# Patient Record
Sex: MALE | Race: BLACK OR AFRICAN AMERICAN | Employment: FULL TIME | URBAN - METROPOLITAN AREA
[De-identification: names, ages, dates, MRNs, and addresses within clinical notes are randomized per-mention and may not be internally consistent; named-entity substitution may affect disease eponyms.]

---

## 2021-06-02 DIAGNOSIS — Z11.3 ROUTINE SCREENING FOR STI (SEXUALLY TRANSMITTED INFECTION): ICD-10-CM

## 2021-06-02 DIAGNOSIS — Z21 HIV INFECTION, UNSPECIFIED SYMPTOM STATUS (H): Primary | ICD-10-CM

## 2021-06-02 NOTE — PROGRESS NOTES
Pt is newly diagnosed with HIV. Per [facility] Ambulatory Care Protocol, Pt is due for routine labs based on disease state or monitoring of medications. Lab orders entered per clinic protocol.  Amira Patricia RN

## 2021-06-10 DIAGNOSIS — Z11.3 ROUTINE SCREENING FOR STI (SEXUALLY TRANSMITTED INFECTION): ICD-10-CM

## 2021-06-10 DIAGNOSIS — Z21 HIV INFECTION, UNSPECIFIED SYMPTOM STATUS (H): ICD-10-CM

## 2021-06-10 LAB
ALBUMIN SERPL-MCNC: 3.8 G/DL (ref 3.4–5)
ALBUMIN UR-MCNC: NEGATIVE MG/DL
ALP SERPL-CCNC: 93 U/L (ref 40–150)
ALT SERPL W P-5'-P-CCNC: 38 U/L (ref 0–70)
AMYLASE SERPL-CCNC: 42 U/L (ref 30–110)
ANION GAP SERPL CALCULATED.3IONS-SCNC: 6 MMOL/L (ref 3–14)
APPEARANCE UR: CLEAR
AST SERPL W P-5'-P-CCNC: 27 U/L (ref 0–45)
BASOPHILS # BLD AUTO: 0 10E9/L (ref 0–0.2)
BASOPHILS NFR BLD AUTO: 1 %
BILIRUB SERPL-MCNC: 0.4 MG/DL (ref 0.2–1.3)
BILIRUB UR QL STRIP: NEGATIVE
BUN SERPL-MCNC: 7 MG/DL (ref 7–30)
CALCIUM SERPL-MCNC: 9.1 MG/DL (ref 8.5–10.1)
CD3 CELLS # BLD: 2497 CELLS/UL (ref 603–2990)
CD3 CELLS NFR BLD: 81 % (ref 49–84)
CD3+CD4+ CELLS # BLD: 606 CELLS/UL (ref 441–2156)
CD3+CD4+ CELLS NFR BLD: 20 % (ref 28–63)
CD3+CD4+ CELLS/CD3+CD8+ CLL BLD: 0.34 % (ref 1.4–2.6)
CD3+CD8+ CELLS # BLD: 1830 CELLS/UL (ref 125–1312)
CD3+CD8+ CELLS NFR BLD: 59 % (ref 10–40)
CHLORIDE SERPL-SCNC: 112 MMOL/L (ref 94–109)
CO2 SERPL-SCNC: 25 MMOL/L (ref 20–32)
COLOR UR AUTO: YELLOW
CREAT SERPL-MCNC: 0.9 MG/DL (ref 0.66–1.25)
DIFFERENTIAL METHOD BLD: ABNORMAL
EOSINOPHIL # BLD AUTO: 0 10E9/L (ref 0–0.7)
EOSINOPHIL NFR BLD AUTO: 1 %
ERYTHROCYTE [DISTWIDTH] IN BLOOD BY AUTOMATED COUNT: 11.5 % (ref 10–15)
GFR SERPL CREATININE-BSD FRML MDRD: >90 ML/MIN/{1.73_M2}
GLUCOSE SERPL-MCNC: 93 MG/DL (ref 70–99)
GLUCOSE UR STRIP-MCNC: NEGATIVE MG/DL
HCT VFR BLD AUTO: 43.3 % (ref 40–53)
HGB BLD-MCNC: 14.7 G/DL (ref 13.3–17.7)
HGB UR QL STRIP: NEGATIVE
IFC SPECIMEN: ABNORMAL
KETONES UR STRIP-MCNC: NEGATIVE MG/DL
LEUKOCYTE ESTERASE UR QL STRIP: NEGATIVE
LIPASE SERPL-CCNC: 42 U/L (ref 73–393)
LYMPHOCYTES # BLD AUTO: 3.4 10E9/L (ref 0.8–5.3)
LYMPHOCYTES NFR BLD AUTO: 76 %
MCH RBC QN AUTO: 30.1 PG (ref 26.5–33)
MCHC RBC AUTO-ENTMCNC: 33.9 G/DL (ref 31.5–36.5)
MCV RBC AUTO: 89 FL (ref 78–100)
MONOCYTES # BLD AUTO: 0.3 10E9/L (ref 0–1.3)
MONOCYTES NFR BLD AUTO: 6 %
NEUTROPHILS # BLD AUTO: 0.7 10E9/L (ref 1.6–8.3)
NEUTROPHILS NFR BLD AUTO: 16 %
NITRATE UR QL: NEGATIVE
PH UR STRIP: 5 PH (ref 5–7)
PLATELET # BLD AUTO: 304 10E9/L (ref 150–450)
PLATELET # BLD EST: ABNORMAL 10*3/UL
POTASSIUM SERPL-SCNC: 4 MMOL/L (ref 3.4–5.3)
PROT SERPL-MCNC: 8 G/DL (ref 6.8–8.8)
RBC # BLD AUTO: 4.88 10E12/L (ref 4.4–5.9)
RBC #/AREA URNS AUTO: 0 /HPF (ref 0–2)
RBC MORPH BLD: NORMAL
SODIUM SERPL-SCNC: 143 MMOL/L (ref 133–144)
SOURCE: NORMAL
SP GR UR STRIP: 1.01 (ref 1–1.03)
T PALLIDUM AB SER QL: REACTIVE
UROBILINOGEN UR STRIP-MCNC: 2 MG/DL (ref 0–2)
WBC # BLD AUTO: 4.5 10E9/L (ref 4–11)
WBC #/AREA URNS AUTO: 1 /HPF (ref 0–5)

## 2021-06-10 PROCEDURE — 36415 COLL VENOUS BLD VENIPUNCTURE: CPT | Performed by: PATHOLOGY

## 2021-06-10 PROCEDURE — 86708 HEPATITIS A ANTIBODY: CPT | Performed by: INTERNAL MEDICINE

## 2021-06-10 PROCEDURE — 86701 HIV-1ANTIBODY: CPT | Mod: XU | Performed by: INTERNAL MEDICINE

## 2021-06-10 PROCEDURE — 86359 T CELLS TOTAL COUNT: CPT | Performed by: INTERNAL MEDICINE

## 2021-06-10 PROCEDURE — 86706 HEP B SURFACE ANTIBODY: CPT | Performed by: INTERNAL MEDICINE

## 2021-06-10 PROCEDURE — 81381 HLA I TYPING 1 ALLELE HR: CPT | Performed by: INTERNAL MEDICINE

## 2021-06-10 PROCEDURE — 87389 HIV-1 AG W/HIV-1&-2 AB AG IA: CPT | Performed by: INTERNAL MEDICINE

## 2021-06-10 PROCEDURE — 86780 TREPONEMA PALLIDUM: CPT | Performed by: INTERNAL MEDICINE

## 2021-06-10 PROCEDURE — 86360 T CELL ABSOLUTE COUNT/RATIO: CPT | Performed by: INTERNAL MEDICINE

## 2021-06-10 PROCEDURE — 81001 URINALYSIS AUTO W/SCOPE: CPT | Performed by: PATHOLOGY

## 2021-06-10 PROCEDURE — 86777 TOXOPLASMA ANTIBODY: CPT | Performed by: INTERNAL MEDICINE

## 2021-06-10 PROCEDURE — 83690 ASSAY OF LIPASE: CPT | Performed by: PATHOLOGY

## 2021-06-10 PROCEDURE — 86644 CMV ANTIBODY: CPT | Performed by: INTERNAL MEDICINE

## 2021-06-10 PROCEDURE — 87536 HIV-1 QUANT&REVRSE TRNSCRPJ: CPT | Mod: XU | Performed by: INTERNAL MEDICINE

## 2021-06-10 PROCEDURE — 87340 HEPATITIS B SURFACE AG IA: CPT | Performed by: INTERNAL MEDICINE

## 2021-06-10 PROCEDURE — 86592 SYPHILIS TEST NON-TREP QUAL: CPT | Performed by: INTERNAL MEDICINE

## 2021-06-10 PROCEDURE — 80053 COMPREHEN METABOLIC PANEL: CPT | Performed by: PATHOLOGY

## 2021-06-10 PROCEDURE — 86702 HIV-2 ANTIBODY: CPT | Performed by: INTERNAL MEDICINE

## 2021-06-10 PROCEDURE — 87491 CHLMYD TRACH DNA AMP PROBE: CPT | Mod: XU | Performed by: INTERNAL MEDICINE

## 2021-06-10 PROCEDURE — 87591 N.GONORRHOEAE DNA AMP PROB: CPT | Mod: XU | Performed by: INTERNAL MEDICINE

## 2021-06-10 PROCEDURE — 82150 ASSAY OF AMYLASE: CPT | Performed by: PATHOLOGY

## 2021-06-10 PROCEDURE — 86704 HEP B CORE ANTIBODY TOTAL: CPT | Performed by: INTERNAL MEDICINE

## 2021-06-10 PROCEDURE — 86593 SYPHILIS TEST NON-TREP QUANT: CPT | Performed by: INTERNAL MEDICINE

## 2021-06-10 PROCEDURE — 85025 COMPLETE CBC W/AUTO DIFF WBC: CPT | Performed by: PATHOLOGY

## 2021-06-10 PROCEDURE — 86803 HEPATITIS C AB TEST: CPT | Performed by: INTERNAL MEDICINE

## 2021-06-10 NOTE — TELEPHONE ENCOUNTER
RECORDS RECEIVED FROM:    DATE RECEIVED: 06.17.2021   NOTES (Gather within 2 years) STATUS DETAILS   OFFICE NOTE from referring provider   N/A    OFFICE NOTE from other specialist N/a    DISCHARGE SUMMARY from hospital N/A    DISCHARGE REPORT from the ER N/A    LABS (any labs) Internal    MEDICATION LIST N/A    IMAGING  (NEED IMAGES AND REPORTS)     Osteomyelitis: Foot imaging  N/A    Liver Abscess: Abdominal imaging N/A    Other (anything related to diagnoses N/A

## 2021-06-11 LAB
C TRACH DNA SPEC QL NAA+PROBE: NEGATIVE
CMV IGG SERPL QL IA: >8 AI (ref 0–0.8)
HAV IGG SER QL IA: NONREACTIVE
HBV CORE AB SERPL QL IA: NONREACTIVE
HBV SURFACE AB SERPL IA-ACNC: 3.2 M[IU]/ML
HBV SURFACE AG SERPL QL IA: NONREACTIVE
HCV AB SERPL QL IA: NONREACTIVE
HIV 1 & 2 AB SERPL IA.RAPID: NONREACTIVE
HIV 1 & 2 AB SERPL IA.RAPID: REACTIVE
HIV 1+2 AB+HIV1 P24 AG SERPL QL IA: REACTIVE
HIV 1+2 AB+HIV1P24 AG SERPLBLD IA.RAPID: ABNORMAL
HIV1 RNA # PLAS NAA DL=20: 6123 {COPIES}/ML
HIV1 RNA SERPL NAA+PROBE-LOG#: 3.8 {LOG_COPIES}/ML
MISCELLANEOUS TEST: NORMAL
N GONORRHOEA DNA SPEC QL NAA+PROBE: NEGATIVE
RPR SER QL: REACTIVE
RPR SER-TITR: 32 {TITER}
SPECIMEN SOURCE: NORMAL
SPECIMEN SOURCE: NORMAL
T GONDII IGG SER QL: <3 IU/ML (ref 0–7.1)

## 2021-06-15 LAB
HLA TYPE SA METHOD: NORMAL
HLA TYPE SA RESULT: NORMAL

## 2021-06-17 ENCOUNTER — PRE VISIT (OUTPATIENT)
Dept: INFECTIOUS DISEASES | Facility: CLINIC | Age: 21
End: 2021-06-17

## 2021-06-17 ENCOUNTER — ALLIED HEALTH/NURSE VISIT (OUTPATIENT)
Dept: INFECTIOUS DISEASES | Facility: CLINIC | Age: 21
End: 2021-06-17
Attending: INTERNAL MEDICINE
Payer: MEDICAID

## 2021-06-17 DIAGNOSIS — A53.9 SYPHILIS: Primary | ICD-10-CM

## 2021-06-17 PROCEDURE — 250N000011 HC RX IP 250 OP 636: Performed by: INTERNAL MEDICINE

## 2021-06-17 PROCEDURE — 96372 THER/PROPH/DIAG INJ SC/IM: CPT | Performed by: INTERNAL MEDICINE

## 2021-06-17 RX ADMIN — PENICILLIN G BENZATHINE 2.4 MILLION UNITS: 1200000 INJECTION, SUSPENSION INTRAMUSCULAR at 13:45

## 2021-07-20 LAB — LAB SCANNED RESULT: NORMAL

## 2021-08-05 ENCOUNTER — OFFICE VISIT (OUTPATIENT)
Dept: INFECTIOUS DISEASES | Facility: CLINIC | Age: 21
End: 2021-08-05
Attending: INTERNAL MEDICINE
Payer: COMMERCIAL

## 2021-08-05 ENCOUNTER — OFFICE VISIT (OUTPATIENT)
Dept: PHARMACY | Facility: CLINIC | Age: 21
End: 2021-08-05
Payer: COMMERCIAL

## 2021-08-05 VITALS
DIASTOLIC BLOOD PRESSURE: 75 MMHG | BODY MASS INDEX: 40.05 KG/M2 | TEMPERATURE: 98.7 F | OXYGEN SATURATION: 97 % | HEART RATE: 109 BPM | SYSTOLIC BLOOD PRESSURE: 144 MMHG | WEIGHT: 233.3 LBS

## 2021-08-05 DIAGNOSIS — Z21 ASYMPTOMATIC HUMAN IMMUNODEFICIENCY VIRUS (HIV) INFECTION STATUS (H): Primary | ICD-10-CM

## 2021-08-05 DIAGNOSIS — A53.9 SYPHILIS: ICD-10-CM

## 2021-08-05 DIAGNOSIS — E66.01 MORBID OBESITY (H): ICD-10-CM

## 2021-08-05 PROCEDURE — 99207 PR NO CHARGE LOS: CPT | Performed by: PHARMACIST

## 2021-08-05 PROCEDURE — 96372 THER/PROPH/DIAG INJ SC/IM: CPT | Performed by: INTERNAL MEDICINE

## 2021-08-05 PROCEDURE — 250N000011 HC RX IP 250 OP 636: Performed by: INTERNAL MEDICINE

## 2021-08-05 PROCEDURE — G0463 HOSPITAL OUTPT CLINIC VISIT: HCPCS | Mod: 25

## 2021-08-05 PROCEDURE — 99214 OFFICE O/P EST MOD 30 MIN: CPT | Performed by: INTERNAL MEDICINE

## 2021-08-05 RX ADMIN — PENICILLIN G BENZATHINE 2.4 MILLION UNITS: 1200000 INJECTION, SUSPENSION INTRAMUSCULAR at 13:13

## 2021-08-05 NOTE — LETTER
8/5/2021       RE: Aidan Henning  [address]  [place] [address]     Dear Colleague,    Thank you for referring your patient, Aidan Henning, to the [facility] INFECTIOUS DISEASE CLINIC [place] at [facility]. Please see a copy of my visit note below.    [facility]  Infectious Disease Clinic Note     Date of Visit:  August 5, 2021  Patient:  Aidan Henning  Medical record number [id number]    History of Present Illness  Aidan Henning is a 21 year old male who is here to establish HIV caare. He states he gets tested every 3 months. His last negative was in Jan 2021 and his first positive was in March 2021. He also has a h/o recurrent syphilis. He reports he has been treated many times in the past. Last treated sometime in the early winter. He has no pas relevant medical history. No known allergies. No relevant travel history  He works for [employer] and lives on his own.    Problem List  1. Asymptomatic human immunodeficiency virus (HIV) infection status (H)    2. Morbid obesity (H)    3. Syphilis         Review of Systems  Twelve point review of systems is otherwise normal.    Current Medications  Current Outpatient Medications   Medication     bictegravir-emtricitabine-tenofovir (BIKTARVY) -25 MG per tablet     Current Facility-Administered Medications   Medication     penicillin G benzathine (BICILLIN L-A) injection 2.4 Million Units       Family/Social History  No family history on file.    Physical Exam  HEENT: Normal  Neck: Supple  Lungs: CTA  CV: RRR, no gallops, murmurs  Abdomen: Soft and nontender.  No masses noted.  :NL  Extremities: Normal  Skin: Normal  Neuro: Grossly normal  Lymphadenopathy:  Cervical 0     Axillary:1+     Inguinal:2+    Recent Laboratory Values    Routine Labs  Hemoglobin   Date Value Ref Range Status   06/10/2021 14.7 13.3 - 17.7 g/dL Final     MCV   Date Value Ref Range  Status   06/10/2021 89 78 - 100 fl Final     Platelet Count   Date Value Ref Range Status   06/10/2021 304 150 - 450 10e9/L Final     Creatinine   Date Value Ref Range Status   06/10/2021 0.90 0.66 - 1.25 mg/dL Final     AST   Date Value Ref Range Status   06/10/2021 27 0 - 45 U/L Final     ALT   Date Value Ref Range Status   06/10/2021 38 0 - 70 U/L Final     Bilirubin Total   Date Value Ref Range Status   06/10/2021 0.4 0.2 - 1.3 mg/dL Final       T Cell Subset:  CD3 Mature T   Date Value Ref Range Status   06/10/2021 81 49 - 84 % Final     CD4 [place] T   Date Value Ref Range Status   06/10/2021 20 (L) 28 - 63 % Final     CD8 Suppressor T   Date Value Ref Range Status   06/10/2021 59 (H) 10 - 40 % Final     CD4:CD8 Ratio   Date Value Ref Range Status   06/10/2021 0.34 (L) 1.40 - 2.60 Final     WBC   Date Value Ref Range Status   06/10/2021 4.5 4.0 - 11.0 10e9/L Final     % Lymphocytes   Date Value Ref Range Status   06/10/2021 76.0 % Final     Absolute CD3   Date Value Ref Range Status   06/10/2021 2,497 603 - 2,990 cells/uL Final     Absolute CD4   Date Value Ref Range Status   06/10/2021 606 441 - 2,156 cells/uL Final     Absolute CD8   Date Value Ref Range Status   06/10/2021 1,830 (H) 125 - 1,312 cells/uL Final       HIV-1 RNA Quantitative:  HIV-1 RNA Quant Result   Date Value Ref Range Status   06/10/2021 6,123 (A) HIVND^HIV-1 RNA Not Detected [Copies]/mL Final     Comment:     The RYAN AmpliPrep/RYAN TaqMan HIV-1 test is an FDA-approved in vitro   nucleic acid amplification test for the quantitation of HIV-1 RNA in human   plasma (EDTA plasma) using the RYAN AmpliPrep instrument for automated viral   nucleic acid extraction and the RYAN TaqMan Analyzer or RYAN TaqMan for   automated Real Time PCR amplification and detection of the viral nucleic acid   target.  Titer results are reported in copies/ml. This assay is intended for use in   conjunction with clinical presentation and other laboratory markers  of disease   prognosis and for use as an aid in assessing viral response to antiretroviral   treatment as measured by changes in plasma HIV-1 RNA levels. This test should   not be used as a donor screening test to confirm the presence of HIV-1   infection.          Assessment and Plan  (Z21) Asymptomatic human immunodeficiency virus (HIV) infection status (H)  (primary encounter diagnosis)  Comment: New diagnosis of HIV. He has been infefected for 3-6 months. He has a CD4 of >600 and pVL~6000 copies/ml. This is evidence for reasonably good control on his own without ART. He should respond well to therapy.  Plan: bictegravir-emtricitabine-tenofovir (BIKTARVY)         -25 MG per tablet, Comprehensive         metabolic panel, CBC with platelets         differential, HIV-1 RNA quantitative, T cell         subset profile      (E66.01) Morbid obesity (H)      (A53.9) Syphilis  Comment: His h/o treatment is not clear. Regardless he has a pretty high titer now and will treat and monitor RPR  Plan: penicillin G benzathine (BICILLIN L-A)         injection 2.4 Million Units x 3        Again, thank you for allowing me to participate in the care of your patient.      Sincerely,    Andrew Flores MD

## 2021-08-05 NOTE — LETTER
Date:September 18, 2021      Patient was self referred, no letter generated. Do not send.        [facility] Health Information

## 2021-08-05 NOTE — PROGRESS NOTES
[facility]  Infectious Disease Clinic Note     Date of Visit:  August 5, 2021  Patient:  Aidan Henning  Medical record number [id number]    History of Present Illness  Aidan Henning is a 21 year old male who is here to establish HIV caare. He states he gets tested every 3 months. His last negative was in Jan 2021 and his first positive was in March 2021. He also has a h/o recurrent syphilis. He reports he has been treated many times in the past. Last treated sometime in the early winter. He has no pas relevant medical history. No known allergies. No relevant travel history  He works for [employer] and lives on his own.    Problem List  1. Asymptomatic human immunodeficiency virus (HIV) infection status (H)    2. Morbid obesity (H)    3. Syphilis         Review of Systems  Twelve point review of systems is otherwise normal.    Current Medications  Current Outpatient Medications   Medication     bictegravir-emtricitabine-tenofovir (BIKTARVY) -25 MG per tablet     Current Facility-Administered Medications   Medication     penicillin G benzathine (BICILLIN L-A) injection 2.4 Million Units       Family/Social History  No family history on file.    Physical Exam  HEENT: Normal  Neck: Supple  Lungs: CTA  CV: RRR, no gallops, murmurs  Abdomen: Soft and nontender.  No masses noted.  :NL  Extremities: Normal  Skin: Normal  Neuro: Grossly normal  Lymphadenopathy:  Cervical 0     Axillary:1+     Inguinal:2+    Recent Laboratory Values    Routine Labs  Hemoglobin   Date Value Ref Range Status   06/10/2021 14.7 13.3 - 17.7 g/dL Final     MCV   Date Value Ref Range Status   06/10/2021 89 78 - 100 fl Final     Platelet Count   Date Value Ref Range Status   06/10/2021 304 150 - 450 10e9/L Final     Creatinine   Date Value Ref Range Status   06/10/2021 0.90 0.66 - 1.25 mg/dL Final     AST   Date Value Ref Range Status   06/10/2021 27 0 - 45 U/L Final     ALT   Date Value Ref Range Status    06/10/2021 38 0 - 70 U/L Final     Bilirubin Total   Date Value Ref Range Status   06/10/2021 0.4 0.2 - 1.3 mg/dL Final       T Cell Subset:  CD3 Mature T   Date Value Ref Range Status   06/10/2021 81 49 - 84 % Final     CD4 [place] T   Date Value Ref Range Status   06/10/2021 20 (L) 28 - 63 % Final     CD8 Suppressor T   Date Value Ref Range Status   06/10/2021 59 (H) 10 - 40 % Final     CD4:CD8 Ratio   Date Value Ref Range Status   06/10/2021 0.34 (L) 1.40 - 2.60 Final     WBC   Date Value Ref Range Status   06/10/2021 4.5 4.0 - 11.0 10e9/L Final     % Lymphocytes   Date Value Ref Range Status   06/10/2021 76.0 % Final     Absolute CD3   Date Value Ref Range Status   06/10/2021 2,497 603 - 2,990 cells/uL Final     Absolute CD4   Date Value Ref Range Status   06/10/2021 606 441 - 2,156 cells/uL Final     Absolute CD8   Date Value Ref Range Status   06/10/2021 1,830 (H) 125 - 1,312 cells/uL Final       HIV-1 RNA Quantitative:  HIV-1 RNA Quant Result   Date Value Ref Range Status   06/10/2021 6,123 (A) HIVND^HIV-1 RNA Not Detected [Copies]/mL Final     Comment:     The RYAN AmpliPrep/RYAN TaqMan HIV-1 test is an FDA-approved in vitro   nucleic acid amplification test for the quantitation of HIV-1 RNA in human   plasma (EDTA plasma) using the RYAN AmpliPrep instrument for automated viral   nucleic acid extraction and the RYAN TaqMan Analyzer or RYAN TaqMan for   automated Real Time PCR amplification and detection of the viral nucleic acid   target.  Titer results are reported in copies/ml. This assay is intended for use in   conjunction with clinical presentation and other laboratory markers of disease   prognosis and for use as an aid in assessing viral response to antiretroviral   treatment as measured by changes in plasma HIV-1 RNA levels. This test should   not be used as a donor screening test to confirm the presence of HIV-1   infection.          Assessment and Plan  (Z21) Asymptomatic human  immunodeficiency virus (HIV) infection status (H)  (primary encounter diagnosis)  Comment: New diagnosis of HIV. He has been infefected for 3-6 months. He has a CD4 of >600 and pVL~6000 copies/ml. This is evidence for reasonably good control on his own without ART. He should respond well to therapy.  Plan: bictegravir-emtricitabine-tenofovir (BIKTARVY)         -25 MG per tablet, Comprehensive         metabolic panel, CBC with platelets         differential, HIV-1 RNA quantitative, T cell         subset profile      (E66.01) Morbid obesity (H)      (A53.9) Syphilis  Comment: His h/o treatment is not clear. Regardless he has a pretty high titer now and will treat and monitor RPR  Plan: penicillin G benzathine (BICILLIN L-A)         injection 2.4 Million Units x 3

## 2021-08-05 NOTE — NURSING NOTE
Chief Complaint   Patient presents with     New Patient     per Yang         BP (!) 144/75 (BP Location: Right arm, Patient Position: Sitting, Cuff Size: Adult Regular)   Pulse 109   Temp 98.7  F (37.1  C)   Wt 105.8 kg (233 lb 4.8 oz)   SpO2 97%   BMI 40.05 kg/m        Kemar Paul, EMT

## 2021-08-05 NOTE — NURSING NOTE
Pt already received 1 Bicillin dose on 6/17/21, however more than 7 days have passed so now pt needs 3 more doses of Bicillin for titer of 32 dated 6/10/21 with no prior syphilis testing done that we know of.  Ceci Hinton RN

## 2021-08-05 NOTE — LETTER
Date:September 18, 2021      Patient was self referred, no letter generated. Do not send.        [facility] Health Information

## 2021-08-05 NOTE — LETTER
8/5/2021      RE: Aidan Henning  [address]  [place] [address]       [facility]  Infectious Disease Clinic Note     Date of Visit:  August 5, 2021  Patient:  Aidan Henning  Medical record number [id number]    History of Present Illness  Aidan Henning is a 21 year old male who is here to establish HIV caare. He states he gets tested every 3 months. His last negative was in Jan 2021 and his first positive was in March 2021. He also has a h/o recurrent syphilis. He reports he has been treated many times in the past. Last treated sometime in the early winter. He has no pas relevant medical history. No known allergies. No relevant travel history  He works for [employer] and lives on his own.    Problem List  1. Asymptomatic human immunodeficiency virus (HIV) infection status (H)    2. Morbid obesity (H)    3. Syphilis         Review of Systems  Twelve point review of systems is otherwise normal.    Current Medications  Current Outpatient Medications   Medication     bictegravir-emtricitabine-tenofovir (BIKTARVY) -25 MG per tablet     Current Facility-Administered Medications   Medication     penicillin G benzathine (BICILLIN L-A) injection 2.4 Million Units       Family/Social History  No family history on file.    Physical Exam  HEENT: Normal  Neck: Supple  Lungs: CTA  CV: RRR, no gallops, murmurs  Abdomen: Soft and nontender.  No masses noted.  :NL  Extremities: Normal  Skin: Normal  Neuro: Grossly normal  Lymphadenopathy:  Cervical 0     Axillary:1+     Inguinal:2+    Recent Laboratory Values    Routine Labs  Hemoglobin   Date Value Ref Range Status   06/10/2021 14.7 13.3 - 17.7 g/dL Final     MCV   Date Value Ref Range Status   06/10/2021 89 78 - 100 fl Final     Platelet Count   Date Value Ref Range Status   06/10/2021 304 150 - 450 10e9/L Final     Creatinine   Date Value Ref Range Status   06/10/2021 0.90 0.66 - 1.25 mg/dL Final     AST   Date Value Ref  Range Status   06/10/2021 27 0 - 45 U/L Final     ALT   Date Value Ref Range Status   06/10/2021 38 0 - 70 U/L Final     Bilirubin Total   Date Value Ref Range Status   06/10/2021 0.4 0.2 - 1.3 mg/dL Final       T Cell Subset:  CD3 Mature T   Date Value Ref Range Status   06/10/2021 81 49 - 84 % Final     CD4 [place] T   Date Value Ref Range Status   06/10/2021 20 (L) 28 - 63 % Final     CD8 Suppressor T   Date Value Ref Range Status   06/10/2021 59 (H) 10 - 40 % Final     CD4:CD8 Ratio   Date Value Ref Range Status   06/10/2021 0.34 (L) 1.40 - 2.60 Final     WBC   Date Value Ref Range Status   06/10/2021 4.5 4.0 - 11.0 10e9/L Final     % Lymphocytes   Date Value Ref Range Status   06/10/2021 76.0 % Final     Absolute CD3   Date Value Ref Range Status   06/10/2021 2,497 603 - 2,990 cells/uL Final     Absolute CD4   Date Value Ref Range Status   06/10/2021 606 441 - 2,156 cells/uL Final     Absolute CD8   Date Value Ref Range Status   06/10/2021 1,830 (H) 125 - 1,312 cells/uL Final       HIV-1 RNA Quantitative:  HIV-1 RNA Quant Result   Date Value Ref Range Status   06/10/2021 6,123 (A) HIVND^HIV-1 RNA Not Detected [Copies]/mL Final     Comment:     The RYAN AmpliPrep/RYAN TaqMan HIV-1 test is an FDA-approved in vitro   nucleic acid amplification test for the quantitation of HIV-1 RNA in human   plasma (EDTA plasma) using the RYAN AmpliPrep instrument for automated viral   nucleic acid extraction and the RYAN TaqMan Analyzer or RYAN TaqMan for   automated Real Time PCR amplification and detection of the viral nucleic acid   target.  Titer results are reported in copies/ml. This assay is intended for use in   conjunction with clinical presentation and other laboratory markers of disease   prognosis and for use as an aid in assessing viral response to antiretroviral   treatment as measured by changes in plasma HIV-1 RNA levels. This test should   not be used as a donor screening test to confirm the presence of  HIV-1   infection.          Assessment and Plan  (Z21) Asymptomatic human immunodeficiency virus (HIV) infection status (H)  (primary encounter diagnosis)  Comment: New diagnosis of HIV. He has been infefected for 3-6 months. He has a CD4 of >600 and pVL~6000 copies/ml. This is evidence for reasonably good control on his own without ART. He should respond well to therapy.  Plan: bictegravir-emtricitabine-tenofovir (BIKTARVY)         -25 MG per tablet, Comprehensive         metabolic panel, CBC with platelets         differential, HIV-1 RNA quantitative, T cell         subset profile      (E66.01) Morbid obesity (H)      (A53.9) Syphilis  Comment: His h/o treatment is not clear. Regardless he has a pretty high titer now and will treat and monitor RPR  Plan: penicillin G benzathine (BICILLIN L-A)         injection 2.4 Million Units x 3        Andrew Flores MD

## 2021-08-06 NOTE — PROGRESS NOTES
Clinical Pharmacy Consult:                                                    Aidan Henning is a 21 year old male coming in for a clinical pharmacist consult.  He was referred to me from [facility] Clinic/Dr. lFores and was seen after initial visit with Dr. Flores.       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      HIV - dx 6/2021  Current medications: none    Past medications: none  Mutations (reviewed 08/06/21):   NNRTI: I178M  PI: E35D, L63T, A71V  HLA B 5701: negative    Plan:  1. We discussed possible side effects to his medication, the importance of medication adherence, what to do in the event of a missed dose, drug-drug interactions, and common lab values we will be monitoring and their meaning.     2. Yuki Bright CPhT will plan to mail out medications to pt monthly  3. Will plan on coming in next week when he has more time for covid-19 vaccination    Follow-up: 1-2 weeks with MTM phamacist by phone to assess medication tolerability and adherence.      Chapis Lara, PharmD, BCACP

## 2021-08-11 ENCOUNTER — VIRTUAL VISIT (OUTPATIENT)
Dept: PHARMACY | Facility: CLINIC | Age: 21
End: 2021-08-11
Payer: COMMERCIAL

## 2021-08-11 ENCOUNTER — ALLIED HEALTH/NURSE VISIT (OUTPATIENT)
Dept: INFECTIOUS DISEASES | Facility: CLINIC | Age: 21
End: 2021-08-11
Payer: COMMERCIAL

## 2021-08-11 DIAGNOSIS — Z21 ASYMPTOMATIC HUMAN IMMUNODEFICIENCY VIRUS (HIV) INFECTION STATUS (H): Primary | ICD-10-CM

## 2021-08-11 DIAGNOSIS — A53.9 SYPHILIS: Primary | ICD-10-CM

## 2021-08-11 DIAGNOSIS — Z72.0 TOBACCO ABUSE: ICD-10-CM

## 2021-08-11 PROCEDURE — 99605 MTMS BY PHARM NP 15 MIN: CPT | Performed by: PHARMACIST

## 2021-08-11 PROCEDURE — 96372 THER/PROPH/DIAG INJ SC/IM: CPT | Performed by: INTERNAL MEDICINE

## 2021-08-11 PROCEDURE — 250N000011 HC RX IP 250 OP 636: Performed by: INTERNAL MEDICINE

## 2021-08-11 RX ADMIN — PENICILLIN G BENZATHINE 2.4 MILLION UNITS: 1200000 INJECTION, SUSPENSION INTRAMUSCULAR at 15:08

## 2021-08-11 NOTE — PROGRESS NOTES
Medication Therapy Management (MTM) Encounter    ASSESSMENT:                            Medication Adherence/Access: No issues identified    HIV: Newly diagnosed and recently started medications, on 6/10/2021 CD4 was 606 and viral load was 6,123    PLAN:                            - Encouraged ongoing adherence  - Labs in 2-3 months  - Continue to monitor blood pressure, most recent value elevated  - Continue to assess readiness to quit tobacco     Follow-up: 1 month - medication adherence    Chapis Lara, PharmD, BCACP    SUBJECTIVE/OBJECTIVE:                          Aidan Henning is a 21 year old male called for an initial visit. He was referred to me from [facility] Clinic/Dr. Flores.      Allergies/ADRs: None  Past Medical History: Reviewed in chart  Tobacco: He reports that he has been smoking. He has never used smokeless tobacco.Tobacco Cessation Action Plan:   Information offered: Patient not interested at this time  Alcohol: 4-6 beverages / week    Reason for visit: New Med Start check-in. He started taking Biktarvy about 1 week ago and is tolerating well with no missed doses.  He denies stomach upset, nausea, vomiting, constipation, diarrhea.  He plans to come in today for second bicillin injection and covid-19 shot.      Medication Adherence/Access:   Patient takes medications directly from bottles.  Patient takes medications 1 time(s) per day.   Per patient, misses medication 1 times per week.   Medication barriers: none.   The patient fills medications at [facility]: YES.    HIV  Current medications:   Biktarvy - 1 tablet once daily - AM    Past medications: None    Mutations (reviewed 8/11/2021):   NNRTI: I178M  PI: E35D, L63T, A71V    HLA B 5701: negative    Today's Vitals: There were no vitals taken for this visit.     BP Readings from Last 3 Encounters:   08/05/21 (!) 144/75      No results for input(s): CHOL, HDL, LDL, TRIG, CHOLHDLRATIO in the last 71730 hours.  No results found for: A1C   Sodium   Date  Value Ref Range Status   06/10/2021 143 133 - 144 mmol/L Final     Potassium   Date Value Ref Range Status   06/10/2021 4.0 3.4 - 5.3 mmol/L Final     Chloride   Date Value Ref Range Status   06/10/2021 112 (H) 94 - 109 mmol/L Final     Carbon Dioxide   Date Value Ref Range Status   06/10/2021 25 20 - 32 mmol/L Final     Anion Gap   Date Value Ref Range Status   06/10/2021 6 3 - 14 mmol/L Final     Glucose   Date Value Ref Range Status   06/10/2021 93 70 - 99 mg/dL Final     Urea Nitrogen   Date Value Ref Range Status   06/10/2021 7 7 - 30 mg/dL Final     Creatinine   Date Value Ref Range Status   06/10/2021 0.90 0.66 - 1.25 mg/dL Final     GFR Estimate   Date Value Ref Range Status   06/10/2021 >90 >60 mL/min/[1.73_m2] Final     Comment:     Non  GFR Calc  Starting 12/18/2018, serum creatinine based estimated GFR (eGFR) will be   calculated using the Chronic Kidney Disease Epidemiology Collaboration   (CKD-EPI) equation.       Calcium   Date Value Ref Range Status   06/10/2021 9.1 8.5 - 10.1 mg/dL Final       Component      Latest Ref Rng & Units 6/10/2021   HIV RNA QT Log      <1.3 Log:copies/mL 3.8 (H)     Lab Results   Component Value Date    CR 0.90 06/10/2021      Recent Labs   Lab Test 06/10/21  1255   WBC 4.5   RBC 4.88   HGB 14.7   HCT 43.3   MCV 89   MCH 30.1   MCHC 33.9   RDW 11.5         Recent Labs   Lab Test 06/10/21  1255   PROTTOTAL 8.0   ALBUMIN 3.8   BILITOTAL 0.4   ALKPHOS 93   AST 27   ALT 38      ----------------    I spent 5 minutes with this patient today. All changes were made via collaborative practice agreement with Dr. Flores. A copy of the visit note was provided to the patient's referring provider.  The patient was sent via [employer] a summary of these recommendations.       Telemedicine Visit Details  Type of service:  Telephone visit  Start Time: 11 AM  End Time: 11:05 AM  Originating Location (patient location): [place]  Distant Location (provider location):    [facility] AND INFECTIOUS DISEASES MTM     Medication Therapy Recommendations  Tobacco abuse    Rationale: Untreated condition - Needs additional medication therapy - Indication   Recommendation: Continue to Monitor - Consider NRT or buproprion for tobacco cessation   Status: Accepted per CPA

## 2021-08-11 NOTE — Clinical Note
FYI only - will continue to follow for adherence and assess readiness to quit and blood pressure.    Chapis

## 2021-08-16 NOTE — PATIENT INSTRUCTIONS
Recommendations from today's MTM visit:                                                      1) Keep up the excellent job taking your medication every day    Next MTM visit: I will call you again on 8/14/2021 at 9 AM to check-in and see how you are doing    To schedule another MTM appointment, please call the clinic directly or you may call the MTM scheduling line at [phone] or toll-free at [phone].     I value your experience and would be very thankful for your time with providing feedback on our clinic survey. You may receive a survey via email or text message in the next few days.     Please feel free to contact me with any questions or concerns you have.      Take care!  Chapis Lara, PharmD, BCACP

## 2021-09-14 ENCOUNTER — VIRTUAL VISIT (OUTPATIENT)
Dept: PHARMACY | Facility: CLINIC | Age: 21
End: 2021-09-14
Payer: COMMERCIAL

## 2021-09-14 DIAGNOSIS — Z72.0 TOBACCO ABUSE: ICD-10-CM

## 2021-09-14 DIAGNOSIS — Z21 ASYMPTOMATIC HUMAN IMMUNODEFICIENCY VIRUS (HIV) INFECTION STATUS (H): Primary | ICD-10-CM

## 2021-09-14 PROCEDURE — 99607 MTMS BY PHARM ADDL 15 MIN: CPT | Performed by: PHARMACIST

## 2021-09-14 PROCEDURE — 99606 MTMS BY PHARM EST 15 MIN: CPT | Performed by: PHARMACIST

## 2021-09-14 NOTE — PATIENT INSTRUCTIONS
Recommendations from today's MTM visit:                                                      1) Keep up the EXCELLENT work taking your medication consistently every day.    2) Come in for your covid-19 shot when you can    3) Work towards quitting cigarettes completely!  I've included some information below about options to help you quit, I'll also mail out some information to you.    4) I will ask our pharmacy to mail out your medications to you on a monthly basis     Next MTM visit: I will call you again on 10/12/2021 at 9 AM    To schedule another MTM appointment, please call the clinic directly or you may call the MTM scheduling line at [phone] or toll-free at [phone].     I value your experience and would be very thankful for your time with providing feedback on our clinic survey. You may receive a survey via email or text message in the next few days.     Please feel free to contact me with any questions or concerns you have.      Take care!  Chapis Lara, PharmD, BCACP     Treatments for nicotine withdrawal  Medicines and nicotine replacement aids can help reduce the cravings and effects of withdrawal. They give you more time to break your habit.     Medicines   bupropion (Zyban, Wellbutrin; prescribed)About this medicine: It helps with physical and mental addiction. Most people say it makes cigarettes taste bad. Treatment lasts from 7 to 12 weeks but can last up to 6 months.     When to start: Start at least 1 week before quit date.    How to take it: Days 1 to 3: Take one pill (150 mg) in the morning.    Day 4: Start taking one pill (150 mg) two times a day (total of 300 mg).       Possible side effects: Dry mouth, trouble sleeping and headaches. If you have trouble sleeping, take pills 8  hours apart.   When to get medical help: Call your doctor if you have any changes in mood or behavior.       varenicline (Chantix, prescribed)About this medicine: It can be used for 12 weeks. It can be  continued for another 12 weeks, if needed to help maintain success.       Precautions: Tell your doctor if you have any history of mental illness. Don t use this medicine if you  are a ,  or truck or .    When to start: Start at least 1 week before quit date.    How to take it: Take with food and full glass of water.Days 1 to 3: Take one pill (0.5 mg) once a day.   Days 4 to 7: Take one pill (0.5 mg) two times a day.   Continuing weeks: Take 1 mg two times a day for a total of 12 weeks.      Possible side effects: Nausea (feeling sick to your stomach), headache, strange dreams and constipation  (trouble having bowel movements). If you have trouble sleeping, take the pills 8 to 10 hours apart.   When to get medical help: Call 911 if you have signs of a heart attack (chest pain, abnormal sweating, feel  sick to your stomach or vomiting) or stroke (sudden muscle weakness, very bad headache, problems with  eyesight, speech, balance or thinking).  Call the doctor if you have changes in your mood, thoughts of violence  or suicide, or see things that aren t there.     Nicotine replacement therapy  nicotine nasal spray (Nicotrol NS, prescribed)   About this medicine: This medicine is used for 3 to 6 months. Taper off (use less and less) over the last 4 to  6 weeks of treatment.   How to take it: Blow nose gently. Then take 1 spray in each side of nose every hour. Two sprays contain 1  mg of nicotine. Do not take more than 10 sprays per hour or 40 sprays per 24 hours.       Possible side effects: Sore nose and throat, runny nose and sneezing, dizziness, headache, insomnia.     nicotine inhaler (Nicotrol Inhaler, prescribed)   About this medicine: The inhaler looks like a cigarette. Each inhaler has a cartridge that makes nicotine vapor  when you puff on it. One cartridge lasts for four 5-minute puffing sessions. Do not smoke cigarettes while  using this inhaler.    Precautions: Clean the  mouthpiece often with soap and water.    How to take it: Take 3 to 4 puffs a minute for 20 minutes. Or see what works for you. Try puffing as much as  you want for 5 or 20 minutes at a time.You will use 6 to 16 cartridges per day. (Be sure to use at least four  cartridges in a day.). Reduce your use after 3 months, and do not use longer than 6 months.       Possible side effects: Cough, sore mouth or throat     nicotine patch (Nicoderm CQ, Habitrol; over the counter)   Precautions: Do not smoke cigarettes while using a patch. Skin must be clean, dry and hairless.   How to take it: Every 24 hours, remove the old patch. Then, apply a new one to a new place, such as your  upper, outer arm or between neck and waist. Wash your hands after you apply a patch.     nicotine lozenge (Commit Lozenge, Nicorette; over the counter)    About this medicine: This lozenge comes in two strengths: 2 mg (for those who usually wait longer than 30  minutes to smoke a cigarette after first waking up) and 4 mg (for those who smoke their first cigarette within 30  minutes of waking.) Do not change strengths:   Precautions: Do not use more than 1 lozenge at a time.    Do not eat or drink while the lozenge is in your mouth.       How to take it: When the urge to smoke occurs, suck on 1 lozenge to release nicotine. Move the lozenge  from cheek to cheek. Try to swallow as little as you can. It may help to hold the lozenge in the side of your  mouth. It will melt in 20 to 30 minutes.    Follow this schedule:   Weeks 1 to 6: One lozenge every 1 to 2 hours. Use at least 9 lozenges a day, but no more than 20.    Weeks 7 to 9: One lozenge every 2 to 4 hours.   Weeks 10 to 12: One lozenge every 4 to 8 hours.      Possible side effects: Headache, mild nausea (feeling sick to your stomach), heartburn, hiccups, trouble  sleeping and sore mouth.

## 2021-09-14 NOTE — PROGRESS NOTES
"Medication Therapy Management (MTM) Encounter    ASSESSMENT:                            Medication Adherence/Access: See below for considerations    HIV: Newly diagnosed - has been taking Biktarvy consistently for the past 1+ months with no adverse effects.  Will update labs before next ID visit.    Tobacco Use: Pt continues to smoke and is interested in learning about options for quitting    Immunizations: Missed his second dose of covid-19 vaccine     PLAN:                            HIV  - Follow-up with Dr. Flores 11/4/21 with labs on 10/28/2021  - Will ask Yuki Bright CPhT to mail out medication to patient    Tobacco Use  - Provided pt with information regarding options for quitting    Addendum: Mailed out \"Quitting for Good with Treatment and Support\" booklet    Other  - Encouraged pt to come in for second covid-19 vaccine    Follow-up: 1 month - smoking cessation    Chapis Lara, PharmD, BCACP    SUBJECTIVE/OBJECTIVE:                          Aidan Henning is a 21 year old male called for a follow-up visit. He was referred to me from [facility] Clinic/Dr. Flores.  Today's visit is a follow-up MTM visit from 8/11/2021.       Allergies/ADRs: None  Past Medical History: Reviewed in chart  Tobacco: He reports that he has been smoking - smoked a pack in the past week   Tobacco Cessation Action Plan: has not purchased a pack in the past 3 days  Pharmacotherapies : unsure  Alcohol: 4-6 beverages / week  Occupation: currently unemployed, plans to start in sales at [employer]     Reason for visit: Medication check-in.    He is doing well - he has been taking his Biktarvy with no missed doses and is tolerating well.  He reports that his dog broke her leg last week so he was not able to come in for second dose of covid-19 vaccination (first dose of Moderna vaccine given on 8/11/2021).       Pt denies fever, sore throat, cough, stomach upset, nausea, vomiting, constipation, change in diarrhea (has been on " ongoing problem), trouble sleeping, unusual weight loss/gain, unusual muscle aches/cramping.      Medication Adherence/Access:   Patient takes medications directly from bottles.  Patient takes medications 1 time(s) per day.   Per patient, misses medication 0 times per week.   Medication barriers: none.   The patient fills medications at [place]: YES, is interested in having this mailed out going forward     HIV  Current medications:   Biktarvy - 1 tablet once daily - AM     Past medications: None     Mutations (reviewed 8/11/2021):   NNRTI: I178M  PI: E35D, L63T, A71V     HLA B 5701: negative    Tobacco Use  Smoking slightly less than 0.5 ppd  Is interested in quitting, currently trying not to buy cigarettes, although neighbors/family share with him    Past medications:  Nicotine gum - did not like this     Today's Vitals: There were no vitals taken for this visit.     BP Readings from Last 3 Encounters:   08/05/21 (!) 144/75      No results for input(s): CHOL, HDL, LDL, TRIG, CHOLHDLRATIO in the last 67565 hours.  No results found for: A1C   Sodium   Date Value Ref Range Status   06/10/2021 143 133 - 144 mmol/L Final     Potassium   Date Value Ref Range Status   06/10/2021 4.0 3.4 - 5.3 mmol/L Final     Chloride   Date Value Ref Range Status   06/10/2021 112 (H) 94 - 109 mmol/L Final     Carbon Dioxide   Date Value Ref Range Status   06/10/2021 25 20 - 32 mmol/L Final     Anion Gap   Date Value Ref Range Status   06/10/2021 6 3 - 14 mmol/L Final     Glucose   Date Value Ref Range Status   06/10/2021 93 70 - 99 mg/dL Final     Urea Nitrogen   Date Value Ref Range Status   06/10/2021 7 7 - 30 mg/dL Final     Creatinine   Date Value Ref Range Status   06/10/2021 0.90 0.66 - 1.25 mg/dL Final     GFR Estimate   Date Value Ref Range Status   06/10/2021 >90 >60 mL/min/[1.73_m2] Final     Comment:     Non  GFR Calc  Starting 12/18/2018, serum creatinine based estimated GFR (eGFR) will be   calculated using  the Chronic Kidney Disease Epidemiology Collaboration   (CKD-EPI) equation.       Calcium   Date Value Ref Range Status   06/10/2021 9.1 8.5 - 10.1 mg/dL Final       Lab Results   Component Value Date    HIV-1 RNA Quant Result 6,123 06/10/2021    HIV RNA QT Log 3.8 06/10/2021    HIV 2 Result Nonreactive 06/10/2021    HIV Interpretation See Comment Below 06/10/2021     Component      Latest Ref Rng & Units 6/10/2021   CD3 Mature T      49 - 84 % 81   CD4 [place] T      28 - 63 % 20 (L)   CD8 Suppressor T      10 - 40 % 59 (H)   CD4:CD8 Ratio      1.40 - 2.60 0.34 (L)   Absolute CD3      603 - 2,990 cells/uL 2,497   Absolute CD4      441 - 2,156 cells/uL 606   Absolute CD8      125 - 1,312 cells/uL 1,830 (H)     Lab Results   Component Value Date    CR 0.90 06/10/2021      Recent Labs   Lab Test 06/10/21  1255   WBC 4.5   RBC 4.88   HGB 14.7   HCT 43.3   MCV 89   MCH 30.1   MCHC 33.9   RDW 11.5         Recent Labs   Lab Test 06/10/21  1255   PROTTOTAL 8.0   ALBUMIN 3.8   BILITOTAL 0.4   ALKPHOS 93   AST 27   ALT 38        ----------------    I spent 17 minutes with this patient today. All changes were made via collaborative practice agreement with Dr. Flores. A copy of the visit note was provided to the patient's referring provider.  The patient was sent via [employer] a summary of these recommendations.       Telemedicine Visit Details  Type of service:  Telephone visit  Start Time: 9:05 AM  End Time: 9:22 AM  Originating Location (patient location): Home  Distant Location (provider location):  [facility] AND INFECTIOUS DISEASES MTM     Medication Therapy Recommendations  Tobacco abuse    Rationale: Untreated condition - Needs additional medication therapy - Indication   Recommendation: Continue to Monitor - Consider NRT or buproprion for tobacco cessation   Status: Accepted per CPA

## 2021-09-19 ENCOUNTER — HEALTH MAINTENANCE LETTER (OUTPATIENT)
Age: 21
End: 2021-09-19

## 2021-09-22 ENCOUNTER — TELEPHONE (OUTPATIENT)
Dept: PHARMACY | Facility: CLINIC | Age: 21
End: 2021-09-22

## 2021-09-22 NOTE — TELEPHONE ENCOUNTER
Called patient for refill reminder.    Will mail Biktarvy  prescriptions address has been verified.   30 day supply       1 month of on time refill.    Last Filled on:  09/01/21  Follow-up Date: 10/21/21    Yuki Bright CPhT  [facility]  [phone]

## 2021-10-12 ENCOUNTER — VIRTUAL VISIT (OUTPATIENT)
Dept: PHARMACY | Facility: CLINIC | Age: 21
End: 2021-10-12
Payer: COMMERCIAL

## 2021-10-12 DIAGNOSIS — Z21 ASYMPTOMATIC HUMAN IMMUNODEFICIENCY VIRUS (HIV) INFECTION STATUS (H): Primary | ICD-10-CM

## 2021-10-12 DIAGNOSIS — Z72.0 TOBACCO ABUSE: ICD-10-CM

## 2021-10-12 PROCEDURE — 99607 MTMS BY PHARM ADDL 15 MIN: CPT | Performed by: PHARMACIST

## 2021-10-12 PROCEDURE — 99606 MTMS BY PHARM EST 15 MIN: CPT | Performed by: PHARMACIST

## 2021-10-12 RX ORDER — POLYETHYLENE GLYCOL 3350 17 G
POWDER IN PACKET (EA) ORAL
Qty: 27 LOZENGE | Refills: 0 | Status: SHIPPED | OUTPATIENT
Start: 2021-10-12 | End: 2022-04-20

## 2021-10-12 NOTE — PROGRESS NOTES
Medication Therapy Management (MTM) Encounter    ASSESSMENT:                            Medication Adherence/Access: No issues identified    HIV: Newly diagnosed HIV, recently started Biktarvy and tolerating well.  Due for updating lab work.      Tobacco Use: Pt continues to smoke and is ready to quit.  Patient would benefit from nicotine replacement therapy.      PLAN:                            - Update HIV labs on the Oct 28th  - Get second covid-19 shot on Oct 28th (has not yet come in for this, first shot was on 8/11/21)  - Start nicotine 2 mg lozenge - 1 lozenge as needed, use when taking your dog for a walk instead of smoking     Follow-up:   Dr. Flores - 11/4  MTM - 11/16, smoking cessation    Chapis Lara, PharmD, BCACP    SUBJECTIVE/OBJECTIVE:                          Aidan Henning is a 21 year old male called for a follow-up visit. He was referred to me from ID Clinic/Dr. Flores.  Today's visit is a follow-up MTM visit from 9/14/2021     Allergies/ADRs: None  Past Medical History: Reviewed in chart  Tobacco: Cut down to 2-3 cigarettes per day  Tobacco Cessation Action Plan:   Pharmacotherapies : other Nicotine replacement  Alcohol: 1-3 beverages / week  Occupation: between jobs, hoping to work at [employer]     Reason for visit: medication check-in, smoking cessation.    Never got his second dose of Moderna.      Pt denies fever, sore throat, cough, stomach upset, nausea, vomiting, constipation, diarrhea, headaches, trouble sleeping, unusual weight loss/gain, unusual muscle aches/cramping.      Medication Adherence/Access:   Patient takes medications directly from bottles, uses phone alarm at 10:10 and 10:30 AM  Patient takes medications 1 time(s) per day.   Per patient, missed a few times; woke up too late  Medication barriers: none.   The patient fills medications at [place]: YES    HIV dx 6/2021  Current medications:   Biktarvy - 1 tablet once daily - AM     Past  medications: None     Mutations (reviewed 8/11/2021):   NNRTI: I178M  PI: E35D, L63T, A71V     HLA B 5701: negative    Not currently sexually active, has not been in several months    Tobacco Use  Tobacco: Cut down to 2-3 cigarettes per day, when he walks his dog     Past medications:  Nicotine gum - did not like this     Today's Vitals: There were no vitals taken for this visit.    BP Readings from Last 3 Encounters:   08/05/21 (!) 144/75      No results for input(s): CHOL, HDL, LDL, TRIG, CHOLHDLRATIO in the last 22772 hours.  No results found for: A1C   Sodium   Date Value Ref Range Status   06/10/2021 143 133 - 144 mmol/L Final     Potassium   Date Value Ref Range Status   06/10/2021 4.0 3.4 - 5.3 mmol/L Final     Chloride   Date Value Ref Range Status   06/10/2021 112 (H) 94 - 109 mmol/L Final     Carbon Dioxide   Date Value Ref Range Status   06/10/2021 25 20 - 32 mmol/L Final     Anion Gap   Date Value Ref Range Status   06/10/2021 6 3 - 14 mmol/L Final     Glucose   Date Value Ref Range Status   06/10/2021 93 70 - 99 mg/dL Final     Urea Nitrogen   Date Value Ref Range Status   06/10/2021 7 7 - 30 mg/dL Final     Creatinine   Date Value Ref Range Status   06/10/2021 0.90 0.66 - 1.25 mg/dL Final     GFR Estimate   Date Value Ref Range Status   06/10/2021 >90 >60 mL/min/[1.73_m2] Final     Comment:     Non  GFR Calc  Starting 12/18/2018, serum creatinine based estimated GFR (eGFR) will be   calculated using the Chronic Kidney Disease Epidemiology Collaboration   (CKD-EPI) equation.       Calcium   Date Value Ref Range Status   06/10/2021 9.1 8.5 - 10.1 mg/dL Final       Lab Results   Component Value Date    HIV-1 RNA Quant Result 6,123 06/10/2021    HIV RNA QT Log 3.8 06/10/2021    HIV 2 Result Nonreactive 06/10/2021    HIV Interpretation See Comment Below 06/10/2021     Component      Latest Ref Rng & Units 6/10/2021   IFC Specimen       Blood   CD3 Mature T      49 - 84 % 81   CD4 [place] T       28 - 63 % 20 (L)   CD8 Suppressor T      10 - 40 % 59 (H)   CD4:CD8 Ratio      1.40 - 2.60 0.34 (L)   Absolute CD3      603 - 2,990 cells/uL 2,497   Absolute CD4      441 - 2,156 cells/uL 606   Absolute CD8      125 - 1,312 cells/uL 1,830 (H)     Lab Results   Component Value Date    CR 0.90 06/10/2021      Recent Labs   Lab Test 06/10/21  1255   WBC 4.5   RBC 4.88   HGB 14.7   HCT 43.3   MCV 89   MCH 30.1   MCHC 33.9   RDW 11.5         Recent Labs   Lab Test 06/10/21  1255   PROTTOTAL 8.0   ALBUMIN 3.8   BILITOTAL 0.4   ALKPHOS 93   AST 27   ALT 38      ----------------    I spent 19 minutes with this patient today. All changes were made via collaborative practice agreement with Dr. Flores. A copy of the visit note was provided to the patient's referring provider.  The patient was sent via [employer] a summary of these recommendations.       Telemedicine Visit Details  Type of service:  Telephone visit  Start Time: 9:46 AM  End Time: 10:05 AM  Originating Location (patient location): Home  Distant Location (provider location):  [facility] AND INFECTIOUS DISEASES MTM     Medication Therapy Recommendations  No medication therapy recommendations to display

## 2021-10-12 NOTE — PATIENT INSTRUCTIONS
"Recommendations from today's MTM visit:                                                      1) Start using the Nicotine 2 mg Lozenge -- suck on this instead of reaching for a cigarette when you are walking your dog.  You will suck on this until it gets peppery/tingly and then place it in your cheek.  Do not chew or swallow the lozenge.  One lozenge should last for 15-30 minutes.      2) Also try to have something like a pen or straw to keep your fingers \"busy\" or occupied instead of reaching for the cigarettes.      3) Update your labs on 10/28/2021 at noon.    4) Also get your second dose of your covid-19 vaccine when you come in for your lab work.      5) GOOD LUCK with starting a new job, hopefully [employer] will work out!    Next MTM visit: I will call you again on 11/16/2021 to see how you are doing with quitting your cigarettes.    To schedule another MTM appointment, please call the clinic directly or you may call the MTM scheduling line at [phone] or toll-free at [phone].     I value your experience and would be very thankful for your time with providing feedback on our clinic survey. You may receive a survey via email or text message in the next few days.     Please feel free to contact me with any questions or concerns you have.      Take care!  Chapis Lara, PharmD, BCACP          "

## 2021-10-28 ENCOUNTER — LAB (OUTPATIENT)
Dept: LAB | Facility: CLINIC | Age: 21
End: 2021-10-28
Payer: COMMERCIAL

## 2021-10-28 DIAGNOSIS — Z21 ASYMPTOMATIC HUMAN IMMUNODEFICIENCY VIRUS (HIV) INFECTION STATUS (H): ICD-10-CM

## 2021-10-28 LAB
ALBUMIN SERPL-MCNC: 3.4 G/DL (ref 3.4–5)
ALP SERPL-CCNC: 87 U/L (ref 40–150)
ALT SERPL W P-5'-P-CCNC: 41 U/L (ref 0–70)
ANION GAP SERPL CALCULATED.3IONS-SCNC: 2 MMOL/L (ref 3–14)
AST SERPL W P-5'-P-CCNC: 26 U/L (ref 0–45)
BASOPHILS # BLD AUTO: 0 10E3/UL (ref 0–0.2)
BASOPHILS NFR BLD AUTO: 1 %
BILIRUB SERPL-MCNC: 0.4 MG/DL (ref 0.2–1.3)
BUN SERPL-MCNC: 10 MG/DL (ref 7–30)
CALCIUM SERPL-MCNC: 8.8 MG/DL (ref 8.5–10.1)
CHLORIDE BLD-SCNC: 112 MMOL/L (ref 94–109)
CO2 SERPL-SCNC: 27 MMOL/L (ref 20–32)
CREAT SERPL-MCNC: 1 MG/DL (ref 0.66–1.25)
EOSINOPHIL # BLD AUTO: 0.3 10E3/UL (ref 0–0.7)
EOSINOPHIL NFR BLD AUTO: 5 %
ERYTHROCYTE [DISTWIDTH] IN BLOOD BY AUTOMATED COUNT: 11.9 % (ref 10–15)
GFR SERPL CREATININE-BSD FRML MDRD: >90 ML/MIN/1.73M2
GLUCOSE BLD-MCNC: 119 MG/DL (ref 70–99)
HCT VFR BLD AUTO: 41.5 % (ref 40–53)
HGB BLD-MCNC: 14.1 G/DL (ref 13.3–17.7)
IMM GRANULOCYTES # BLD: 0 10E3/UL
IMM GRANULOCYTES NFR BLD: 1 %
LYMPHOCYTES # BLD AUTO: 2.6 10E3/UL (ref 0.8–5.3)
LYMPHOCYTES NFR BLD AUTO: 46 %
MCH RBC QN AUTO: 32 PG (ref 26.5–33)
MCHC RBC AUTO-ENTMCNC: 34 G/DL (ref 31.5–36.5)
MCV RBC AUTO: 94 FL (ref 78–100)
MONOCYTES # BLD AUTO: 0.6 10E3/UL (ref 0–1.3)
MONOCYTES NFR BLD AUTO: 10 %
NEUTROPHILS # BLD AUTO: 2.1 10E3/UL (ref 1.6–8.3)
NEUTROPHILS NFR BLD AUTO: 37 %
NRBC # BLD AUTO: 0 10E3/UL
NRBC BLD AUTO-RTO: 0 /100
PLATELET # BLD AUTO: 293 10E3/UL (ref 150–450)
POTASSIUM BLD-SCNC: 3.8 MMOL/L (ref 3.4–5.3)
PROT SERPL-MCNC: 7 G/DL (ref 6.8–8.8)
RBC # BLD AUTO: 4.41 10E6/UL (ref 4.4–5.9)
SODIUM SERPL-SCNC: 141 MMOL/L (ref 133–144)
WBC # BLD AUTO: 5.5 10E3/UL (ref 4–11)

## 2021-10-28 PROCEDURE — 80053 COMPREHEN METABOLIC PANEL: CPT | Performed by: PATHOLOGY

## 2021-10-28 PROCEDURE — 99000 SPECIMEN HANDLING OFFICE-LAB: CPT | Performed by: PATHOLOGY

## 2021-10-28 PROCEDURE — 86360 T CELL ABSOLUTE COUNT/RATIO: CPT | Mod: 90 | Performed by: PATHOLOGY

## 2021-10-28 PROCEDURE — 85025 COMPLETE CBC W/AUTO DIFF WBC: CPT | Performed by: PATHOLOGY

## 2021-10-28 PROCEDURE — 87536 HIV-1 QUANT&REVRSE TRNSCRPJ: CPT | Mod: 90 | Performed by: PATHOLOGY

## 2021-10-28 PROCEDURE — 86359 T CELLS TOTAL COUNT: CPT | Mod: 90 | Performed by: PATHOLOGY

## 2021-10-28 PROCEDURE — 36415 COLL VENOUS BLD VENIPUNCTURE: CPT | Performed by: PATHOLOGY

## 2021-10-29 LAB
CD3 CELLS # BLD: 1945 CELLS/UL (ref 603–2990)
CD3 CELLS NFR BLD: 72 % (ref 49–84)
CD3+CD4+ CELLS # BLD: 808 CELLS/UL (ref 441–2156)
CD3+CD4+ CELLS NFR BLD: 30 % (ref 28–63)
CD3+CD4+ CELLS/CD3+CD8+ CLL BLD: 0.75 % (ref 1.4–2.6)
CD3+CD8+ CELLS # BLD: 1082 CELLS/UL (ref 125–1312)
CD3+CD8+ CELLS NFR BLD: 40 % (ref 10–40)
HIV1 RNA # PLAS NAA DL=20: NOT DETECTED COPIES/ML
T CELL COMMENT: ABNORMAL

## 2021-11-16 ENCOUNTER — VIRTUAL VISIT (OUTPATIENT)
Dept: PHARMACY | Facility: CLINIC | Age: 21
End: 2021-11-16
Payer: COMMERCIAL

## 2021-11-16 DIAGNOSIS — Z21 ASYMPTOMATIC HUMAN IMMUNODEFICIENCY VIRUS (HIV) INFECTION STATUS (H): Primary | ICD-10-CM

## 2021-11-16 DIAGNOSIS — Z72.0 TOBACCO ABUSE: ICD-10-CM

## 2021-11-16 PROCEDURE — 99606 MTMS BY PHARM EST 15 MIN: CPT | Performed by: PHARMACIST

## 2021-11-16 PROCEDURE — 99607 MTMS BY PHARM ADDL 15 MIN: CPT | Performed by: PHARMACIST

## 2021-11-16 NOTE — PATIENT INSTRUCTIONS
Recommendations from today's MTM visit:                                                      1) Switch the time you are taking you Biktarvy - take it in the morning before you go to work.     2) Unfortunately you are not able to donate blood or plasma.      3) You don't have to wait until your January appt to get your second covid shot or your flu shot!  You can get these at any local pharmacy and I would recommend updating these whenever you can :)    4) Stop the cigarettes!  Be careful with the vape too -- don't over do this and try to only use this as a transition off cigarettes until you stop this completely.      Next MTM visit: I will call you again on 12/22/2022.    I value your experience and would be very thankful for your time with providing feedback on our clinic survey. You may receive a survey via email or text message in the next few days.     Please feel free to contact me with any questions or concerns you have.  I can be reached at [phone].    Take care!  Chapis Lara, PharmD, BCACP

## 2021-11-16 NOTE — PROGRESS NOTES
Medication Therapy Management (MTM) Encounter    ASSESSMENT:                            Medication Adherence/Access: No issues identified    HIV: Diagnosed 6/2021, recently updated labs on 10/28/2021 show an undetectable viral load with a CD4 of 808.    Tobacco Use: Not using nicotine replacement therapy, is planning on switching to a vape device.      PLAN:                            - Ok to switching timing of Biktarvy, take upon waking in the morning  - Reviewed that given diagnosis of HIV he should not donate blood/plasma  - Encouraged him to get second covid shot and flu shot as soon as able, does not need to be at [facility] pharmacy  - Encouraged smoking cessation    Follow-up: 1 month    Chapis Lara, PharmD, BCACP    SUBJECTIVE/OBJECTIVE:                          Aidan Henning is a 21 year old male called for a follow-up visit. He was referred to me from Dr. Flores/ID Clinic.  Today's visit is a follow-up MTM visit from 10/12/2021.     Allergies/ADRs: None  Past Medical History: Reviewed in chart  Tobacco: He reports that he has been smoking. He has never used smokeless tobacco.  Alcohol: 1-3 beverages / week  Occupation: Recently started at [employer], 9-5 PM off on Wednesday and Thursday and every other Tuesday    Reason for visit: Medication check-in, wondering if he can donate plasma.      Overall he is doing well.  He has had 2 episodes abdominal/flank cramping that woke him up from his sleep; most recently happened 3 nights ago; no other pain or concerns and has now resolved.  He also was having some trouble sleeping - tossing and turning at night and couldn't sleep, however this is better now.      While he did come in to update labs, he did not have time for his second covid shot or flu shot - states he will complete these when he comes in for ID visit in January.      Medication Adherence/Access:   Patient takes medications directly from bottles.  Patient takes medications 1 time(s) per day.   Per  patient, misses medication 0 times per week.   Medication barriers: none.   The patient fills medications at [facility]: YES.    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 He is otherwise tolerating Biktarvy well - previously he was tired all the time and he now feels like he wants to go outside and go to work, etc;.  He does have some diarrhea which is related to his eating habits and worse if he doesn't eat all day long, he otherwise denies stomach upset, nausea, vomiting.      Tobacco Use  Tobacco: Continues to smoke 3-4 cigarettes per day    Still interested in quitting cigarettes, plans on starting to vape when he gets his first paycheck     Today's Vitals: There were no vitals taken for this visit.     BP Readings from Last 3 Encounters:   08/05/21 (!) 144/75      No results for input(s): CHOL, HDL, LDL, TRIG, CHOLHDLRATIO in the last 59551 hours.  No results found for: A1C   Sodium   Date Value Ref Range Status   10/28/2021 141 133 - 144 mmol/L Final   06/10/2021 143 133 - 144 mmol/L Final     Potassium   Date Value Ref Range Status   10/28/2021 3.8 3.4 - 5.3 mmol/L Final   06/10/2021 4.0 3.4 - 5.3 mmol/L Final     Chloride   Date Value Ref Range Status   10/28/2021 112 (H) 94 - 109 mmol/L Final   06/10/2021 112 (H) 94 - 109 mmol/L Final     Carbon Dioxide   Date Value Ref Range Status   06/10/2021 25 20 - 32 mmol/L Final     Carbon Dioxide (CO2)   Date Value Ref Range Status   10/28/2021 27 20 - 32 mmol/L Final     Anion Gap   Date Value Ref Range Status   10/28/2021 2 (L) 3 - 14 mmol/L Final   06/10/2021 6 3 - 14 mmol/L Final     Glucose    Date Value Ref Range Status   10/28/2021 119 (H) 70 - 99 mg/dL Final   06/10/2021 93 70 - 99 mg/dL Final     Urea Nitrogen   Date Value Ref Range Status   10/28/2021 10 7 - 30 mg/dL Final   06/10/2021 7 7 - 30 mg/dL Final     Creatinine   Date Value Ref Range Status   10/28/2021 1.00 0.66 - 1.25 mg/dL Final   06/10/2021 0.90 0.66 - 1.25 mg/dL Final     GFR Estimate   Date Value Ref Range Status   10/28/2021 >90 >60 mL/min/1.73m2 Final     Comment:     As of July 11, 2021, eGFR is calculated by the CKD-EPI creatinine equation, without race adjustment. eGFR can be influenced by muscle mass, exercise, and diet. The reported eGFR is an estimation only and is only applicable if the renal function is stable.   06/10/2021 >90 >60 mL/min/[1.73_m2] Final     Comment:     Non  GFR Calc  Starting 12/18/2018, serum creatinine based estimated GFR (eGFR) will be   calculated using the Chronic Kidney Disease Epidemiology Collaboration   (CKD-EPI) equation.       Calcium   Date Value Ref Range Status   10/28/2021 8.8 8.5 - 10.1 mg/dL Final   06/10/2021 9.1 8.5 - 10.1 mg/dL Final        Component      Latest Ref Rng & Units 10/28/2021   CD3 Mature T      49 - 84 % 72   CD4 [place] T      28 - 63 % 30   CD8 Suppressor T      10 - 40 % 40   CD4:CD8 Ratio      1.40 - 2.60 0.75 (L)   Absolute CD3      603-2,990 cells/uL 1,945   Absolute CD4      441-2,156 cells/uL 808   Absolute CD8      125-1,312 cells/uL 1,082   HIV-1 RNA Quant Result      Not Detected Copies/mL Not Detected       Lab Results   Component Value Date    CR 1.00 10/28/2021    CR 0.90 06/10/2021      Recent Labs   Lab Test 10/28/21  1614   WBC 5.5   RBC 4.41   HGB 14.1   HCT 41.5   MCV 94   MCH 32.0   MCHC 34.0   RDW 11.9         Recent Labs   Lab Test 10/28/21  1614   PROTTOTAL 7.0   ALBUMIN 3.4   BILITOTAL 0.4   ALKPHOS 87   AST 26   ALT 41      ----------------    I spent 17 minutes with this patient today. All changes were made via  collaborative practice agreement with Dr. Flores. A copy of the visit note was provided to the patient's referring provider.The patient was sent via [employer] a summary of these recommendations.     Telemedicine Visit Details  Type of service:  Telephone visit  Start Time: 12:06 PM  End Time: 12:33 PM  Originating Location (patient location): [place]  Distant Location (provider location):  [facility] AND INFECTIOUS DISEASES [facility]     Medication Therapy Recommendations  Asymptomatic human immunodeficiency virus (HIV) infection status (H)    Current Medication: bictegravir-emtricitabine-tenofovir (BIKTARVY) -25 MG per tablet   Rationale: Patient forgets to take - Adherence - Adherence   Recommendation: Provide Adherence Intervention - Biktarvy -25 MG Tabs   Status: Patient Agreed - Adherence/Education

## 2021-12-14 ENCOUNTER — ALLIED HEALTH/NURSE VISIT (OUTPATIENT)
Dept: INFECTIOUS DISEASES | Facility: CLINIC | Age: 21
End: 2021-12-14
Payer: COMMERCIAL

## 2021-12-14 DIAGNOSIS — Z11.3 ROUTINE SCREENING FOR STI (SEXUALLY TRANSMITTED INFECTION): ICD-10-CM

## 2021-12-14 DIAGNOSIS — Z21 ASYMPTOMATIC HUMAN IMMUNODEFICIENCY VIRUS (HIV) INFECTION STATUS (H): ICD-10-CM

## 2021-12-14 PROCEDURE — 87491 CHLMYD TRACH DNA AMP PROBE: CPT

## 2021-12-14 PROCEDURE — 87591 N.GONORRHOEAE DNA AMP PROB: CPT

## 2021-12-15 LAB
C TRACH DNA SPEC QL NAA+PROBE: NEGATIVE
C TRACH DNA SPEC QL NAA+PROBE: POSITIVE
N GONORRHOEA DNA SPEC QL NAA+PROBE: NEGATIVE
N GONORRHOEA DNA SPEC QL NAA+PROBE: NEGATIVE

## 2021-12-16 NOTE — PROGRESS NOTES
12/16/2021- [facility] case report submitted for positive Chlamydia result on 12/14/21.    Lala Sandra, Infection Prevention  [phone]

## 2021-12-22 ENCOUNTER — LAB (OUTPATIENT)
Dept: LAB | Facility: CLINIC | Age: 21
End: 2021-12-22

## 2021-12-22 ENCOUNTER — ALLIED HEALTH/NURSE VISIT (OUTPATIENT)
Dept: INFECTIOUS DISEASES | Facility: CLINIC | Age: 21
End: 2021-12-22
Attending: INTERNAL MEDICINE
Payer: COMMERCIAL

## 2021-12-22 ENCOUNTER — VIRTUAL VISIT (OUTPATIENT)
Dept: PHARMACY | Facility: CLINIC | Age: 21
End: 2021-12-22
Payer: COMMERCIAL

## 2021-12-22 DIAGNOSIS — Z23 COVID-19 VACCINE ADMINISTERED: ICD-10-CM

## 2021-12-22 DIAGNOSIS — A74.9 CHLAMYDIA: Primary | ICD-10-CM

## 2021-12-22 DIAGNOSIS — Z23 COVID-19 VACCINE ADMINISTERED: Primary | ICD-10-CM

## 2021-12-22 DIAGNOSIS — A74.9 CHLAMYDIA: ICD-10-CM

## 2021-12-22 DIAGNOSIS — Z21 ASYMPTOMATIC HUMAN IMMUNODEFICIENCY VIRUS (HIV) INFECTION STATUS (H): Primary | ICD-10-CM

## 2021-12-22 DIAGNOSIS — Z11.3 ROUTINE SCREENING FOR STI (SEXUALLY TRANSMITTED INFECTION): ICD-10-CM

## 2021-12-22 PROCEDURE — 91301 HC RX IP 250 OP 636: CPT | Performed by: INTERNAL MEDICINE

## 2021-12-22 PROCEDURE — 99606 MTMS BY PHARM EST 15 MIN: CPT | Performed by: PHARMACIST

## 2021-12-22 PROCEDURE — 99607 MTMS BY PHARM ADDL 15 MIN: CPT | Performed by: PHARMACIST

## 2021-12-22 PROCEDURE — 86780 TREPONEMA PALLIDUM: CPT | Mod: 90 | Performed by: PATHOLOGY

## 2021-12-22 PROCEDURE — 36415 COLL VENOUS BLD VENIPUNCTURE: CPT | Performed by: PATHOLOGY

## 2021-12-22 PROCEDURE — 86592 SYPHILIS TEST NON-TREP QUAL: CPT | Mod: 90 | Performed by: PATHOLOGY

## 2021-12-22 PROCEDURE — 99000 SPECIMEN HANDLING OFFICE-LAB: CPT | Performed by: PATHOLOGY

## 2021-12-22 PROCEDURE — 87591 N.GONORRHOEAE DNA AMP PROB: CPT | Mod: 90 | Performed by: PATHOLOGY

## 2021-12-22 PROCEDURE — 87491 CHLMYD TRACH DNA AMP PROBE: CPT | Mod: 90 | Performed by: PATHOLOGY

## 2021-12-22 PROCEDURE — U0005 INFEC AGEN DETEC AMPLI PROBE: HCPCS | Mod: 90 | Performed by: PATHOLOGY

## 2021-12-22 PROCEDURE — 86593 SYPHILIS TEST NON-TREP QUANT: CPT | Mod: 90 | Performed by: PATHOLOGY

## 2021-12-22 PROCEDURE — 250N000011 HC RX IP 250 OP 636: Performed by: INTERNAL MEDICINE

## 2021-12-22 PROCEDURE — 0012A HC ADMIN COVID VAC MODERNA, 2ND DOSE: CPT | Performed by: INTERNAL MEDICINE

## 2021-12-22 PROCEDURE — U0003 INFECTIOUS AGENT DETECTION BY NUCLEIC ACID (DNA OR RNA); SEVERE ACUTE RESPIRATORY SYNDROME CORONAVIRUS 2 (SARS-COV-2) (CORONAVIRUS DISEASE [COVID-19]), AMPLIFIED PROBE TECHNIQUE, MAKING USE OF HIGH THROUGHPUT TECHNOLOGIES AS DESCRIBED BY CMS-2020-01-R: HCPCS | Mod: 90 | Performed by: PATHOLOGY

## 2021-12-22 RX ORDER — AZITHROMYCIN 500 MG/1
1000 TABLET, FILM COATED ORAL ONCE
Status: CANCELLED
Start: 2021-12-22 | End: 2021-12-22

## 2021-12-22 RX ORDER — DOXYCYCLINE HYCLATE 100 MG
100 TABLET ORAL 2 TIMES DAILY
Qty: 14 TABLET | Refills: 0 | Status: SHIPPED | OUTPATIENT
Start: 2021-12-22 | End: 2021-12-29

## 2021-12-22 RX ADMIN — CX-024414 0.5 ML: 0.2 INJECTION, SUSPENSION INTRAMUSCULAR at 14:32

## 2021-12-22 NOTE — PROGRESS NOTES
Patient presented for 2nd dose of Moderna. No reaction following first dose. Patient advised to be monitored in lobby for 15min afterwards. Consent signed, all questions answered.

## 2021-12-22 NOTE — PROGRESS NOTES
Medication Therapy Management (MTM) Encounter    ASSESSMENT:                            Medication Adherence/Access: No issues identified    HIV: Well controlled on Biktarvy with excellent medication adherence.  On 10/28/2021 he had an undetectable viral load with an absolute CD4 of 808.    Need for covid-9 vaccination: due for his second covid shot, his first was provided on 8/11/2021.    Need for chlamydia treatment: Chlamydia newly returned positive on 12/14/2021.    PLAN:                            - Nurse visit for second covid shot today  - Rx'ed doxycycline 100 mg twice daily, counseled to abstain from sex for at least 7 days and to make sure partners are treated; reviewed U=U for HIV but that consistent condom use will prevent other STIs    Follow-up:   Dr. Flores - 1/20/2022  MTM - 3 tere Lara, PharmD, BCACP    SUBJECTIVE/OBJECTIVE:                          Aidan Henning is a 21 year old male called for a follow-up visit. He was referred to me from Dr. Flores/ID Clinic.  Today's visit is a follow-up MTM visit from 11/16/2021.     Allergies/ADRs: None  Past Medical History: Reviewed in chart  Tobacco: He reports that he has been smoking. He has never used smokeless tobacco.  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      Reason for visit: Medication check-in.    He has been off from work for a week -- they use an sandi to check-in daily and due to reported symptoms of sore throat, cough, and stomach upset from coughing so hard he was asked not to come in to work.  Symptoms have been going on for the past week and are gradually improving.  He denies fever, chills, night sweats.      His chlamydia test from las week also came back positive - pt states he is no longer with this partner and though  something may have been going on as he has had some rectal itching.      He is hoping to spend the holidays at his sisters house in [facility].    Medication Adherence/Access:   Patient takes medications directly from bottles.  Patient takes medications 1 time(s) per day.   Per patient, misses medication 0 times per week.   Medication barriers: had a small mix up with getting his refill on time    The patient fills medications at [facility]: YES - mailed monthly from [facility]     HIV dx 6/2021  Current medications:   Biktarvy - 1 tablet once daily - AM     Missed 2-3 doses when refills lapsed and he was waiting on a refill request    Past medications: None     Mutations (reviewed 8/11/2021):   NNRTI: I178M  PI: E35D, L63T, A71V     HLA B 5701: negative    Today's Vitals: There were no vitals taken for this visit.     BP Readings from Last 3 Encounters:   08/05/21 (!) 144/75      No results for input(s): CHOL, HDL, LDL, TRIG, CHOLHDLRATIO in the last 39649 hours.  No results found for: A1C   Sodium   Date Value Ref Range Status   10/28/2021 141 133 - 144 mmol/L Final   06/10/2021 143 133 - 144 mmol/L Final     Potassium   Date Value Ref Range Status   10/28/2021 3.8 3.4 - 5.3 mmol/L Final   06/10/2021 4.0 3.4 - 5.3 mmol/L Final     Chloride   Date Value Ref Range Status   10/28/2021 112 (H) 94 - 109 mmol/L Final   06/10/2021 112 (H) 94 - 109 mmol/L Final     Carbon Dioxide   Date Value Ref Range Status   06/10/2021 25 20 - 32 mmol/L Final     Carbon Dioxide (CO2)   Date Value Ref Range Status   10/28/2021 27 20 - 32 mmol/L Final     Anion Gap   Date Value Ref Range Status   10/28/2021 2 (L) 3 - 14 mmol/L Final   06/10/2021 6 3 - 14 mmol/L Final     Glucose   Date Value Ref Range Status   10/28/2021 119 (H) 70 - 99 mg/dL Final   06/10/2021 93 70 - 99 mg/dL Final     Urea Nitrogen   Date Value Ref Range Status   10/28/2021 10 7 - 30 mg/dL Final   06/10/2021 7 7 - 30 mg/dL Final     Creatinine   Date Value Ref Range Status    10/28/2021 1.00 0.66 - 1.25 mg/dL Final   06/10/2021 0.90 0.66 - 1.25 mg/dL Final     GFR Estimate   Date Value Ref Range Status   10/28/2021 >90 >60 mL/min/1.73m2 Final     Comment:     As of July 11, 2021, eGFR is calculated by the CKD-EPI creatinine equation, without race adjustment. eGFR can be influenced by muscle mass, exercise, and diet. The reported eGFR is an estimation only and is only applicable if the renal function is stable.   06/10/2021 >90 >60 mL/min/[1.73_m2] Final     Comment:     Non  GFR Calc  Starting 12/18/2018, serum creatinine based estimated GFR (eGFR) will be   calculated using the Chronic Kidney Disease Epidemiology Collaboration   (CKD-EPI) equation.       Calcium   Date Value Ref Range Status   10/28/2021 8.8 8.5 - 10.1 mg/dL Final   06/10/2021 9.1 8.5 - 10.1 mg/dL Final      Component      Latest Ref Rng & Units 10/28/2021   CD3 Mature T      49 - 84 % 72   CD4 [place] T      28 - 63 % 30   CD8 Suppressor T      10 - 40 % 40   CD4:CD8 Ratio      1.40 - 2.60 0.75 (L)   Absolute CD3      603-2,990 cells/uL 1,945   Absolute CD4      441-2,156 cells/uL 808   Absolute CD8      125-1,312 cells/uL 1,082   HIV-1 RNA Quant Result      Not Detected Copies/mL Not Detected     Lab Results   Component Value Date    CR 1.00 10/28/2021    CR 0.90 06/10/2021      Recent Labs   Lab Test 10/28/21  1614   WBC 5.5   RBC 4.41   HGB 14.1   HCT 41.5   MCV 94   MCH 32.0   MCHC 34.0   RDW 11.9         Recent Labs   Lab Test 10/28/21  1614   PROTTOTAL 7.0   ALBUMIN 3.4   BILITOTAL 0.4   ALKPHOS 87   AST 26   ALT 41      ----------------    I spent 20 minutes with this patient today. All changes were made via collaborative practice agreement with Dr. Flores. A copy of the visit note was provided to the patient's referring provider.  The patient was sent via [employer] a summary of these recommendations.      Medication Therapy Recommendations  Chlamydia    Rationale: Untreated condition -  Needs additional medication therapy - Indication   Recommendation: Start Medication - Ordered per RN protocol   Status: Accepted - no CPA Needed

## 2021-12-22 NOTE — PATIENT INSTRUCTIONS
Recommendations from today's MTM visit:                                                      1) For the chlamydia: doxycycline 100 mg - 1 tablet twice daily for 7 days. Pick this up in the pharmacy today.    Abstain from sex for 7 days.    Make sure your partners are also tested and treated for sexually transmitted infections.    Remember that  An undetectable viral load means you cannot transmit HIV, however without regular condom use you can still get other sexually transmitted infections.      2) Our nurse will give you your second covid-19 shot today.    3) Stop downstairs at the lab for your covid test.    Next MTM visit: 3 months -- we will check in on how you are doing quitting the cigarettes since we didn't get a chance to talk about that today.    I value your experience and would be very thankful for your time with providing feedback on our clinic survey. You may receive a survey via email or text message in the next few days.     Please feel free to contact me with any questions or concerns you have.  I can be reached at [phone].    Take care!  Chapis Lara, PharmD, BCACP

## 2021-12-23 LAB
C TRACH DNA SPEC QL NAA+PROBE: NEGATIVE
N GONORRHOEA DNA SPEC QL NAA+PROBE: NEGATIVE
SARS-COV-2 RNA RESP QL NAA+PROBE: NEGATIVE
T PALLIDUM AB SER QL: REACTIVE

## 2021-12-24 LAB
RPR SER QL: REACTIVE
RPR SER-TITR: ABNORMAL {TITER}

## 2022-02-17 DIAGNOSIS — Z21 ASYMPTOMATIC HUMAN IMMUNODEFICIENCY VIRUS (HIV) INFECTION STATUS (H): ICD-10-CM

## 2022-02-17 NOTE — TELEPHONE ENCOUNTER
Attempted to contact the patient to for refill reminder call,  no ability to leave a message, will send email.      Last filled on: 01/19/22    Follow-up Date: 02/23/22 2nd attempt    Yuki Bright CPhT  [facility]  [phone]

## 2022-03-16 ENCOUNTER — TELEPHONE (OUTPATIENT)
Dept: PHARMACY | Facility: CLINIC | Age: 22
End: 2022-03-16
Payer: COMMERCIAL

## 2022-03-16 NOTE — TELEPHONE ENCOUNTER
Attempted to contact the patient to for refill reminder call,  left message on voicemail    Last filled on: 02/23/22    Follow-up Date: 03/22/22 2nd attempt    Yuki Bright CPhT  [facility]  [phone]

## 2022-03-23 ENCOUNTER — VIRTUAL VISIT (OUTPATIENT)
Dept: PHARMACY | Facility: CLINIC | Age: 22
End: 2022-03-23
Payer: COMMERCIAL

## 2022-03-23 DIAGNOSIS — Z21 ASYMPTOMATIC HUMAN IMMUNODEFICIENCY VIRUS (HIV) INFECTION STATUS (H): Primary | ICD-10-CM

## 2022-03-23 DIAGNOSIS — Z72.0 TOBACCO ABUSE: ICD-10-CM

## 2022-03-23 PROCEDURE — 99605 MTMS BY PHARM NP 15 MIN: CPT | Performed by: PHARMACIST

## 2022-03-23 NOTE — PROGRESS NOTES
Medication Therapy Management (MTM) Encounter    ASSESSMENT:                            Medication Adherence/Access: No issues identified    HIV: Diagnosed 6/2021 and soon after started on therapy with Biktarvy - excellent adherence and when labs last checked 10/2021 he had an undetectable viral load and CD4 of 808.    Tobacco Use: Patient continues to smoke about 2 cigarettes per day, he is not interested in additional support.    PLAN:                            - Update lab work before upcoming visit with Dr. Flores    Follow-up: as needed    Chapis Lara, PharmD, BCACP    SUBJECTIVE/OBJECTIVE:                          Aidan Henning is a 21 year old male called for a follow-up visit.  Today's visit is a follow-up MTM visit from 12/22/2021.     Allergies/ADRs: None  Past Medical History: Reviewed in chart  Tobacco: Smoking 2 cigarettes per day  Information offered: Patient not interested at this time  Alcohol: 1-3 beverages / week    Reason for visit: Medication adherence check-in.    He is doing ok - he had an injury to his arm 2 days ago and went to the ED at [facility] where a cast was placed on his arm and he was started on antibiotics (sounds like there may have been 2 separate injuries -- a previous injury a few weeks ago when changing his car tire and then the day he went to the ED had a dog bite).  He states he needs to follow-up with a hand surgeon and they are reaching out to help him schedule this visit.  Because of these injuries he has not been back to work for 2 weeks.      Medication Adherence/Access:   Patient takes medications directly from bottles.  Patient takes medications 1 time(s) per day.   Per patient, misses medication 0 times per week.   Medication barriers: none.   The patient fills medications at [facility]: YES.    HIV dx 6/2021  Current medications:   Biktarvy - 1 tablet once daily - AM     Missed 2-3 doses when refills lapsed and he was waiting on a refill request     Past  medications: None     Mutations (reviewed 8/11/2021):   NNRTI: I178M  PI: E35D, L63T, A71V     HLA B 5701: negative    Tobacco Use  Continues to smoke 2 cigarettes per day which is acceptable him  He is not interested in further discussing or pursuing medication therapy or nicotine replacement therapy today    Today's Vitals: There were no vitals taken for this visit.     BP Readings from Last 3 Encounters:   08/05/21 (!) 144/75      No results for input(s): CHOL, HDL, LDL, TRIG, CHOLHDLRATIO in the last 46730 hours.  No results found for: A1C   Sodium   Date Value Ref Range Status   10/28/2021 141 133 - 144 mmol/L Final   06/10/2021 143 133 - 144 mmol/L Final     Potassium   Date Value Ref Range Status   10/28/2021 3.8 3.4 - 5.3 mmol/L Final   06/10/2021 4.0 3.4 - 5.3 mmol/L Final     Chloride   Date Value Ref Range Status   10/28/2021 112 (H) 94 - 109 mmol/L Final   06/10/2021 112 (H) 94 - 109 mmol/L Final     Carbon Dioxide   Date Value Ref Range Status   06/10/2021 25 20 - 32 mmol/L Final     Carbon Dioxide (CO2)   Date Value Ref Range Status   10/28/2021 27 20 - 32 mmol/L Final     Anion Gap   Date Value Ref Range Status   10/28/2021 2 (L) 3 - 14 mmol/L Final   06/10/2021 6 3 - 14 mmol/L Final     Glucose   Date Value Ref Range Status   10/28/2021 119 (H) 70 - 99 mg/dL Final   06/10/2021 93 70 - 99 mg/dL Final     Urea Nitrogen   Date Value Ref Range Status   10/28/2021 10 7 - 30 mg/dL Final   06/10/2021 7 7 - 30 mg/dL Final     Creatinine   Date Value Ref Range Status   10/28/2021 1.00 0.66 - 1.25 mg/dL Final   06/10/2021 0.90 0.66 - 1.25 mg/dL Final     GFR Estimate   Date Value Ref Range Status   10/28/2021 >90 >60 mL/min/1.73m2 Final     Comment:     As of July 11, 2021, eGFR is calculated by the CKD-EPI creatinine equation, without race adjustment. eGFR can be influenced by muscle mass, exercise, and diet. The reported eGFR is an estimation only and is only applicable if the renal function is stable.    06/10/2021 >90 >60 mL/min/[1.73_m2] Final     Comment:     Non  GFR Calc  Starting 12/18/2018, serum creatinine based estimated GFR (eGFR) will be   calculated using the Chronic Kidney Disease Epidemiology Collaboration   (CKD-EPI) equation.       Calcium   Date Value Ref Range Status   10/28/2021 8.8 8.5 - 10.1 mg/dL Final   06/10/2021 9.1 8.5 - 10.1 mg/dL Final       Lab Results   Component Value Date    HIV-1 RNA Quant Result 6,123 06/10/2021    HIV-1 RNA copies/mL Not Detected 10/28/2021    HIV RNA QT Log 3.8 06/10/2021    HIV 2 Result Nonreactive 06/10/2021    HIV Interpretation See Comment Below 06/10/2021            Component      Latest Ref Rng & Units 10/28/2021   IFC Specimen          CD3 Mature T      49 - 84 % 72   CD4 [place] T      28 - 63 % 30   CD8 Suppressor T      10 - 40 % 40   CD4:CD8 Ratio      1.40 - 2.60 0.75 (L)   Absolute CD3      603-2,990 cells/uL 1,945   Absolute CD4      441-2,156 cells/uL 808   Absolute CD8      125-1,312 cells/uL 1,082   HIV-1 RNA Quant Result      Not Detected Copies/mL Not Detected     Lab Results   Component Value Date    CR 1.00 10/28/2021    CR 0.90 06/10/2021      Recent Labs   Lab Test 10/28/21  1614   WBC 5.5   RBC 4.41   HGB 14.1   HCT 41.5   MCV 94   MCH 32.0   MCHC 34.0   RDW 11.9         Recent Labs   Lab Test 10/28/21  1614   PROTTOTAL 7.0   ALBUMIN 3.4   BILITOTAL 0.4   ALKPHOS 87   AST 26   ALT 41      ----------------    I spent 7 minutes with this patient today. All changes were made via collaborative practice agreement with Dr. Flores. A copy of the visit note was provided to the patient's provider(s).  The patient was sent via [employer] a summary of these recommendations.       Telemedicine Visit Details  Type of service:  Telephone visit  Start Time: 11:42 AM  End Time: 11:49 AM  Originating Location (patient location): Home  Distant Location (provider location):  [facility] INFECTIOUS DISEASES [facility]      Medication Therapy Recommendations  No medication therapy recommendations to display

## 2022-03-23 NOTE — PATIENT INSTRUCTIONS
Recommendations from today's MTM visit:                                                      1) I'm sorry to hear about your arm -- let us know if you need any assistance getting a visit with the hand surgeon.     2) Keep up the EXCELLENT work taking your Biktarvy every day!    3) Please update your lab work before your upcoming appointment with Dr. Flores.  These can be completed at any [facility] location with a lab.    Next MTM visit: as needed    I value your experience and would be very thankful for your time with providing feedback on our clinic survey. You may receive a survey via email or text message in the next few days.     Please feel free to contact me with any questions or concerns you have.  I can be reached at [phone].    Take care!  Chapis Lara, PharmD, BCACP

## 2022-04-05 NOTE — Clinical Note
[facility] Jones Dr. Rose's Discharge Instructions:  The first week after your joint replacement is a time to recover from the surgery. We expect light exercise to keep you active and mobile. Dr. Rose requires a walker or cane for most patients in the first few days following surgery to try to prevent falls or complications.     Most patients are prescribed two medications for pain control: a narcotic such as Oxycodone or Hydrocodone and a milder medication called Tramadol. These can be alternated for pain control, and the priority is to decrease use of the stronger narcotic as soon as tolerated.   Take your pain medication as appropriate to ensure that your pain is not limiting your recovery. You'll be seen in clinic in 7-10 days (this appointment has already been made, call our office if you're unsure of the time). At that time, we'll prescribe your physical therapy to help with the recovery phase.     -Keep dressing clean and dry. Leave dressing in place until follow up. If you have an incisional vac dressing, the battery may die before your first post operative appointment, but you can leave the surgical dressing in place and it will be removed at your clinic visit. The battery of the dressing may die, and the sponge will inflate because it is no longer holding suction. You can still leave the dressing in place and it will be removed at the office. Call the office if you notice drainage from the surgical dressing.    -OK to shower, keep dressing in place. Pat dressing dry, do not rub incision    -Do not soak incision in bath, hot tub or pool    -Follow up with Dr. Rose at regularly scheduled time    -Call [facility] office at [phone] for questions    -Weight bearing status: touchdown weight bearing with walker and posterior hip precautions    -Take medication as prescribed for DVT prophylaxis    Discharge Instructions    Discharged to home by car with relative. Discharged via wheelchair, hospital escort: Yes.  Special  equipment needed: Not Applicable    Be sure to schedule a follow-up appointment with your primary care doctor or any specialists as instructed.     Discharge Plan:   Diet Plan: Discussed  Activity Level: Discussed  Confirmed Follow up Appointment: Appointment Scheduled  Confirmed Symptoms Management: Discussed  Medication Reconciliation Updated: Yes    I understand that a diet low in cholesterol, fat, and sodium is recommended for good health. Unless I have been given specific instructions below for another diet, I accept this instruction as my diet prescription.   Other diet: regular    Special Instructions: Discharge instructions for the Orthopedic Patient    Follow up with Primary Care Physician within 2 weeks of discharge to home, regarding:  Review of medications and diagnostic testing.  Surveillance for medical complications.  Workup and treatment of osteoporosis, if appropriate.     -Is this a Hip/Knee/Shoulder Joint Replacement patient? Yes   TOTAL HIP REPLACEMENT, AFTER-CARE GUIDELINES     These instructions provide you with information on caring for yourself and your hip after surgery. Your health care provider may also give you instructions that are more specific. Your treatment was planned and performed according to current medical practices but problems sometimes occur. Call your health care provider if you have any problems or questions.     WHAT TO EXPECT AFTER THE PROCEDURE   After your procedure, your hip will typically be stiff, sore, and bruised. This will improve over time.     Pain   · Follow your home pain management plan as discussed with your nurse and as directed by your provider.   · It is important to follow any scheduled pain medications for maximal pain relief.   · If prescribed opioid medication, the goal is to use opioids only as needed and to wean off prescription pain medicine as soon as possible.   · Ice use for pain control.  · Put ice in a plastic bag.   · Place a towel between  your skin and the bag.   · Leave the ice on for 20 minutes, 2-3 times a day at a minimum.   · Most patients are off the pain pills by 3 weeks. If your pain continues to be severe, follow up with your provider.     Infection   Deep hip joint infections that require removal of the prostheses occur in less than 1.0% of patients. Lesser infections in the skin (cellulites) are more common and much more easily treated.   · Keep the incision as clean and dry as possible.   · Always wash your hands before touching your incision.   · Avoid dental care for 3 months after surgery. Your provider may recommend taking a dose of antibiotics an hour prior to any dental procedure. After 2 years, most providers recommend antibiotics only before an extensive procedure. Ask your provider what they recommend.   · Signs and symptoms of infection include low-grade fever, redness, pain, swelling and drainage from your incision. Notify your provider IMMEDIATELY if you develop ANY of these symptoms.     Post op Disturbances   · Bowel Habits - constipation is extremely common and caused by a combination of anesthesia, lack of mobility, dehydration and pain medicine. Use stool softeners or laxatives if necessary. It is important not to ignore this problem as bowel obstructions can be a serious complication after joint replacement surgery.   · Mood/Energy Level - Many patients experience a lack of energy and endurance for up to 2-3 months after surgery. Some people feel down and can even become depressed. This is likely due to postoperative anemia, change in activity level, lack of sleep, pain medicine and just the emotional reaction to the surgery itself that is a big disruption in a person’s life. This usually passes. If symptoms persist, follow up with your primary care provider.   · Returning to Work - Your provider will give you specific instructions based on your profession. Generally, if you work a sedentary job requiring little standing  or walking, most patients may return within 2-6 weeks. Manual labor jobs involving walking, lifting and standing may take 3-4 months. Your provider’s office can provide a release to part-time or light duty work early on in your recovery and progress you to full duty as able.   · Driving - You can begin driving once cleared by your provider, provided you are no longer taking narcotic pain medication or any other medications that impair driving. Discuss the length of time with your doctor as returning to driving will depend on things such as your vehicle, which hip was replaced (right or left) and if you do or do not have post-operative movement precautions.   · Avoiding falls - A fall during the first few weeks after surgery can damage your new hip and may result in a need for further surgery.  throw rugs and tack down loose carpeting. Be aware of floor hazards such as pets, small objects or uneven surfaces. Notify your provider of any falls.   · Airport Metal Detectors - The sensitivity of metal detectors varies and it is likely that your prosthesis will cause an alarm. Inform the  of your artificial joint.     Diet   · Resume your normal diet as tolerated.   · It is important to achieve a healthy nutritional status by eating a well-balanced diet on a regular basis.   · Your provider may recommend that you take iron and vitamin supplements.   · Continue to drink plenty of fluids.     Shower/Bathing   · You may shower as soon as you get home from the hospital unless otherwise instructed.   · Keep your incision out of water to prevent infection. To keep the incision dry when showering, cover it with a plastic bag or plastic wrap. If your bandage is waterproof, this may not be necessary.   · Pat incision dry if it gets wet. Do not rub. Notify your provider.   · Do not submerge in a bath until cleared by your provider. Your staples must be out and the incision completely healed.     Dressing  Changes: Only change your dressing if directed by your provider.   · Wash hands.   · Open all dressing change materials.   · Remove old dressing and discard.   · Inspect incision for signs of irritation or infection including redness, increase in clear drainage, yellow/green drainage, odor and surrounding skin hot to touch. Notify your provider if present.   ·  the new dressing by one corner and lay over the incision. Be careful not to touch the inside of the dressing that will lay over the incision.   · Secure in place as instructed.     Swelling/Bruising   · Swelling is normal after hip replacement and can involve the thigh, knee, calf and foot.   · Swelling can last from 3-6 months.   · To reduce swelling, elevate your leg higher than your heart while reclining. The first week you are home you should elevate your leg an equal amount of time as you are active.   · The swelling is usually worse after you go home since you are upright for longer periods of time.   · Bruising often does not appear until after you arrive home and can be quite dramatic- appearing purple, black, or green. Bruising is typically not concerning and will subside without any treatment.     Blood Clot Prevention   Your treatment plan includes multiple preventative measures to decrease the risk of blood clots in the legs (DVTs) and the less common, but serious, clots that travel to the lungs (pulmonary emboli). Most patients are at standard risk for them, but people who are at higher risk include those who have had previous clots, a family history of clotting, smoking, diabetes, obesity, advanced age, use estrogen and/or live a sedentary lifestyle.     · Signs of blood clots in legs include - Swelling in thigh, calf or ankle that does not go down with elevation. Pain, heat and tenderness in calf, back of calf or groin area. NOTE: blood clots can occur in either leg.   · Signs of blood clots in lungs include - Sudden increased shortness  of breath, sudden onset of chest pain, and localized chest pain with coughing.   · If you experience any of the above symptoms, notify your provider and seek medical attention immediately.   · You received anticoagulant therapy (blood thinners) in the hospital. Continue the prescribed blood-thinning medication at home, as directed by your provider.   · Your risk for developing a clot continues for up to 2-3 months after surgery. Avoid prolonged sitting and dehydration (long air trips and car trips). If you do take a trip during this time, please get up, move around every 1-1.5 hours, and discuss all travel plans with your provider.     Activity   Once you get home, stay active. The key is not to overdo it. While you can expect some good days and some bad days, you should notice a gradual improvement over time and a gradual increase in endurance over the next 6 to 12 months.     · Weight Bearing - You can begin to bear weight as tolerated unless otherwise directed by your provider. Use a walker, crutches or cane to assist with walking until you can walk smoothly (minimal or no limp) without assistance.   · Physical Precautions - To assure proper recovery and tissue healing, you may be asked to take special precautions when moving (sitting, bending or sleeping) - usually for the first 6 weeks after surgery. Precautions vary from patient to patient depending on surgical approach used by your provider. Follow any specific precautions provided by your provider and physical therapist until cleared by your provider.   · Sitting - Early on it is easier to get up from a tall chair or a chair with arms. The physical therapist will show you how to sit and stand from a chair keeping your affected leg out in front of you. Get up and move around on a regular basis--at least once every hour.   · Walking - Walk as much as you like once your doctor gives permission to proceed, but remember that walking is no substitute for your  prescribed exercises.  · Follow the home exercise program prescribed during your hospital stay as directed by your physical therapist or provider.   · Continued physical therapy may be prescribed after hospital discharge to strengthen your muscles and improve your gait/walking pattern. The decision to have therapy or not after the hospital stay is made by you and your provider prior to surgery or at your follow up appointment.   · Swimming is an excellent low impact activity; you can begin as soon as the wound is healed and your provider clears you. Using a pair of training fins may make swimming a more enjoyable and effective exercise.   · Sexual activity - Your provider can tell you when it is safe to resume sexual activity.   · Other activities - Low impact activities are preferred. If you have specific questions, consult your provider.     When to Call the Doctor   Call the provider if you experience:   · Fever over 100.5° F   · Increased pain, drainage, redness, odor or heat around the incision area   · Shaking chills   · Increased knee pain with activity and rest   · Increased pain in calf, tenderness or redness above or below the knee   · Increased swelling of calf, ankle, foot   · Sudden increased shortness of breath, sudden onset of chest pain, localized chest pain with coughing   · Incision opening   Or, if there are any questions or concerns about medications or care.     Infection statistic resource:   https://www.[employer]/contents/prosthetic-joint-infection-epidemiology-microbiology-clinical-manifestations-and-diagnosis     -Is this patient being discharged with medication to prevent blood clots?  Yes, Xarelto Rivaroxaban oral tablets  What is this medicine?  RIVAROXABAN (ri va DANIEL a ban) is an anticoagulant (blood thinner). It is used to treat blood clots in the lungs or in the veins. It is also used to prevent blood clots in the lungs or in the veins. It is also used to lower the chance of stroke  in people with a medical condition called atrial fibrillation.  This medicine may be used for other purposes; ask your health care provider or pharmacist if you have questions.  COMMON BRAND NAME(S): Xarelto, Xarelto Starter Pack  What should I tell my health care provider before I take this medicine?  They need to know if you have any of these conditions:  · antiphospholipid antibody syndrome  · artificial heart valve  · bleeding disorders  · bleeding in the brain  · blood in your stools (black or tarry stools) or if you have blood in your vomit  · history of blood clots  · history of stomach bleeding  · kidney disease  · liver disease  · low blood counts, like low white cell, platelet, or red cell counts  · recent or planned spinal or epidural procedure  · take medicines that treat or prevent blood clots  · an unusual or allergic reaction to rivaroxaban, other medicines, foods, dyes, or preservatives  · pregnant or trying to get pregnant  · breast-feeding  How should I use this medicine?  Take this medicine by mouth with a glass of water. Follow the directions on the prescription label. Take your medicine at regular intervals. Do not take it more often than directed. Do not stop taking except on your doctor's advice. Stopping this medicine may increase your risk of a blood clot. Be sure to refill your prescription before you run out of medicine.  If you are taking this medicine after hip or knee replacement surgery, take it with or without food. If you are taking this medicine for atrial fibrillation, take it with your evening meal. If you are taking this medicine to treat blood clots, take it with food at the same time each day. If you are unable to swallow your tablet, you may crush the tablet and mix it in applesauce. Then, immediately eat the applesauce. You should eat more food right after you eat the applesauce containing the crushed tablet.  Talk to your pediatrician regarding the use of this medicine in  children. Special care may be needed.  Overdosage: If you think you have taken too much of this medicine contact a poison control center or emergency room at once.  NOTE: This medicine is only for you. Do not share this medicine with others.  What if I miss a dose?  If you take your medicine once a day and miss a dose, take the missed dose as soon as you remember. If it is almost time for your next dose, take only that dose. Do not take double or extra doses.  If you take your medicine twice a day and miss a dose, take the missed dose immediately. In this instance, 2 tablets may be taken at the same time. The next day you should take 1 tablet twice a day as directed.  What may interact with this medicine?  Do not take this medicine with any of the following medications:  · defibrotide  This medicine may also interact with the following medications:  · aspirin and aspirin-like medicines  · certain antibiotics like erythromycin, azithromycin, and clarithromycin  · certain medicines for fungal infections like ketoconazole and itraconazole  · certain medicines for irregular heart beat like amiodarone, quinidine, dronedarone  · certain medicines for seizures like carbamazepine, phenytoin  · certain medicines that treat or prevent blood clots like warfarin, enoxaparin, and dalteparin  · conivaptan  · felodipine  · indinavir  · lopinavir; ritonavir  · NSAIDS, medicines for pain and inflammation, like ibuprofen or naproxen  · ranolazine  · rifampin  · ritonavir  · SNRIs, medicines for depression, like desvenlafaxine, duloxetine, levomilnacipran, venlafaxine  · SSRIs, medicines for depression, like citalopram, escitalopram, fluoxetine, fluvoxamine, paroxetine, sertraline  · Crosswicks's wort  · verapamil  This list may not describe all possible interactions. Give your health care provider a list of all the medicines, herbs, non-prescription drugs, or dietary supplements you use. Also tell them if you smoke, drink alcohol, or  use illegal drugs. Some items may interact with your medicine.  What should I watch for while using this medicine?  Visit your healthcare professional for regular checks on your progress. You may need blood work done while you are taking this medicine. Your condition will be monitored carefully while you are receiving this medicine. It is important not to miss any appointments.  Avoid sports and activities that might cause injury while you are using this medicine. Severe falls or injuries can cause unseen bleeding. Be careful when using sharp tools or knives. Consider using an electric razor. Take special care brushing or flossing your teeth. Report any injuries, bruising, or red spots on the skin to your healthcare professional.  If you are going to need surgery or other procedure, tell your healthcare professional that you are taking this medicine.  Wear a medical ID bracelet or chain. Carry a card that describes your disease and details of your medicine and dosage times.  What side effects may I notice from receiving this medicine?  Side effects that you should report to your doctor or health care professional as soon as possible:  · allergic reactions like skin rash, itching or hives, swelling of the face, lips, or tongue  · back pain  · redness, blistering, peeling or loosening of the skin, including inside the mouth  · signs and symptoms of bleeding such as bloody or black, tarry stools; red or dark-brown urine; spitting up blood or brown material that looks like coffee grounds; red spots on the skin; unusual bruising or bleeding from the eye, gums, or nose  · signs and symptoms of a blood clot such as chest pain; shortness of breath; pain, swelling, or warmth in the leg  · signs and symptoms of a stroke such as changes in vision; confusion; trouble speaking or understanding; severe headaches; sudden numbness or weakness of the face, arm or leg; trouble walking; dizziness; loss of coordination  Side effects  that usually do not require medical attention (report to your doctor or health care professional if they continue or are bothersome):  · dizziness  · muscle pain  This list may not describe all possible side effects. Call your doctor for medical advice about side effects. You may report side effects to FDA at 6-848-NSZ-2370.  Where should I keep my medicine?  Keep out of the reach of children.  Store at room temperature between 15 and 30 degrees C (59 and 86 degrees F). Throw away any unused medicine after the expiration date.  NOTE: This sheet is a summary. It may not cover all possible information. If you have questions about this medicine, talk to your doctor, pharmacist, or health care provider.  © 2020 [employer] (2020-03-16 09:45:59)      · Is patient discharged on Warfarin / Coumadin?   No     Depression / Suicide Risk    As you are discharged from this [facility] facility, it is important to learn how to keep safe from harming yourself.    Recognize the warning signs:  · Abrupt changes in personality, positive or negative- including increase in energy   · Giving away possessions  · Change in eating patterns- significant weight changes-  positive or negative  · Change in sleeping patterns- unable to sleep or sleeping all the time   · Unwillingness or inability to communicate  · Depression  · Unusual sadness, discouragement and loneliness  · Talk of wanting to die  · Neglect of personal appearance   · Rebelliousness- reckless behavior  · Withdrawal from people/activities they love  · Confusion- inability to concentrate     If you or a loved one observes any of these behaviors or has concerns about self-harm, here's what you can do:  · Talk about it- your feelings and reasons for harming yourself  · Remove any means that you might use to hurt yourself (examples: pills, rope, extension cords, firearm)  · Get professional help from the community (Mental Health, Substance Abuse, psychological  counseling)  · Do not be alone:Call your Safe Contact- someone whom you trust who will be there for you.  · Call your local CRISIS HOTLINE 434-3113 or 377-975-5582  · Call your local Children's Mobile Crisis Response Team [place] (249) 840-5294 or www.Bueroservice24  · Call the toll free National Suicide Prevention Hotlines   · National Suicide Prevention Lifeline 143-830-NVJH (7777)  · National Hope Line Network 800-SUICIDE (624-1102)

## 2022-04-14 ENCOUNTER — VIRTUAL VISIT (OUTPATIENT)
Dept: INFECTIOUS DISEASES | Facility: CLINIC | Age: 22
End: 2022-04-14
Attending: INTERNAL MEDICINE
Payer: COMMERCIAL

## 2022-04-14 DIAGNOSIS — Z21 ASYMPTOMATIC HUMAN IMMUNODEFICIENCY VIRUS (HIV) INFECTION STATUS (H): Primary | ICD-10-CM

## 2022-04-14 PROCEDURE — 99214 OFFICE O/P EST MOD 30 MIN: CPT | Mod: 95 | Performed by: INTERNAL MEDICINE

## 2022-04-14 NOTE — LETTER
4/14/2022       RE: Aidan Henning  [address]  [place] [address]     Dear Colleague,    Thank you for referring your patient, Aidan Henning, to the [facility] INFECTIOUS DISEASE CLINIC [place] at [facility]. Please see a copy of my visit note below.    Aidan is a 21 year old who is being evaluated via a billable video visit.      How would you like to obtain your AVS? MyChart  If the video visit is dropped, the invitation should be resent by: Text to cell phone: [phone]  Will anyone else be joining your video visit? No    Video Start Time: 3PM  Video-Visit Details    Type of service:  Video Visit    Video End Time:3:15P    Originating Location (pt. Location): Home    Distant Location (provider location):  [facility] INFECTIOUS DISEASE [facility]     Platform used for Video Visit: [facility]  Infectious Disease Clinic Note     Date of Visit:  April 14, 2022  Patient:  Aidan Henning  Medical record number [id number]    History of Present Illness  Aidan Henning is a 21 year old male who returns for routine follow-up.  He has been doing well and has no complaints. He does want information about the injectables because he said he occasionally forgets to take his meds (he is only on Biktarvy).    Problem List  1. Asymptomatic human immunodeficiency virus (HIV) infection status (H)         Review of Systems  Twelve point review of systems is otherwise normal.    Current Medications  Current Outpatient Medications   Medication     bictegravir-emtricitabine-tenofovir (BIKTARVY) -25 MG per tablet     nicotine (COMMIT) 2 MG lozenge     No current facility-administered medications for this visit.       Family/Social History  No family history on file.      Recent Laboratory Values    Routine Labs  Hemoglobin   Date Value Ref Range Status   10/28/2021 14.1 13.3 - 17.7  g/dL Final   06/10/2021 14.7 13.3 - 17.7 g/dL Final     MCV   Date Value Ref Range Status   10/28/2021 94 78 - 100 fL Final   06/10/2021 89 78 - 100 fl Final     Platelet Count   Date Value Ref Range Status   10/28/2021 293 150 - 450 10e3/uL Final   06/10/2021 304 150 - 450 10e9/L Final     Creatinine   Date Value Ref Range Status   10/28/2021 1.00 0.66 - 1.25 mg/dL Final   06/10/2021 0.90 0.66 - 1.25 mg/dL Final     AST   Date Value Ref Range Status   10/28/2021 26 0 - 45 U/L Final   06/10/2021 27 0 - 45 U/L Final     ALT   Date Value Ref Range Status   10/28/2021 41 0 - 70 U/L Final   06/10/2021 38 0 - 70 U/L Final     Bilirubin Total   Date Value Ref Range Status   10/28/2021 0.4 0.2 - 1.3 mg/dL Final   06/10/2021 0.4 0.2 - 1.3 mg/dL Final       T Cell Subset:  CD3 Mature T   Date Value Ref Range Status   06/10/2021 81 49 - 84 % Final     CD3% Total T Cells   Date Value Ref Range Status   10/28/2021 72 49 - 84 % Final     CD4 [place] T   Date Value Ref Range Status   06/10/2021 20 (L) 28 - 63 % Final     CD4% [place] T Cells   Date Value Ref Range Status   10/28/2021 30 28 - 63 % Final     CD8 Suppressor T   Date Value Ref Range Status   06/10/2021 59 (H) 10 - 40 % Final     CD8% Suppressor T Cells   Date Value Ref Range Status   10/28/2021 40 10 - 40 % Final     CD4:CD8 Ratio   Date Value Ref Range Status   10/28/2021 0.75 (L) 1.40 - 2.60 Final   06/10/2021 0.34 (L) 1.40 - 2.60 Final     WBC   Date Value Ref Range Status   06/10/2021 4.5 4.0 - 11.0 10e9/L Final     WBC Count   Date Value Ref Range Status   10/28/2021 5.5 4.0 - 11.0 10e3/uL Final     % Lymphocytes   Date Value Ref Range Status   10/28/2021 46 % Final   06/10/2021 76.0 % Final     Absolute CD3   Date Value Ref Range Status   06/10/2021 2,497 603 - 2,990 cells/uL Final     Absolute CD3, Total T Cells   Date Value Ref Range Status   10/28/2021 1,945 603-2,990 cells/uL Final     Absolute CD4   Date Value Ref Range Status   06/10/2021 606 441 - 2,156  cells/uL Final     Absolute CD4, [place] T Cells   Date Value Ref Range Status   10/28/2021 808 441-2,156 cells/uL Final     Absolute CD8   Date Value Ref Range Status   06/10/2021 1,830 (H) 125 - 1,312 cells/uL Final     Absolute CD8, Suppressor T Cells   Date Value Ref Range Status   10/28/2021 1,082 125-1,312 cells/uL Final       HIV-1 RNA Quantitative:  HIV-1 RNA Quant Result   Date Value Ref Range Status   06/10/2021 6,123 (A) HIVND^HIV-1 RNA Not Detected [Copies]/mL Final     Comment:     The RYAN AmpliPrep/RYAN TaqMan HIV-1 test is an FDA-approved in vitro   nucleic acid amplification test for the quantitation of HIV-1 RNA in human   plasma (EDTA plasma) using the RYAN AmpliPrep instrument for automated viral   nucleic acid extraction and the RYAN TaqMan Analyzer or RYAN TaqMan for   automated Real Time PCR amplification and detection of the viral nucleic acid   target.  Titer results are reported in copies/ml. This assay is intended for use in   conjunction with clinical presentation and other laboratory markers of disease   prognosis and for use as an aid in assessing viral response to antiretroviral   treatment as measured by changes in plasma HIV-1 RNA levels. This test should   not be used as a donor screening test to confirm the presence of HIV-1   infection.       HIV-1 RNA copies/mL   Date Value Ref Range Status   10/28/2021 Not Detected Not Detected Copies/mL Final        Assessment and Plan  (Z21) Asymptomatic human immunodeficiency virus (HIV) infection status (H)  (primary encounter diagnosis)  Comment: Stable HIV. We reviewed his medication list and his most recent labs. We discussed the risks and benefits of cabotegravir. I encouraged him to contact our clinic pharmacist to discuss the logistics.  Plan: Comprehensive metabolic panel, CBC with         platelets differential, HIV-1 RNA quantitative,        T cell subset profile        Again, thank you for allowing me to participate in the care  of your patient.      Sincerely,    Andrew Flores MD

## 2022-04-14 NOTE — LETTER
Date:April 15, 2022      Patient was self referred, no letter generated. Do not send.        [facility] Health Information

## 2022-04-14 NOTE — PROGRESS NOTES
Aidan is a 21 year old who is being evaluated via a billable video visit.      How would you like to obtain your AVS? MyChart  If the video visit is dropped, the invitation should be resent by: Text to cell phone: [phone]  Will anyone else be joining your video visit? No    Video Start Time: 3PM  Video-Visit Details    Type of service:  Video Visit    Video End Time:3:15P    Originating Location (pt. Location): Home    Distant Location (provider location):  [facility] INFECTIOUS DISEASE CLINIC [place]     Platform used for Video Visit: [place]     [facility]  Infectious Disease Clinic Note     Date of Visit:  April 14, 2022  Patient:  Aidan Henning  Medical record number [id number]    History of Present Illness  Aidan Henning is a 21 year old male who returns for routine follow-up.  He has been doing well and has no complaints. He does want information about the injectables because he said he occasionally forgets to take his meds (he is only on Biktarvy).    Problem List  1. Asymptomatic human immunodeficiency virus (HIV) infection status (H)         Review of Systems  Twelve point review of systems is otherwise normal.    Current Medications  Current Outpatient Medications   Medication     bictegravir-emtricitabine-tenofovir (BIKTARVY) -25 MG per tablet     nicotine (COMMIT) 2 MG lozenge     No current facility-administered medications for this visit.       Family/Social History  No family history on file.      Recent Laboratory Values    Routine Labs  Hemoglobin   Date Value Ref Range Status   10/28/2021 14.1 13.3 - 17.7 g/dL Final   06/10/2021 14.7 13.3 - 17.7 g/dL Final     MCV   Date Value Ref Range Status   10/28/2021 94 78 - 100 fL Final   06/10/2021 89 78 - 100 fl Final     Platelet Count   Date Value Ref Range Status   10/28/2021 293 150 - 450 10e3/uL Final   06/10/2021 304 150 - 450 10e9/L Final     Creatinine   Date Value Ref Range Status    10/28/2021 1.00 0.66 - 1.25 mg/dL Final   06/10/2021 0.90 0.66 - 1.25 mg/dL Final     AST   Date Value Ref Range Status   10/28/2021 26 0 - 45 U/L Final   06/10/2021 27 0 - 45 U/L Final     ALT   Date Value Ref Range Status   10/28/2021 41 0 - 70 U/L Final   06/10/2021 38 0 - 70 U/L Final     Bilirubin Total   Date Value Ref Range Status   10/28/2021 0.4 0.2 - 1.3 mg/dL Final   06/10/2021 0.4 0.2 - 1.3 mg/dL Final       T Cell Subset:  CD3 Mature T   Date Value Ref Range Status   06/10/2021 81 49 - 84 % Final     CD3% Total T Cells   Date Value Ref Range Status   10/28/2021 72 49 - 84 % Final     CD4 [place] T   Date Value Ref Range Status   06/10/2021 20 (L) 28 - 63 % Final     CD4% [place] T Cells   Date Value Ref Range Status   10/28/2021 30 28 - 63 % Final     CD8 Suppressor T   Date Value Ref Range Status   06/10/2021 59 (H) 10 - 40 % Final     CD8% Suppressor T Cells   Date Value Ref Range Status   10/28/2021 40 10 - 40 % Final     CD4:CD8 Ratio   Date Value Ref Range Status   10/28/2021 0.75 (L) 1.40 - 2.60 Final   06/10/2021 0.34 (L) 1.40 - 2.60 Final     WBC   Date Value Ref Range Status   06/10/2021 4.5 4.0 - 11.0 10e9/L Final     WBC Count   Date Value Ref Range Status   10/28/2021 5.5 4.0 - 11.0 10e3/uL Final     % Lymphocytes   Date Value Ref Range Status   10/28/2021 46 % Final   06/10/2021 76.0 % Final     Absolute CD3   Date Value Ref Range Status   06/10/2021 2,497 603 - 2,990 cells/uL Final     Absolute CD3, Total T Cells   Date Value Ref Range Status   10/28/2021 1,945 603-2,990 cells/uL Final     Absolute CD4   Date Value Ref Range Status   06/10/2021 606 441 - 2,156 cells/uL Final     Absolute CD4, [place] T Cells   Date Value Ref Range Status   10/28/2021 808 441-2,156 cells/uL Final     Absolute CD8   Date Value Ref Range Status   06/10/2021 1,830 (H) 125 - 1,312 cells/uL Final     Absolute CD8, Suppressor T Cells   Date Value Ref Range Status   10/28/2021 1,082 125-1,312 cells/uL Final        HIV-1 RNA Quantitative:  HIV-1 RNA Quant Result   Date Value Ref Range Status   06/10/2021 6,123 (A) HIVND^HIV-1 RNA Not Detected [Copies]/mL Final     Comment:     The RYAN AmpliPrep/RYAN TaqMan HIV-1 test is an FDA-approved in vitro   nucleic acid amplification test for the quantitation of HIV-1 RNA in human   plasma (EDTA plasma) using the RYAN AmpliPrep instrument for automated viral   nucleic acid extraction and the RYAN TaqMan Analyzer or [employer] TaqMan for   automated Real Time PCR amplification and detection of the viral nucleic acid   target.  Titer results are reported in copies/ml. This assay is intended for use in   conjunction with clinical presentation and other laboratory markers of disease   prognosis and for use as an aid in assessing viral response to antiretroviral   treatment as measured by changes in plasma HIV-1 RNA levels. This test should   not be used as a donor screening test to confirm the presence of HIV-1   infection.       HIV-1 RNA copies/mL   Date Value Ref Range Status   10/28/2021 Not Detected Not Detected Copies/mL Final        Assessment and Plan  (Z21) Asymptomatic human immunodeficiency virus (HIV) infection status (H)  (primary encounter diagnosis)  Comment: Stable HIV. We reviewed his medication list and his most recent labs. We discussed the risks and benefits of cabotegravir. I encouraged him to contact our clinic pharmacist to discuss the logistics.  Plan: Comprehensive metabolic panel, CBC with         platelets differential, HIV-1 RNA quantitative,        T cell subset profile

## 2022-04-14 NOTE — PATIENT INSTRUCTIONS
Continue current medications  Encourage you to contact Caroline Lara to discuss switch to Cabotegravir.

## 2022-04-20 ENCOUNTER — VIRTUAL VISIT (OUTPATIENT)
Dept: PHARMACY | Facility: CLINIC | Age: 22
End: 2022-04-20
Payer: COMMERCIAL

## 2022-04-20 DIAGNOSIS — Z21 ASYMPTOMATIC HUMAN IMMUNODEFICIENCY VIRUS (HIV) INFECTION STATUS (H): Primary | ICD-10-CM

## 2022-04-20 DIAGNOSIS — Z72.0 TOBACCO ABUSE: ICD-10-CM

## 2022-04-20 PROCEDURE — 99606 MTMS BY PHARM EST 15 MIN: CPT | Performed by: PHARMACIST

## 2022-04-20 NOTE — PATIENT INSTRUCTIONS
Recommendations from today's MTM visit:                                                      1) Please update your lab work when you can.    2) Here is some basic information about the monthly/bi-monthly injection/      Cabenuva is a once-monthly injectable medication that replaces the pills that you take for HIV.    Before starting Cabenuva injections:  We must check with your insurance company to make sure they pay for the injections and enroll with the drug company.      Once you have been approved for the Cabenuva injections, you will take the pill version of the medication for 28-30 days to make sure that you do not have side effects to the medications.  The pill version will be mailed either to your house or the clinic, per your preference on the enrollment form.  Before you start the pills please contact me so we can set a date for you to start.  Note that you will be taking 2 pills once daily for 28-30 days.      Cabenuva Injections  You come in to clinic once per month for 2 injections - 1 in each gluteal muscle.    At this time injections are only being offered at the [facility] in [place].      If you are unable to come in for any reason, we will re-start oral medications when the next injection would be due.      Side Effects  - Most common: Injection site reaction, musculoskeletal pain, rash, nausea, fatigue, fever, headache  - Rare but serious: Allergic reaction, liver toxicity (we regularly monitor liver function)    Please feel free to contact me with any questions or concerns you have.  I can be reached at [phone].    Next MTM visit: as needed    I value your experience and would be very thankful for your time with providing feedback on our clinic survey. You may receive a survey via email or text message in the next few days.     Please feel free to contact me with any questions or concerns you have.  I can be reached at [phone].    Take care!  Chapis Lara, PharmD, BCACP

## 2022-04-20 NOTE — PROGRESS NOTES
Medication Therapy Management (MTM) Encounter    ASSESSMENT:                            Medication Adherence/Access: No issues identified    HIV: Diagnosed 6/2021 and started on therapy with Biktarvy at that time, labs on 10/28/2021 showed an absolute CD4 of 808 and an undetectable viral load; he would benefit from updating lab work.  He is an excellent candidate for Cabenuva once we are able to confirm he has had an undetectable viral load for at least 3 months, we discussed the protocol and process should he like to pursue this option.      Tobacco Use: Continues to vape and smoke 1-2 cigarettes per day, he is not     PLAN:                            - Update lab work  - We discussed Cabenuva including: the need for monthly injections following a 1 month oral lead-in period (pending insurance coverage) and possible side effects while acknowledging that the medication is generally very well tolerated. At this time he will think about if he wants to pursue Cabenuva.  - He will continue to work towards complete smoking cessation.    Follow-up: as needed    Chapis Lara, PharmD, BCACP    SUBJECTIVE/OBJECTIVE:                          Aidan Henning is a 21 year old male called for a follow-up visit.  Today's visit is a follow-up MTM visit from 3/23/2022.   Reason for visit: Discuss Cabenuva - he is interested in learning more about this as he thinks it sounds more convenient that daily oral pill.  Overall he has been doing well, his hand is healing well.  He has been looking in to new apartments.      Allergies/ADRs: None  Past Medical History: Reviewed in chart  Tobacco: He reports that he has been smoking.   Tobacco Cessation Action Plan:   Information offered: Patient not interested at this time  Alcohol: 1-3 beverages / week  Occupation: Working at [employer] 40 hrs/week and 1 day/week at [employer].      Medication Adherence/Access:   Patient takes medications directly from bottles.  Patient takes  medications 1 time(s) per day.   Per patient, misses medication 0 times per week, missed 1 or 2 times ever  Medication barriers: none.   The patient fills medications at [facility]: YES.    HIV dx 6/2021  Current medications:   Biktarvy - 1 tablet once daily - AM     Past medications: None     Mutations (reviewed 8/11/2021):   NNRTI: I178M  PI: E35D, L63T, A71V     HLA B 5701: negative     Tobacco Use  He continues to vape and smoke 1-2 cigarettes per day.  He did not like the taste of the nicotine lozenge and felt that nicotine gum was harsh on his throat.  He is not overwhelmingly interested in trying the nicotine patch or other therapies for smoking cessation but will let us know if this is something he would like to pursue.     Today's Vitals: There were no vitals taken for this visit.     BP Readings from Last 3 Encounters:   08/05/21 (!) 144/75      No results for input(s): CHOL, HDL, LDL, TRIG, CHOLHDLRATIO in the last 83112 hours.  No results found for: A1C   Sodium   Date Value Ref Range Status   10/28/2021 141 133 - 144 mmol/L Final   06/10/2021 143 133 - 144 mmol/L Final     Potassium   Date Value Ref Range Status   10/28/2021 3.8 3.4 - 5.3 mmol/L Final   06/10/2021 4.0 3.4 - 5.3 mmol/L Final     Chloride   Date Value Ref Range Status   10/28/2021 112 (H) 94 - 109 mmol/L Final   06/10/2021 112 (H) 94 - 109 mmol/L Final     Carbon Dioxide   Date Value Ref Range Status   06/10/2021 25 20 - 32 mmol/L Final     Carbon Dioxide (CO2)   Date Value Ref Range Status   10/28/2021 27 20 - 32 mmol/L Final     Anion Gap   Date Value Ref Range Status   10/28/2021 2 (L) 3 - 14 mmol/L Final   06/10/2021 6 3 - 14 mmol/L Final     Glucose   Date Value Ref Range Status   10/28/2021 119 (H) 70 - 99 mg/dL Final   06/10/2021 93 70 - 99 mg/dL Final     Urea Nitrogen   Date Value Ref Range Status   10/28/2021 10 7 - 30 mg/dL Final   06/10/2021 7 7 - 30 mg/dL Final     Creatinine   Date Value Ref Range Status   10/28/2021 1.00 0.66  - 1.25 mg/dL Final   06/10/2021 0.90 0.66 - 1.25 mg/dL Final     GFR Estimate   Date Value Ref Range Status   10/28/2021 >90 >60 mL/min/1.73m2 Final     Comment:     As of July 11, 2021, eGFR is calculated by the CKD-EPI creatinine equation, without race adjustment. eGFR can be influenced by muscle mass, exercise, and diet. The reported eGFR is an estimation only and is only applicable if the renal function is stable.   06/10/2021 >90 >60 mL/min/[1.73_m2] Final     Comment:     Non  GFR Calc  Starting 12/18/2018, serum creatinine based estimated GFR (eGFR) will be   calculated using the Chronic Kidney Disease Epidemiology Collaboration   (CKD-EPI) equation.       Calcium   Date Value Ref Range Status   10/28/2021 8.8 8.5 - 10.1 mg/dL Final   06/10/2021 9.1 8.5 - 10.1 mg/dL Final     Component      Latest Ref Rng & Units 10/28/2021   CD3 Mature T      49 - 84 % 72   CD4 [place] T      28 - 63 % 30   CD8 Suppressor T      10 - 40 % 40   CD4:CD8 Ratio      1.40 - 2.60 0.75 (L)   Absolute CD3      603-2,990 cells/uL 1,945   Absolute CD4      441-2,156 cells/uL 808   Absolute CD8      125-1,312 cells/uL 1,082   HIV-1 RNA Quant Result      Not Detected Copies/mL Not Detected   HIV RNA QT Log      <1.3 Log:copies/mL      Lab Results   Component Value Date    CR 1.00 10/28/2021    CR 0.90 06/10/2021      Recent Labs   Lab Test 10/28/21  1614   WBC 5.5   RBC 4.41   HGB 14.1   HCT 41.5   MCV 94   MCH 32.0   MCHC 34.0   RDW 11.9         Recent Labs   Lab Test 10/28/21  1614   PROTTOTAL 7.0   ALBUMIN 3.4   BILITOTAL 0.4   ALKPHOS 87   AST 26   ALT 41      ----------------    I spent 11 minutes with this patient today. All changes were made via collaborative practice agreement with Dr. Flores. A copy of the visit note was provided to the patient's provider(s).  The patient was sent via [employer] a summary of these recommendations.       Telemedicine Visit Details  Type of service:  Telephone visit  Start  Time: 10:24 AM  End Time: 10:35 AM  Originating Location (patient location): Home  Distant Location (provider location):  [facility] AND INFECTIOUS DISEASES MTM     Medication Therapy Recommendations  No medication therapy recommendations to display

## 2022-04-22 ENCOUNTER — TELEPHONE (OUTPATIENT)
Dept: PHARMACY | Facility: CLINIC | Age: 22
End: 2022-04-22
Payer: COMMERCIAL

## 2022-04-22 NOTE — TELEPHONE ENCOUNTER
Attempted to contact the patient to for refill reminder call,  left message on voicemail    Last filled on: 03/21/22    Follow-up Date: 04/28/22 2nd attempt    Yuki Bright CPhT  [facility]  [phone]

## 2022-05-08 ENCOUNTER — NURSE TRIAGE (OUTPATIENT)
Dept: NURSING | Facility: CLINIC | Age: 22
End: 2022-05-08
Payer: COMMERCIAL

## 2022-05-08 NOTE — TELEPHONE ENCOUNTER
"Pt calling that he was exposed to influenza A, pt has had congestion and sore throat for the past two days.  Pt intends to go to Urgent Care today.  Vanessa Fermin RN  [facility]NA Nurse Advisor    Reason for Disposition    [1] Sore throat is the only symptom AND [2] present > 48 hours    Additional Information    Negative: Severe difficulty breathing (e.g., struggling for each breath, speaks in single words, stridor)    Negative: Sounds like a life-threatening emergency to the triager    Negative: [1] Diagnosed strep throat AND [2] taking antibiotic AND [3] symptoms continue    Negative: Throat culture results, call about    Negative: Productive cough is main symptom    Negative: Non-productive cough is main symptom    Negative: Hoarseness is main symptom    Negative: Runny nose is main symptom    Negative: [1] Drooling or spitting out saliva (because can't swallow) AND [2] normal breathing    Negative: Unable to open mouth completely    Negative: [1] Difficulty breathing AND [2] not severe    Negative: Fever > 104 F (40 C)    Negative: [1] Refuses to drink anything AND [2] for > 12 hours    Negative: [1] Drinking very little AND [2] dehydration suspected (e.g., no urine > 12 hours, very dry mouth, very lightheaded)    Negative: Patient sounds very sick or weak to the triager    Negative: SEVERE (e.g., excruciating) throat pain    Negative: [1] Pus on tonsils (back of throat) AND [2]  fever AND [3] swollen neck lymph nodes (\"glands\")    Negative: [1] Rash AND [2] widespread (especially chest and abdomen)    Negative: Earache also present    Negative: Fever present > 3 days (72 hours)    Negative: Diabetes mellitus or weak immune system (e.g., HIV positive, cancer chemo, splenectomy, organ transplant, chronic steroids)    Negative: History of rheumatic fever    Negative: [1] Adult is leaving on a trip AND [2] requests an antibiotic NOW    Negative: [1] Positive throat culture or rapid strep test (according to lab, PCP, " caller, etc.) AND [2] NO  standing order to call in prescription for antibiotic    Negative: [1] Exposure to family member (or spouse or boyfriend/girlfriend) with test-proven strep AND [2] within last 10 days    Protocols used: SORE THROAT-A-AH

## 2022-05-26 ENCOUNTER — TELEPHONE (OUTPATIENT)
Dept: PHARMACY | Facility: CLINIC | Age: 22
End: 2022-05-26
Payer: COMMERCIAL

## 2022-05-26 NOTE — TELEPHONE ENCOUNTER
Attempted to contact the patient to for refill reminder call,  no ability to leave a message, will mail letter need updated contact information    Last filled on: 04/26/22    Follow-up Date: 06/01/22 2nd attempt    Yuki Bright [facility]  [phone]

## 2022-06-03 NOTE — TELEPHONE ENCOUNTER
Attempted to contact the patient to for refill reminder call,  no ability to leave a message, will mail letter need updated contact information    Last filled on: 04/26/22    Follow-up Date: 06/08/22 3rd attempt    [facility]  [phone]

## 2022-06-09 NOTE — TELEPHONE ENCOUNTER
Attempted to contact the patient to for refill reminder call,  no ability to leave a message,  Will send relay text.     Last filled on: 04/26/22    Follow-up Date: 06/16/22 4th attempt    Yuki Bright CPhT  [facility]  [phone]

## 2022-06-16 NOTE — TELEPHONE ENCOUNTER
Attempted to contact the patient to for refill reminder call,  no ability to leave a message, will mail letter need updated contact information    Last filled on: 04/26/22    Follow-up Date: 06/23/22 5th attempt    Yuki Bright [facility]  [phone]

## 2022-06-23 NOTE — TELEPHONE ENCOUNTER
Attempted to contact the patient to for refill reminder call,  no ability to leave a message, will mail letter need updated contact information    Last filled on: 04/26/22    Follow-up Date: 07/05/22 6th attempt    Yuki Bright [facility]  [phone]

## 2022-07-06 NOTE — TELEPHONE ENCOUNTER
Attempted to contact the patient to for refill reminder call,  left message on voicemail    Last filled on: 04/26/22    Follow-up Date: 07/15/22 7th attempt    Yuki Bright CPhT  [facility]  [phone]

## 2022-07-15 NOTE — TELEPHONE ENCOUNTER
Attempted to contact the patient to for refill reminder call,  no ability to leave a message, will mail letter need updated contact information. I will be putting this pt on hold until the clinic can re establish contact with patient.     Last filled on: 04/26/22    Follow-up Date: SELWYN Bright CPhT  [facility]  [phone]

## 2022-08-01 DIAGNOSIS — Z21 ASYMPTOMATIC HUMAN IMMUNODEFICIENCY VIRUS (HIV) INFECTION STATUS (H): ICD-10-CM

## 2022-08-25 ENCOUNTER — TELEPHONE (OUTPATIENT)
Dept: INFECTIOUS DISEASES | Facility: CLINIC | Age: 22
End: 2022-08-25

## 2022-10-19 ENCOUNTER — TELEPHONE (OUTPATIENT)
Dept: INFECTIOUS DISEASES | Facility: CLINIC | Age: 22
End: 2022-10-19

## 2022-10-20 DIAGNOSIS — Z21 ASYMPTOMATIC HUMAN IMMUNODEFICIENCY VIRUS (HIV) INFECTION STATUS (H): ICD-10-CM

## 2022-10-20 RX ORDER — BICTEGRAVIR SODIUM, EMTRICITABINE, AND TENOFOVIR ALAFENAMIDE FUMARATE 50; 200; 25 MG/1; MG/1; MG/1
1 TABLET ORAL DAILY
Qty: 30 TABLET | Refills: 0 | Status: SHIPPED | OUTPATIENT
Start: 2022-10-20 | End: 2022-11-02

## 2022-10-27 ENCOUNTER — TELEPHONE (OUTPATIENT)
Dept: PHARMACY | Facility: CLINIC | Age: 22
End: 2022-10-27

## 2022-10-27 ENCOUNTER — MYC MEDICAL ADVICE (OUTPATIENT)
Dept: PHARMACY | Facility: CLINIC | Age: 22
End: 2022-10-27

## 2022-10-27 NOTE — TELEPHONE ENCOUNTER
Biktarvy was filled for this patient on 10/20/22. We will be returning it to stock.     Last Refill 8/15/22.      Yuki Bright CPhT  [facility]  [phone]

## 2022-10-31 ENCOUNTER — LAB (OUTPATIENT)
Dept: LAB | Facility: CLINIC | Age: 22
End: 2022-10-31
Payer: COMMERCIAL

## 2022-10-31 ENCOUNTER — ALLIED HEALTH/NURSE VISIT (OUTPATIENT)
Dept: INFECTIOUS DISEASES | Facility: CLINIC | Age: 22
End: 2022-10-31
Attending: INTERNAL MEDICINE
Payer: COMMERCIAL

## 2022-10-31 DIAGNOSIS — Z23 NEED FOR VACCINATION: Primary | ICD-10-CM

## 2022-10-31 DIAGNOSIS — Z21 ASYMPTOMATIC HUMAN IMMUNODEFICIENCY VIRUS (HIV) INFECTION STATUS (H): ICD-10-CM

## 2022-10-31 DIAGNOSIS — Z11.3 ROUTINE SCREENING FOR STI (SEXUALLY TRANSMITTED INFECTION): Primary | ICD-10-CM

## 2022-10-31 DIAGNOSIS — Z11.3 ROUTINE SCREENING FOR STI (SEXUALLY TRANSMITTED INFECTION): ICD-10-CM

## 2022-10-31 LAB
ALBUMIN SERPL BCG-MCNC: 4.2 G/DL (ref 3.5–5.2)
ALP SERPL-CCNC: 103 U/L (ref 40–129)
ALT SERPL W P-5'-P-CCNC: 29 U/L (ref 10–50)
ANION GAP SERPL CALCULATED.3IONS-SCNC: 11 MMOL/L (ref 7–15)
AST SERPL W P-5'-P-CCNC: 28 U/L (ref 10–50)
BASOPHILS # BLD AUTO: 0 10E3/UL (ref 0–0.2)
BASOPHILS NFR BLD AUTO: 1 %
BILIRUB SERPL-MCNC: 0.3 MG/DL
BUN SERPL-MCNC: 7.3 MG/DL (ref 6–20)
CALCIUM SERPL-MCNC: 9.8 MG/DL (ref 8.6–10)
CHLORIDE SERPL-SCNC: 105 MMOL/L (ref 98–107)
CREAT SERPL-MCNC: 0.98 MG/DL (ref 0.67–1.17)
DEPRECATED HCO3 PLAS-SCNC: 25 MMOL/L (ref 22–29)
EOSINOPHIL # BLD AUTO: 0 10E3/UL (ref 0–0.7)
EOSINOPHIL NFR BLD AUTO: 1 %
ERYTHROCYTE [DISTWIDTH] IN BLOOD BY AUTOMATED COUNT: 11.4 % (ref 10–15)
GFR SERPL CREATININE-BSD FRML MDRD: >90 ML/MIN/1.73M2
GLUCOSE SERPL-MCNC: 102 MG/DL (ref 70–99)
HCT VFR BLD AUTO: 43.3 % (ref 40–53)
HGB BLD-MCNC: 14.7 G/DL (ref 13.3–17.7)
IMM GRANULOCYTES # BLD: 0 10E3/UL
IMM GRANULOCYTES NFR BLD: 0 %
LYMPHOCYTES # BLD AUTO: 2.8 10E3/UL (ref 0.8–5.3)
LYMPHOCYTES NFR BLD AUTO: 46 %
MCH RBC QN AUTO: 31.1 PG (ref 26.5–33)
MCHC RBC AUTO-ENTMCNC: 33.9 G/DL (ref 31.5–36.5)
MCV RBC AUTO: 92 FL (ref 78–100)
MONOCYTES # BLD AUTO: 0.5 10E3/UL (ref 0–1.3)
MONOCYTES NFR BLD AUTO: 9 %
NEUTROPHILS # BLD AUTO: 2.5 10E3/UL (ref 1.6–8.3)
NEUTROPHILS NFR BLD AUTO: 43 %
NRBC # BLD AUTO: 0 10E3/UL
NRBC BLD AUTO-RTO: 0 /100
PLATELET # BLD AUTO: 341 10E3/UL (ref 150–450)
POTASSIUM SERPL-SCNC: 4.1 MMOL/L (ref 3.4–5.3)
PROT SERPL-MCNC: 7.4 G/DL (ref 6.4–8.3)
RBC # BLD AUTO: 4.72 10E6/UL (ref 4.4–5.9)
SODIUM SERPL-SCNC: 141 MMOL/L (ref 136–145)
WBC # BLD AUTO: 5.8 10E3/UL (ref 4–11)

## 2022-10-31 PROCEDURE — 99000 SPECIMEN HANDLING OFFICE-LAB: CPT | Performed by: PATHOLOGY

## 2022-10-31 PROCEDURE — 87591 N.GONORRHOEAE DNA AMP PROB: CPT

## 2022-10-31 PROCEDURE — 90471 IMMUNIZATION ADMIN: CPT | Performed by: INTERNAL MEDICINE

## 2022-10-31 PROCEDURE — 91312 HC RX IP 250 OP 636: CPT | Performed by: INTERNAL MEDICINE

## 2022-10-31 PROCEDURE — 86592 SYPHILIS TEST NON-TREP QUAL: CPT | Mod: 90 | Performed by: PATHOLOGY

## 2022-10-31 PROCEDURE — 86359 T CELLS TOTAL COUNT: CPT | Mod: 90 | Performed by: PATHOLOGY

## 2022-10-31 PROCEDURE — 87900 PHENOTYPE INFECT AGENT DRUG: CPT | Mod: 90 | Performed by: PATHOLOGY

## 2022-10-31 PROCEDURE — 80053 COMPREHEN METABOLIC PANEL: CPT | Performed by: PATHOLOGY

## 2022-10-31 PROCEDURE — 36415 COLL VENOUS BLD VENIPUNCTURE: CPT | Performed by: PATHOLOGY

## 2022-10-31 PROCEDURE — 86360 T CELL ABSOLUTE COUNT/RATIO: CPT | Mod: 90 | Performed by: PATHOLOGY

## 2022-10-31 PROCEDURE — 86593 SYPHILIS TEST NON-TREP QUANT: CPT | Mod: 90 | Performed by: PATHOLOGY

## 2022-10-31 PROCEDURE — 87491 CHLMYD TRACH DNA AMP PROBE: CPT

## 2022-10-31 PROCEDURE — 0124A HC ADMIN COVID VAC PFIZER 12+ BIVAL ADDITIONAL: CPT | Performed by: INTERNAL MEDICINE

## 2022-10-31 PROCEDURE — 86780 TREPONEMA PALLIDUM: CPT | Mod: 90 | Performed by: PATHOLOGY

## 2022-10-31 PROCEDURE — 87903 PHENOTYPE DNA HIV W/CULTURE: CPT | Mod: 90 | Performed by: PATHOLOGY

## 2022-10-31 PROCEDURE — 85025 COMPLETE CBC W/AUTO DIFF WBC: CPT | Performed by: PATHOLOGY

## 2022-10-31 PROCEDURE — 250N000011 HC RX IP 250 OP 636: Performed by: INTERNAL MEDICINE

## 2022-10-31 PROCEDURE — 87536 HIV-1 QUANT&REVRSE TRNSCRPJ: CPT | Mod: 90 | Performed by: PATHOLOGY

## 2022-10-31 PROCEDURE — 87904 PHENOTYPE DNA HIV W/CLT ADD: CPT | Mod: 90 | Performed by: PATHOLOGY

## 2022-10-31 PROCEDURE — 90611 SMALLPOX&MONKEYPOX VAC 0.5ML: CPT | Performed by: INTERNAL MEDICINE

## 2022-10-31 PROCEDURE — 87901 NFCT AGT GNTYP ALYS HIV1 REV: CPT | Mod: 90 | Performed by: PATHOLOGY

## 2022-10-31 RX ADMIN — BNT162B2 ORIGINAL AND OMICRON BA.4/BA.5 30 MCG: .1125; .1125 INJECTION, SUSPENSION INTRAMUSCULAR at 14:32

## 2022-10-31 RX ADMIN — RABIES VACCINE 0.1 ML: KIT at 14:31

## 2022-10-31 NOTE — PROGRESS NOTES
"Patient presented to clinic today for STI testing and Jynneos vaccine. Patient requesting 60 or 90 day refill of medication as he will be travelling for the next \"couple\" months. Patient states he has no medication left and ran out over a month ago. Prior to that, patient was taking meds inconsistently and skipping doses to \"stretch them out\". Prior to that period, patient took Biktarvy intermittently. Advised patient that prior to any further refills, he needs labs completed and he needs to see an in person provider for education on resistance.   "

## 2022-10-31 NOTE — PROGRESS NOTES
Per [facility] Protocol, routine B20 lab orders entered as pt is due for them in order to monitor pt's disease state.      Pt is coming into clinic today needs STI testing. Per [facility] Protocol, STI orders entered for pt.      Tarun Hurd RN  Infectious Disease 9:47 AM 10/31/22

## 2022-11-01 LAB
C TRACH DNA SPEC QL NAA+PROBE: NEGATIVE
C TRACH DNA SPEC QL NAA+PROBE: NEGATIVE
C TRACH DNA SPEC QL NAA+PROBE: POSITIVE
CD3 CELLS # BLD: 1861 CELLS/UL (ref 603–2990)
CD3 CELLS NFR BLD: 66 % (ref 49–84)
CD3+CD4+ CELLS # BLD: 699 CELLS/UL (ref 441–2156)
CD3+CD4+ CELLS NFR BLD: 25 % (ref 28–63)
CD3+CD4+ CELLS/CD3+CD8+ CLL BLD: 0.63 % (ref 1.4–2.6)
CD3+CD8+ CELLS # BLD: 1106 CELLS/UL (ref 125–1312)
CD3+CD8+ CELLS NFR BLD: 39 % (ref 10–40)
HIV1 RNA # PLAS NAA DL=20: 741 COPIES/ML
HIV1 RNA SERPL NAA+PROBE-LOG#: 2.9 {LOG_COPIES}/ML
N GONORRHOEA DNA SPEC QL NAA+PROBE: NEGATIVE
N GONORRHOEA DNA SPEC QL NAA+PROBE: POSITIVE
N GONORRHOEA DNA SPEC QL NAA+PROBE: POSITIVE
T CELL COMMENT: ABNORMAL
T PALLIDUM AB SER QL: REACTIVE

## 2022-11-02 DIAGNOSIS — Z21 ASYMPTOMATIC HUMAN IMMUNODEFICIENCY VIRUS (HIV) INFECTION STATUS (H): ICD-10-CM

## 2022-11-02 LAB
RPR SER QL: REACTIVE
RPR SER-TITR: ABNORMAL {TITER}

## 2022-11-02 RX ORDER — BICTEGRAVIR SODIUM, EMTRICITABINE, AND TENOFOVIR ALAFENAMIDE FUMARATE 50; 200; 25 MG/1; MG/1; MG/1
1 TABLET ORAL DAILY
Qty: 90 TABLET | Refills: 1 | Status: SHIPPED | OUTPATIENT
Start: 2022-11-02 | End: 2022-12-07

## 2022-11-02 NOTE — PROGRESS NOTES
Case report sent to MD for lab positive Chlamydia and Gonorrhea collected on 10/31/22.    Benita Caldwell  Infection Prevention

## 2022-11-21 ENCOUNTER — HEALTH MAINTENANCE LETTER (OUTPATIENT)
Age: 22
End: 2022-11-21

## 2022-12-02 ENCOUNTER — TELEPHONE (OUTPATIENT)
Dept: INFECTIOUS DISEASES | Facility: CLINIC | Age: 22
End: 2022-12-02

## 2022-12-02 NOTE — TELEPHONE ENCOUNTER
Called [facility] to discuss Syphilis Titer and if patient needs treatment.     Per [facility] patient has had sufficient treatment and titer is stabilized enough to not need further treatment.     Called patient to cancel nurse only appt and make sure he is scheduled with a provider next week to review labs and genotype to discuss medications. Patient is scheduled with Dr. Hoover.     Patient needs tx for gonnerhea at appt on 12/7.    Tarun Hurd RN  Infectious Disease 10:35 AM 12/02/22

## 2022-12-06 ENCOUNTER — TELEPHONE (OUTPATIENT)
Dept: INFECTIOUS DISEASES | Facility: CLINIC | Age: 22
End: 2022-12-06

## 2022-12-06 DIAGNOSIS — A54.9 GONORRHEA: ICD-10-CM

## 2022-12-06 RX ORDER — AZITHROMYCIN 500 MG/1
1000 TABLET, FILM COATED ORAL ONCE
Status: CANCELLED
Start: 2022-12-06 | End: 2022-12-06

## 2022-12-06 RX ORDER — LIDOCAINE HYDROCHLORIDE 10 MG/ML
1 INJECTION, SOLUTION EPIDURAL; INFILTRATION; INTRACAUDAL; PERINEURAL ONCE
Status: CANCELLED
Start: 2022-12-06 | End: 2022-12-06

## 2022-12-06 NOTE — PROGRESS NOTES
[facility]    Division of Infectious Diseases and International Medicine    HIV OUTPATIENT VISIT NOTE   Patient:  Aidan Henning, Date of birth 2000, Medical record number [id number]       Staff Attestation    I saw Aidan Henning in the clinic on Dec 7, 2022, with the fellow Dr. Colin. I have examined the patient and reviewed the history, vital signs, medications, labs and imaging. Assessment and plan were jointly made. I agree with and have edited the note and plan of care.     Queenie Keane   , [facility]  Pager - [phone]           Assessment and Plan     # HIV  - ART Continue Same Biktarvy  - Labs with Viral load in 1 month- needs quantiferon  - RTC in 4-6 weeks  - Medication Adherence discussed  - Opportunistic Infection prophylaxis Not due today based on CD-4 levels    #STI  - Gonococcal Treatment Ceftriaxone 500mg IM x 1  - Chlamydia Doxycycline 100mg BID x 7 days with test of cure re treat x 21 days if still positive  - RPR Stable  - Safe sex discussion, need for notification of partners, discussed [facility].  - RTC for test of cure in 4-6 weeks    # Preventative Medicine  Immunizations:- Flu, HepA/B,Pneumococal, , HPV, Meningococcus all due- to discuss in more depth at upcoming visit    COVID-19: up to date    Influenza: Recommended Seasonal Vaccine    HPV: due- will adress at later visit    Hepatitis A: due    Hepatitis B:  due    Tdap: up to date    PCV13->23 due    Meningococcus: due    Cardiovascular Hayden:                        Lipids: discuss at next visit    Obesity: to discuss next visit               Renal Health: Monitor   Creatinine   Date Value Ref Range Status   10/31/2022 0.98 0.67 - 1.17 mg/dL Final   06/10/2021 0.90 0.66 - 1.25 mg/dL Final        Other       HPI/Subjective     Aidan Henning is a wonderful 22 year old year old patient who follows with us for HIV. Last seen 4/14/22 by  Dr. Flores     Started on therapy 08/2021 after testing positive in 03/2021. He has h/o syphilis multiple times in the past. Was treated for late latent syphilis with three weekly shots of benzathine PCN around the time of starting ART. Achieved undetectable HIV viral load several months after starting ART . Lost to follow up and was somewhat inconsistent taking medications for about a month in August/September 2022 - Took every other day. Last HIV  10/31/22 and CD4 ~ 700.    He Had concerns that he may have been exposed to STD and came in for STD check 10/31/22. Was found to be positive for gonorrhea and chlamydia  In throat and rectum and RPR of titre of 2 indicating response to prior treatment.  He spent some time out of state and was unable to return for treatment until today.  He was asymptomatic at that time and continues to be asymptomatic.    While out of state he did have 1 ER visit 11/19/22 for left sided abdominal pain without diarrhea or rectal pain or tenesmus.  He attributed this to his medication [Biktarvy] but was not taking it consistently at that time nor had he had issues with the symptoms before.  The symptoms resolved spontaneously he did not receive any treatment in the emergency room.  He has been completely asymptomatic for over a month now.  He has returned back to [place] and is looking for treatment.    He is currently monogamous with a sexual partner and is exposed rectally and orally during sexual activities.          HIV Labs and History:     HIV    Diagnosed 6/10/2021- Viral load 6k CD4 600s   Started on Biktarvy  08/2021    After 4 months of treatment- Viral load 0 CD4 808 (12/22/21)    Suboptimal adherence summer 2022- Viral load 741, CD4 699 10/31/22  ----------  Labs  Toxo IgG-ve, [id number] -ve  Quantiferon not done    Prior OI  None    Co-morbid Infections  Hep B: low titre as as of 6/10/21 - HbsAb  Hep C: No  TB Screening:not due today    Other STD  Syphilis: Previously  treated   Gonococcal and Chlamydia in 10/2022    LABS     Latest Reference Range & Units 06/10/21 12:55 12/22/21 15:00 10/31/22 15:14 12/28/22 15:45   Rapid Plasma Reagin Titer Non Reactive  32 ! 1:8 (H) 1:2 (H) 1:2 (H)       Absolute CD4   Date Value Ref Range Status   06/10/2021 606 441 - 2,156 cells/uL Final     Absolute CD4, [place] T Cells   Date Value Ref Range Status   10/31/2022 699 441 - 2,156 cells/uL Final     HIV-1 RNA Quant Result   Date Value Ref Range Status   06/10/2021 6,123 (A) HIVND^HIV-1 RNA Not Detected [Copies]/mL Final     Comment:     The RYAN AmpliPrep/RYAN TaqMan HIV-1 test is an FDA-approved in vitro   nucleic acid amplification test for the quantitation of HIV-1 RNA in human   plasma (EDTA plasma) using the RYAN AmpliPrep instrument for automated viral   nucleic acid extraction and the [employer] TaqMan Analyzer or [employer] TaqMan for   automated Real Time PCR amplification and detection of the viral nucleic acid   target.  Titer results are reported in copies/ml. This assay is intended for use in   conjunction with clinical presentation and other laboratory markers of disease   prognosis and for use as an aid in assessing viral response to antiretroviral   treatment as measured by changes in plasma HIV-1 RNA levels. This test should   not be used as a donor screening test to confirm the presence of HIV-1   infection.       HIV-1 RNA copies/mL   Date Value Ref Range Status   10/28/2021 Not Detected Not Detected Copies/mL Final     Treponema Antibodies   Date Value Ref Range Status   06/10/2021 Reactive (A) NR^Nonreactive Final     Comment:     The Anti-Treponema Antibody screening tends to remain positive for life,   therefore it does not distinguish between active and past syphilis infections.   All positive and equivocal results will automatically reflex to a   non-specific Rapid Plasma Reagin (RPR) test.  If the Treponema Antibody is positive, and the RPR is positive, this is   presumptive  evidence of current infection, inadequately treated infection,   persistent infection or reinfection.  If the Anti-Treponema Antibody is positive and the RPR is negative, then a   second Treponema specific test (TP-PA) will be performed to determine whether   the antibody test is falsely positive or is detecting early infection.  If the   latter is suspected, repeat testing in approximately two weeks is   recommended.  Methodology Change: Test performed on the [employer] Liaison XL by Treponema   pallidum Total Antibodies Assay as of 3.17.2020.       Treponema Antibody Total   Date Value Ref Range Status   10/31/2022 Reactive (A) Nonreactive Final     Comment:     The Anti-Treponema Antibody screening tends to remain positive for life, therefore it does not distinguish between active and past syphilis infections. All positive and equivocal results will automatically reflex to a non-specific Rapid Plasma Reagin (RPR) test.    If the Treponema Antibody is positive, and the RPR is positive, this is presumptive evidence of current infection, inadequately treated infection, persistent infection or reinfection.    If the Anti-Treponema Antibody is positive and the RPR is negative, then a second Treponema specific test (TP-PA) will be performed to determine whether the antibody test is falsely positive or is detecting early in fection.  If the latter is suspected, repeat testing in approximately two weeks is recommended.     Hep B Surface Agn   Date Value Ref Range Status   06/10/2021 Nonreactive NR^Nonreactive Final           Review of Systems:     The following systems were reviewed with the patient as they pertain to the case and are negative unless noted here or above in the HPI. The patient was  able to participate in the following review of systems    REVIEW OF SYSTEMS:     Constitutional: No fevers, no chills, no sweats  Cardiac: No history of chest pain, No palpitations  Respiratory: No shortness of breath, no  wheeze, no cough  Gastro Intestinal: No history of abdominal pain, no vomiting, no diarrhea  Neurological: No headaches, no new or changing weakness, no new or changingnumbness  Musculoskeletal: No joint pain or swelling HEENT: No congestion No stridor  Psychiatric: No reported hallucinations, No obvious delusions  Allergic: No Hives No Rash  Haematologic: No Easy Bruising, No Nosebleeds  Genitourinary: No Dysuria, No Frequency  Endocrine: No Polyuria, No Polydipsia  Skin/Integumentary: No Rash, No Ulcer  Opthalmologic: No Diplopia, No Flashes        Other Medical History:     Attempt was made to collect past, family and social history during this encounter,  this information was reviewed with the patient and updated    Allergies:  Patient has no known allergies.    Past Medical History  HIV  BMI eleavted PastSurgical History  None   Family History  No family history on file. Social History  He reports that he has been smoking. He has never used smokeless tobacco. He reports current alcohol use. He reports current drug use. Drug: Marijuana.   Notable Exposures  MSM Vaccination History:  Immunization History   Administered Date(s) Administered     COVID-19 Vaccine 18+ (Moderna) 08/11/2021, 12/22/2021     COVID-19 Vaccine Bivalent Booster 12+ (Pfizer) 10/31/2022     Comvax (HIB/HepB) 10/01/2001     DTAP (<7y) 1998, 10/01/2001, 03/13/2003, 12/03/2003     Hep B, Peds or Adolescent 2000, 2000     Historic Hib Prohibit 2000     MMR 10/01/2001     Pneumococcal (PCV 7) 10/01/2001     Poliovirus, inactivated (IPV) 1998, 10/01/2001, 12/03/2003     Smallpox/Mpox Vaccine Subcutaneous (JYNNEOS) 10/31/2022     Tdap (Adacel,Boostrix) 11/02/2011, 03/19/2022     Varicella 11/02/2011             Physical Exam:     VITAL SIGNS:  Blood pressure 128/81, pulse 84, weight 103.9 kg (229 lb), SpO2 98 %.     PHYSICAL EXAM:  Eyes:     no ptosis, no discharge, no scleral icterus  Mouth, Throat:     mucous  membranes moist, pharynx normal without lesions  Cardiovascular:    Inspection: No Cyanosis, JVD not elevated   Auscultation:  S1, S2 normal, regular rate and rhythm  Respiratory:     Inspection: Not in respiratory distress, Chest expansion symmetrical   Auscultation: 4 point auscultation done clear to auscultation bilaterally, no wheezes, no rales, and no rhonchi  Gastrointestinal:      soft, non-tender; bowel sounds normal; no masses,  no organomegaly  Musculoskeletal:     no elbow wrist knee or ankle tenderness, deformity or swelling, no quadriceps calf or upper arm muscular tenderness noted  Skin:     Exposed skin is dry without rash or ulcer  Neurologic:     Higher Mental Function: Conversant, AOx4   Motor: Ambulatory   Sensory: crude touch intact in all 4 limbs  Psychiatric:     appropriate        Signature:     Gerson Colin MD   PGY-V Infectious Disease Fellow    This dictation was prepared in part using Dragon recognition software.  As a result errors may occur. When identified these transcription errors have been corrected.  While every attempt is made to correct errors during dictation, errors may still exist

## 2022-12-06 NOTE — TELEPHONE ENCOUNTER
Lab on 10/31/2022   Component Date Value Ref Range Status     Treponema Antibody Total 10/31/2022 Reactive (A)  Nonreactive Final    The Anti-Treponema Antibody screening tends to remain positive for life, therefore it does not distinguish between active and past syphilis infections. All positive and equivocal results will automatically reflex to a non-specific Rapid Plasma Reagin (RPR) test.    If the Treponema Antibody is positive, and the RPR is positive, this is presumptive evidence of current infection, inadequately treated infection, persistent infection or reinfection.    If the Anti-Treponema Antibody is positive and the RPR is negative, then a second Treponema specific test (TP-PA) will be performed to determine whether the antibody test is falsely positive or is detecting early in fection.  If the latter is suspected, repeat testing in approximately two weeks is recommended.     Rapid Plasma Reagin 10/31/2022 Reactive (A)  Nonreactive Final     Rapid Plasma Reagin Titer 10/31/2022 1:2 (H)  Non Reactive Final   Allied Health/Nurse Visit on 10/31/2022   Component Date Value Ref Range Status     Chlamydia trachomatis 10/31/2022 Negative  Negative Final    A negative result by transcription mediated amplification does not preclude the presence of C. trachomatis infection because results are dependent on proper and adequate collection, absence of inhibitors and sufficient rRNA to be detected.     Chlamydia trachomatis 10/31/2022 Positive (A)  Negative Final    Positive for C. trachomatis rRNA by transcription mediated amplification.  As is true for all non-culture methods, a positive specimen obtained from a patient after therapeutic treatment cannot be interpreted as indicating the presence of viable organism.     Chlamydia trachomatis 10/31/2022 Negative  Negative Final    A negative result by transcription mediated amplification does not preclude the presence of C. trachomatis infection because results are  dependent on proper and adequate collection, absence of inhibitors and sufficient rRNA to be detected.     Neisseria gonorrhoeae 10/31/2022 Negative  Negative Final    Negative for N. gonorrhoeae rRNA by transcription mediated amplification. A negative result by transcription mediated amplification does not preclude the presence of C. trachomatis infection because results are dependent on proper and adequate collection, absence of inhibitors and sufficient rRNA to be detected.     Neisseria gonorrhoeae 10/31/2022 Positive (A)  Negative Final    Positive for N. gonorrhoeae rRNA by transcription mediated amplification. As is true for all non-culture methods, a positive specimen obtained from a patient after therapeutic treatment cannot be interpreted as indicating the presence of viable N. gonorrhoeae.     Neisseria gonorrhoeae 10/31/2022 Positive (A)  Negative Final    Positive for N. gonorrhoeae rRNA by transcription mediated amplification. As is true for all non-culture methods, a positive specimen obtained from a patient after therapeutic treatment cannot be interpreted as indicating the presence of viable N. gonorrhoeae.

## 2022-12-07 ENCOUNTER — OFFICE VISIT (OUTPATIENT)
Dept: INFECTIOUS DISEASES | Facility: CLINIC | Age: 22
End: 2022-12-07
Attending: INTERNAL MEDICINE
Payer: COMMERCIAL

## 2022-12-07 VITALS
OXYGEN SATURATION: 98 % | WEIGHT: 229 LBS | BODY MASS INDEX: 39.31 KG/M2 | HEART RATE: 84 BPM | DIASTOLIC BLOOD PRESSURE: 81 MMHG | SYSTOLIC BLOOD PRESSURE: 128 MMHG

## 2022-12-07 DIAGNOSIS — Z21 ASYMPTOMATIC HUMAN IMMUNODEFICIENCY VIRUS (HIV) INFECTION STATUS (H): ICD-10-CM

## 2022-12-07 DIAGNOSIS — A74.9 CHLAMYDIA INFECTION: ICD-10-CM

## 2022-12-07 DIAGNOSIS — Z86.19 H/O SYPHILIS: ICD-10-CM

## 2022-12-07 DIAGNOSIS — A54.9 GONORRHEA: Primary | ICD-10-CM

## 2022-12-07 PROCEDURE — 250N000009 HC RX 250: Performed by: INTERNAL MEDICINE

## 2022-12-07 PROCEDURE — G0463 HOSPITAL OUTPT CLINIC VISIT: HCPCS

## 2022-12-07 PROCEDURE — 250N000011 HC RX IP 250 OP 636: Performed by: INTERNAL MEDICINE

## 2022-12-07 PROCEDURE — 99214 OFFICE O/P EST MOD 30 MIN: CPT | Mod: GC | Performed by: STUDENT IN AN ORGANIZED HEALTH CARE EDUCATION/TRAINING PROGRAM

## 2022-12-07 PROCEDURE — 96372 THER/PROPH/DIAG INJ SC/IM: CPT | Performed by: INTERNAL MEDICINE

## 2022-12-07 PROCEDURE — G0463 HOSPITAL OUTPT CLINIC VISIT: HCPCS | Performed by: STUDENT IN AN ORGANIZED HEALTH CARE EDUCATION/TRAINING PROGRAM

## 2022-12-07 RX ORDER — DOXYCYCLINE HYCLATE 100 MG
100 TABLET ORAL 2 TIMES DAILY
Qty: 14 TABLET | Refills: 0 | Status: SHIPPED | OUTPATIENT
Start: 2022-12-07 | End: 2022-12-14

## 2022-12-07 RX ORDER — LIDOCAINE HYDROCHLORIDE 10 MG/ML
1 INJECTION, SOLUTION EPIDURAL; INFILTRATION; INTRACAUDAL; PERINEURAL ONCE
Status: COMPLETED | OUTPATIENT
Start: 2022-12-07 | End: 2022-12-07

## 2022-12-07 RX ORDER — LIDOCAINE HYDROCHLORIDE 10 MG/ML
1 INJECTION, SOLUTION EPIDURAL; INFILTRATION; INTRACAUDAL; PERINEURAL ONCE
Status: CANCELLED
Start: 2022-12-07 | End: 2022-12-07

## 2022-12-07 RX ORDER — BICTEGRAVIR SODIUM, EMTRICITABINE, AND TENOFOVIR ALAFENAMIDE FUMARATE 50; 200; 25 MG/1; MG/1; MG/1
1 TABLET ORAL DAILY
Qty: 90 TABLET | Refills: 1 | Status: SHIPPED | OUTPATIENT
Start: 2022-12-07 | End: 2023-07-13

## 2022-12-07 RX ADMIN — CEFTRIAXONE SODIUM 500 MG: 500 INJECTION, POWDER, FOR SOLUTION INTRAMUSCULAR; INTRAVENOUS at 15:03

## 2022-12-07 RX ADMIN — LIDOCAINE HYDROCHLORIDE 1 ML: 10 INJECTION, SOLUTION EPIDURAL; INFILTRATION; INTRACAUDAL; PERINEURAL at 15:05

## 2022-12-07 NOTE — PATIENT INSTRUCTIONS
Treated for Gonorrhea today with an injection    Treatment for Chlamydia with pills     Lets restart the antiviral pills today and make sure to take them everyday going forward.    Lets see you back in 4-6 weeks to make sure the treatments are working. We iwll need to check your blood in 4-6 weeks to make sure the HIV medications still work and will need to do swabs again to make sure the STDs are cured.    [facility] [place] [facility] a good option for STD testing

## 2022-12-07 NOTE — NURSING NOTE
Chief Complaint   Patient presents with     RECHECK     STI treatment; B20 f/u     Blood pressure 128/81, pulse 84, weight 103.9 kg (229 lb), SpO2 98 %.    Hubert Johnston on 12/7/2022 at 3:06 PM

## 2022-12-07 NOTE — NURSING NOTE
The following medication was given:     MEDICATION: 500 mg ceftriaxone with 1 mL lidocaine  ROUTE: IM  SITE: [facility] Romario Johnston, EMT on 12/7/2022 at 3:09 PM

## 2022-12-07 NOTE — PROGRESS NOTES
The following medication was given:     MEDICATION:  Lidocaine without epinephrine  ROUTE: IM  SITE: RUQ - Gluteus  DOSE: 1 mL  LOT #: [id number]  : DLS  EXPIRATION DATE: 073126  NDC#: 835774   Was there drug waste? Yes  Amount of drug waste (mL): 1 mL.  Reason for waste:  Single use vial  Multi-dose vial: No    Hubert Johnston, RUDDY  December 7, 2022

## 2022-12-07 NOTE — LETTER
12/7/2022       RE: Aidan Henning  [address]   [address]  [facility] [address]     Dear Colleague,    Thank you for referring your patient, Aidan Henning, to the [facility] [place] [facility]. Please see a copy of my visit note below.     [facility]    Division of Infectious Diseases and International Medicine    HIV OUTPATIENT VISIT NOTE   Patient:  Aidan Henning, Date of birth 2000, Medical record number [id number]       Staff Attestation    I saw Aidan Henning in the clinic on Dec 7, 2022, with the fellow Dr. Colin. I have examined the patient and reviewed the history, vital signs, medications, labs and imaging. Assessment and plan were jointly made. I agree with and have edited the note and plan of care.     Queenie Keane   , [facility]  Pager - [phone]           Assessment and Plan     # HIV  - ART Continue Same Biktarvy  - Labs with Viral load in 1 month- needs quantiferon  - RTC in 4-6 weeks  - Medication Adherence discussed  - Opportunistic Infection prophylaxis Not due today based on CD-4 levels    #STI  - Gonococcal Treatment Ceftriaxone 500mg IM x 1  - Chlamydia Doxycycline 100mg BID x 7 days with test of cure re treat x 21 days if still positive  - RPR Stable  - Safe sex discussion, need for notification of partners, discussed red door clinic as an option for them.  - RTC for test of cure in 4-6 weeks    # Preventative Medicine  Immunizations:- Flu, HepA/B,Pneumococal, , HPV, Meningococcus all due- to discuss in more depth at upcoming visit    COVID-19: up to date    Influenza: Recommended Seasonal Vaccine    HPV: due- will adress at later visit    Hepatitis A: due    Hepatitis B:  due    Tdap: up to date    PCV13->23 due    Meningococcus: due    Cardiovascular Hyaden:                        Lipids: discuss at next visit    Obesity:  to discuss next visit               Renal Health: Monitor   Creatinine   Date Value Ref Range Status   10/31/2022 0.98 0.67 - 1.17 mg/dL Final   06/10/2021 0.90 0.66 - 1.25 mg/dL Final        Other       HPI/Subjective     Aidan Henning is a wonderful 22 year old year old patient who follows with us for HIV. Last seen 4/14/22 by Dr. Flores     Started on therapy 08/2021 after testing positive in 03/2021. He has h/o syphilis multiple times in the past. Was treated for late latent syphilis with three weekly shots of benzathine PCN around the time of starting ART. Achieved undetectable HIV viral load several months after starting ART . Lost to follow up and was somewhat inconsistent taking medications for about a month in August/September 2022 - Took every other day. Last HIV  10/31/22 and CD4 ~ 700.    He Had concerns that he may have been exposed to STD and came in for STD check 10/31/22. Was found to be positive for gonorrhea and chlamydia  In throat and rectum and RPR of titre of 2 indicating response to prior treatment.  He spent some time out of state and was unable to return for treatment until today.  He was asymptomatic at that time and continues to be asymptomatic.    While out of state he did have 1 ER visit 11/19/22 for left sided abdominal pain without diarrhea or rectal pain or tenesmus.  He attributed this to his medication [Biktarvy] but was not taking it consistently at that time nor had he had issues with the symptoms before.  The symptoms resolved spontaneously he did not receive any treatment in the emergency room.  He has been completely asymptomatic for over a month now.  He has returned back to [place] and is looking for treatment.    He is currently monogamous with a sexual partner and is exposed rectally and orally during sexual activities.          HIV Labs and History:     HIV    Diagnosed 6/10/2021- Viral load 6k CD4 600s   Started on Biktarvy  08/2021    After 4 months of  treatment- Viral load 0 CD4 808 (12/22/21)    Suboptimal adherence summer 2022- Viral load 741, CD4 699 10/31/22  ----------  Labs  Toxo IgG-ve, [id number] -ve  Quantiferon not done    Prior OI  None    Co-morbid Infections  Hep B: low titre as as of 6/10/21 - HbsAb  Hep C: No  TB Screening:not due today    Other STD  Syphilis: Previously treated   Gonococcal and Chlamydia in 10/2022    LABS     Latest Reference Range & Units 06/10/21 12:55 12/22/21 15:00 10/31/22 15:14 12/28/22 15:45   Rapid Plasma Reagin Titer Non Reactive  32 ! 1:8 (H) 1:2 (H) 1:2 (H)       Absolute CD4   Date Value Ref Range Status   06/10/2021 606 441 - 2,156 cells/uL Final     Absolute CD4, [place] T Cells   Date Value Ref Range Status   10/31/2022 699 441 - 2,156 cells/uL Final     HIV-1 RNA Quant Result   Date Value Ref Range Status   06/10/2021 6,123 (A) HIVND^HIV-1 RNA Not Detected [Copies]/mL Final     Comment:     The RYAN AmpliPrep/RYAN TaqMan HIV-1 test is an FDA-approved in vitro   nucleic acid amplification test for the quantitation of HIV-1 RNA in human   plasma (EDTA plasma) using the RYAN AmpliPrep instrument for automated viral   nucleic acid extraction and the RYAN TaqMan Analyzer or RYAN TaqMan for   automated Real Time PCR amplification and detection of the viral nucleic acid   target.  Titer results are reported in copies/ml. This assay is intended for use in   conjunction with clinical presentation and other laboratory markers of disease   prognosis and for use as an aid in assessing viral response to antiretroviral   treatment as measured by changes in plasma HIV-1 RNA levels. This test should   not be used as a donor screening test to confirm the presence of HIV-1   infection.       HIV-1 RNA copies/mL   Date Value Ref Range Status   10/28/2021 Not Detected Not Detected Copies/mL Final     Treponema Antibodies   Date Value Ref Range Status   06/10/2021 Reactive (A) NR^Nonreactive Final     Comment:     The Anti-Treponema  Antibody screening tends to remain positive for life,   therefore it does not distinguish between active and past syphilis infections.   All positive and equivocal results will automatically reflex to a   non-specific Rapid Plasma Reagin (RPR) test.  If the Treponema Antibody is positive, and the RPR is positive, this is   presumptive evidence of current infection, inadequately treated infection,   persistent infection or reinfection.  If the Anti-Treponema Antibody is positive and the RPR is negative, then a   second Treponema specific test (TP-PA) will be performed to determine whether   the antibody test is falsely positive or is detecting early infection.  If the   latter is suspected, repeat testing in approximately two weeks is   recommended.  Methodology Change: Test performed on the [employer] XL by Treponema   pallidum Total Antibodies Assay as of 3.17.2020.       Treponema Antibody Total   Date Value Ref Range Status   10/31/2022 Reactive (A) Nonreactive Final     Comment:     The Anti-Treponema Antibody screening tends to remain positive for life, therefore it does not distinguish between active and past syphilis infections. All positive and equivocal results will automatically reflex to a non-specific Rapid Plasma Reagin (RPR) test.    If the Treponema Antibody is positive, and the RPR is positive, this is presumptive evidence of current infection, inadequately treated infection, persistent infection or reinfection.    If the Anti-Treponema Antibody is positive and the RPR is negative, then a second Treponema specific test (TP-PA) will be performed to determine whether the antibody test is falsely positive or is detecting early in fection.  If the latter is suspected, repeat testing in approximately two weeks is recommended.     Hep B Surface Agn   Date Value Ref Range Status   06/10/2021 Nonreactive NR^Nonreactive Final           Review of Systems:     The following systems were reviewed with the  patient as they pertain to the case and are negative unless noted here or above in the HPI. The patient was  able to participate in the following review of systems    REVIEW OF SYSTEMS:     Constitutional: No fevers, no chills, no sweats  Cardiac: No history of chest pain, No palpitations  Respiratory: No shortness of breath, no wheeze, no cough  Gastro Intestinal: No history of abdominal pain, no vomiting, no diarrhea  Neurological: No headaches, no new or changing weakness, no new or changingnumbness  Musculoskeletal: No joint pain or swelling HEENT: No congestion No stridor  Psychiatric: No reported hallucinations, No obvious delusions  Allergic: No Hives No Rash  Haematologic: No Easy Bruising, No Nosebleeds  Genitourinary: No Dysuria, No Frequency  Endocrine: No Polyuria, No Polydipsia  Skin/Integumentary: No Rash, No Ulcer  Opthalmologic: No Diplopia, No Flashes        Other Medical History:     Attempt was made to collect past, family and social history during this encounter,  this information was reviewed with the patient and updated    Allergies:  Patient has no known allergies.    Past Medical History  HIV  BMI eleavted PastSurgical History  None   Family History  No family history on file. Social History  He reports that he has been smoking. He has never used smokeless tobacco. He reports current alcohol use. He reports current drug use. Drug: Marijuana.   Notable Exposures  MSM Vaccination History:  Immunization History   Administered Date(s) Administered     COVID-19 Vaccine 18+ (Moderna) 08/11/2021, 12/22/2021     COVID-19 Vaccine Bivalent Booster 12+ (Pfizer) 10/31/2022     Comvax (HIB/HepB) 10/01/2001     DTAP (<7y) 1998, 10/01/2001, 03/13/2003, 12/03/2003     Hep B, Peds or Adolescent 1998, 1998     Historic Hib Prohibit 1998     MMR 10/01/2001     Pneumococcal (PCV 7) 10/01/2001     Poliovirus, inactivated (IPV) 1998, 10/01/2001, 12/03/2003     Smallpox/Mpox Vaccine  Subcutaneous (JYNNEOS) 10/31/2022     Tdap (Adacel,Boostrix) 11/02/2011, 03/19/2022     Varicella 11/02/2011             Physical Exam:     VITAL SIGNS:  Blood pressure 128/81, pulse 84, weight 103.9 kg (229 lb), SpO2 98 %.     PHYSICAL EXAM:  Eyes:     no ptosis, no discharge, no scleral icterus  Mouth, Throat:     mucous membranes moist, pharynx normal without lesions  Cardiovascular:    Inspection: No Cyanosis, JVD not elevated   Auscultation:  S1, S2 normal, regular rate and rhythm  Respiratory:     Inspection: Not in respiratory distress, Chest expansion symmetrical   Auscultation: 4 point auscultation done clear to auscultation bilaterally, no wheezes, no rales, and no rhonchi  Gastrointestinal:      soft, non-tender; bowel sounds normal; no masses,  no organomegaly  Musculoskeletal:     no elbow wrist knee or ankle tenderness, deformity or swelling, no quadriceps calf or upper arm muscular tenderness noted  Skin:     Exposed skin is dry without rash or ulcer  Neurologic:     Higher Mental Function: Conversant, AOx4   Motor: Ambulatory   Sensory: crude touch intact in all 4 limbs  Psychiatric:     appropriate        Signature:     Gerson Colin MD   PGY-V Infectious Disease Fellow    This dictation was prepared in part using Dragon recognition software.  As a result errors may occur. When identified these transcription errors have been corrected.  While every attempt is made to correct errors during dictation, errors may still exist       The following medication was given:     MEDICATION:  Lidocaine without epinephrine  ROUTE: IM  SITE: RUQ - Gluteus  DOSE: 1 mL  LOT #: [id number]  : [employer]ana  EXPIRATION DATE: 073126  NDC#: 412258   Was there drug waste? Yes  Amount of drug waste (mL): 1 mL.  Reason for waste:  Single use vial  Multi-dose vial: No    Hubert Johnston, EMT  December 7, 2022

## 2022-12-08 ENCOUNTER — TELEPHONE (OUTPATIENT)
Dept: INFECTIOUS DISEASES | Facility: CLINIC | Age: 22
End: 2022-12-08

## 2022-12-08 NOTE — TELEPHONE ENCOUNTER
[facility] 12/8 to schedule 4 week follow up with Dr. Colin for around 1/4/23 per checkout notes from 12/7/22.

## 2022-12-28 ENCOUNTER — OFFICE VISIT (OUTPATIENT)
Dept: INFECTIOUS DISEASES | Facility: CLINIC | Age: 22
End: 2022-12-28
Attending: INTERNAL MEDICINE
Payer: COMMERCIAL

## 2022-12-28 ENCOUNTER — LAB (OUTPATIENT)
Dept: LAB | Facility: CLINIC | Age: 22
End: 2022-12-28
Payer: COMMERCIAL

## 2022-12-28 VITALS
BODY MASS INDEX: 39.95 KG/M2 | WEIGHT: 234 LBS | HEIGHT: 64 IN | SYSTOLIC BLOOD PRESSURE: 125 MMHG | HEART RATE: 83 BPM | OXYGEN SATURATION: 98 % | DIASTOLIC BLOOD PRESSURE: 80 MMHG

## 2022-12-28 DIAGNOSIS — Z86.19 HISTORY OF SYPHILIS: ICD-10-CM

## 2022-12-28 DIAGNOSIS — A54.9 GONORRHEA: ICD-10-CM

## 2022-12-28 DIAGNOSIS — A74.9 CHLAMYDIA: ICD-10-CM

## 2022-12-28 DIAGNOSIS — B20 HUMAN IMMUNODEFICIENCY VIRUS (H): Primary | ICD-10-CM

## 2022-12-28 DIAGNOSIS — A53.9 SYPHILIS: ICD-10-CM

## 2022-12-28 DIAGNOSIS — Z21 ASYMPTOMATIC HUMAN IMMUNODEFICIENCY VIRUS (HIV) INFECTION STATUS (H): ICD-10-CM

## 2022-12-28 DIAGNOSIS — Z21 HUMAN IMMUNODEFICIENCY VIRUS I INFECTION (H): Primary | ICD-10-CM

## 2022-12-28 LAB
ALBUMIN SERPL BCG-MCNC: 4.1 G/DL (ref 3.5–5.2)
ALP SERPL-CCNC: 90 U/L (ref 40–129)
ALT SERPL W P-5'-P-CCNC: 26 U/L (ref 10–50)
ANION GAP SERPL CALCULATED.3IONS-SCNC: 6 MMOL/L (ref 7–15)
AST SERPL W P-5'-P-CCNC: 24 U/L (ref 10–50)
BASOPHILS # BLD AUTO: 0 10E3/UL (ref 0–0.2)
BASOPHILS NFR BLD AUTO: 1 %
BILIRUB SERPL-MCNC: 0.3 MG/DL
BUN SERPL-MCNC: 6 MG/DL (ref 6–20)
CALCIUM SERPL-MCNC: 9.5 MG/DL (ref 8.6–10)
CHLORIDE SERPL-SCNC: 105 MMOL/L (ref 98–107)
CREAT SERPL-MCNC: 1.01 MG/DL (ref 0.67–1.17)
DEPRECATED HCO3 PLAS-SCNC: 28 MMOL/L (ref 22–29)
EOSINOPHIL # BLD AUTO: 0.1 10E3/UL (ref 0–0.7)
EOSINOPHIL NFR BLD AUTO: 3 %
ERYTHROCYTE [DISTWIDTH] IN BLOOD BY AUTOMATED COUNT: 11.9 % (ref 10–15)
GFR SERPL CREATININE-BSD FRML MDRD: >90 ML/MIN/1.73M2
GLUCOSE SERPL-MCNC: 116 MG/DL (ref 70–99)
HCT VFR BLD AUTO: 44.5 % (ref 40–53)
HGB BLD-MCNC: 14.7 G/DL (ref 13.3–17.7)
IMM GRANULOCYTES # BLD: 0 10E3/UL
IMM GRANULOCYTES NFR BLD: 0 %
LYMPHOCYTES # BLD AUTO: 2.3 10E3/UL (ref 0.8–5.3)
LYMPHOCYTES NFR BLD AUTO: 51 %
MCH RBC QN AUTO: 31.2 PG (ref 26.5–33)
MCHC RBC AUTO-ENTMCNC: 33 G/DL (ref 31.5–36.5)
MCV RBC AUTO: 95 FL (ref 78–100)
MONOCYTES # BLD AUTO: 0.4 10E3/UL (ref 0–1.3)
MONOCYTES NFR BLD AUTO: 8 %
NEUTROPHILS # BLD AUTO: 1.7 10E3/UL (ref 1.6–8.3)
NEUTROPHILS NFR BLD AUTO: 37 %
NRBC # BLD AUTO: 0 10E3/UL
NRBC BLD AUTO-RTO: 0 /100
PLATELET # BLD AUTO: 341 10E3/UL (ref 150–450)
POTASSIUM SERPL-SCNC: 4 MMOL/L (ref 3.4–5.3)
PROT SERPL-MCNC: 6.9 G/DL (ref 6.4–8.3)
RBC # BLD AUTO: 4.71 10E6/UL (ref 4.4–5.9)
SODIUM SERPL-SCNC: 139 MMOL/L (ref 136–145)
WBC # BLD AUTO: 4.5 10E3/UL (ref 4–11)

## 2022-12-28 PROCEDURE — 87903 PHENOTYPE DNA HIV W/CULTURE: CPT | Mod: 90 | Performed by: PATHOLOGY

## 2022-12-28 PROCEDURE — G0463 HOSPITAL OUTPT CLINIC VISIT: HCPCS | Performed by: STUDENT IN AN ORGANIZED HEALTH CARE EDUCATION/TRAINING PROGRAM

## 2022-12-28 PROCEDURE — 87591 N.GONORRHOEAE DNA AMP PROB: CPT | Mod: 90 | Performed by: PATHOLOGY

## 2022-12-28 PROCEDURE — 86360 T CELL ABSOLUTE COUNT/RATIO: CPT | Mod: 90 | Performed by: PATHOLOGY

## 2022-12-28 PROCEDURE — 87491 CHLMYD TRACH DNA AMP PROBE: CPT | Mod: 90 | Performed by: PATHOLOGY

## 2022-12-28 PROCEDURE — 99000 SPECIMEN HANDLING OFFICE-LAB: CPT | Performed by: PATHOLOGY

## 2022-12-28 PROCEDURE — 86359 T CELLS TOTAL COUNT: CPT | Mod: 90 | Performed by: PATHOLOGY

## 2022-12-28 PROCEDURE — 86592 SYPHILIS TEST NON-TREP QUAL: CPT | Mod: 90 | Performed by: PATHOLOGY

## 2022-12-28 PROCEDURE — 36415 COLL VENOUS BLD VENIPUNCTURE: CPT | Performed by: PATHOLOGY

## 2022-12-28 PROCEDURE — 85025 COMPLETE CBC W/AUTO DIFF WBC: CPT | Performed by: PATHOLOGY

## 2022-12-28 PROCEDURE — 87906 NFCT AGT GNTYP ALYS HIV1: CPT | Mod: 90 | Performed by: PATHOLOGY

## 2022-12-28 PROCEDURE — 87904 PHENOTYPE DNA HIV W/CLT ADD: CPT | Mod: 90 | Performed by: PATHOLOGY

## 2022-12-28 PROCEDURE — 80053 COMPREHEN METABOLIC PANEL: CPT | Performed by: PATHOLOGY

## 2022-12-28 PROCEDURE — 87901 NFCT AGT GNTYP ALYS HIV1 REV: CPT | Mod: 90 | Performed by: PATHOLOGY

## 2022-12-28 PROCEDURE — 87536 HIV-1 QUANT&REVRSE TRNSCRPJ: CPT | Mod: 90 | Performed by: PATHOLOGY

## 2022-12-28 PROCEDURE — 87491 CHLMYD TRACH DNA AMP PROBE: CPT | Performed by: STUDENT IN AN ORGANIZED HEALTH CARE EDUCATION/TRAINING PROGRAM

## 2022-12-28 PROCEDURE — 87591 N.GONORRHOEAE DNA AMP PROB: CPT | Performed by: STUDENT IN AN ORGANIZED HEALTH CARE EDUCATION/TRAINING PROGRAM

## 2022-12-28 PROCEDURE — 86780 TREPONEMA PALLIDUM: CPT | Mod: 90 | Performed by: PATHOLOGY

## 2022-12-28 PROCEDURE — 86593 SYPHILIS TEST NON-TREP QUANT: CPT | Mod: 90 | Performed by: PATHOLOGY

## 2022-12-28 PROCEDURE — 99204 OFFICE O/P NEW MOD 45 MIN: CPT | Mod: GC | Performed by: STUDENT IN AN ORGANIZED HEALTH CARE EDUCATION/TRAINING PROGRAM

## 2022-12-28 NOTE — PROGRESS NOTES
[facility]    Division of Infectious Diseases and International Medicine    HIV OUTPATIENT VISIT NOTE   Patient:  Aidan Henning, Date of birth 2000, Medical record number [id number]       Assessment and Plan     # HIV  - ART Continue Same Biktarvy  - Labs with Viral load today  - RTC in 3-6 months or sooner if issues  - Medication Adherence discussed  - Opportunistic Infection prophylaxis Not due today based on CD-4 levels    #STI  - Repeat Rectal Throat and Urine GC PCR as test of cure  - Safe sex discussion, need for notification of partners, discussed [facility] clinic as an option for them.  - RTC for test of cure in 4-6 weeks    # Preventative Medicine  Immunizations:- Flu, HepA/B,Pneumococal, , HPV, Meningococcus all due- to discuss in more depth at upcoming visit    COVID-19: up to date    Influenza: Recommended Seasonal Vaccine    mPox    HPV: due-    Hepatitis A: due    Hepatitis B:  due    Tdap: up to date    PCV13->23 due    Meningococcus: due    Cardiovascular Hayden:                        Lipids: discuss at next visit              Renal Health: Monitor   Creatinine   Date Value Ref Range Status   12/28/2022 1.01 0.67 - 1.17 mg/dL Final   06/10/2021 0.90 0.66 - 1.25 mg/dL Final        Other       HPI/Subjective     Aidan Henning is a wonderful 22 year old year old patient who follows with us for HIV.    I saw him earlier this month when he was diagnosed with rectal and pharyngeal gonorrhea and chlamydia.  He received treatment at that time.  He is here for follow-up.  His only new symptom is some itching on his chest.  He did have some similar symptoms in the past when he has previously had syphilis.  He is not exposed to any new sexual partners.  He has no other complaints or questions today.  He is primarily here at our request for test of cure and further counseling.  He is adherent to his HIV medications.           HIV Labs and History:     HIV    Diagnosed  6/10/2021- Viral load 6k CD4 600s   Started on Biktarvy  08/2021    After 4 months of treatment- Viral load 0 CD4 808 (12/22/21)    Suboptimal adherence summer 2022- Viral load 741, CD4 699 10/31/22  ----------  Labs  Toxo IgG-ve, [id number] -ve  Quantiferon not done    Prior OI  None    Co-morbid Infections  Hep B: low titre as as of 6/10/21  Hep C: No  TB Screening:not due today    Other STD  Syphilis: Previously treated   Gonococcal and Chlamydia in 2022    LABS  Absolute CD4   Date Value Ref Range Status   06/10/2021 606 441 - 2,156 cells/uL Final     Absolute CD4, [place] T Cells   Date Value Ref Range Status   10/31/2022 699 441 - 2,156 cells/uL Final     HIV-1 RNA Quant Result   Date Value Ref Range Status   06/10/2021 6,123 (A) HIVND^HIV-1 RNA Not Detected [Copies]/mL Final     Comment:     The RYAN AmpliPrep/RYAN TaqMan HIV-1 test is an FDA-approved in vitro   nucleic acid amplification test for the quantitation of HIV-1 RNA in human   plasma (EDTA plasma) using the RYAN AmpliPrep instrument for automated viral   nucleic acid extraction and the RYAN TaqMan Analyzer or [employer] TaqMan for   automated Real Time PCR amplification and detection of the viral nucleic acid   target.  Titer results are reported in copies/ml. This assay is intended for use in   conjunction with clinical presentation and other laboratory markers of disease   prognosis and for use as an aid in assessing viral response to antiretroviral   treatment as measured by changes in plasma HIV-1 RNA levels. This test should   not be used as a donor screening test to confirm the presence of HIV-1   infection.       HIV-1 RNA copies/mL   Date Value Ref Range Status   10/28/2021 Not Detected Not Detected Copies/mL Final     Treponema Antibodies   Date Value Ref Range Status   06/10/2021 Reactive (A) NR^Nonreactive Final     Comment:     The Anti-Treponema Antibody screening tends to remain positive for life,   therefore it does not distinguish  between active and past syphilis infections.   All positive and equivocal results will automatically reflex to a   non-specific Rapid Plasma Reagin (RPR) test.  If the Treponema Antibody is positive, and the RPR is positive, this is   presumptive evidence of current infection, inadequately treated infection,   persistent infection or reinfection.  If the Anti-Treponema Antibody is positive and the RPR is negative, then a   second Treponema specific test (TP-PA) will be performed to determine whether   the antibody test is falsely positive or is detecting early infection.  If the   latter is suspected, repeat testing in approximately two weeks is   recommended.  Methodology Change: Test performed on the [employer] XL by Treponema   pallidum Total Antibodies Assay as of 3.17.2020.       Treponema Antibody Total   Date Value Ref Range Status   10/31/2022 Reactive (A) Nonreactive Final     Comment:     The Anti-Treponema Antibody screening tends to remain positive for life, therefore it does not distinguish between active and past syphilis infections. All positive and equivocal results will automatically reflex to a non-specific Rapid Plasma Reagin (RPR) test.    If the Treponema Antibody is positive, and the RPR is positive, this is presumptive evidence of current infection, inadequately treated infection, persistent infection or reinfection.    If the Anti-Treponema Antibody is positive and the RPR is negative, then a second Treponema specific test (TP-PA) will be performed to determine whether the antibody test is falsely positive or is detecting early in fection.  If the latter is suspected, repeat testing in approximately two weeks is recommended.     Hep B Surface Agn   Date Value Ref Range Status   06/10/2021 Nonreactive NR^Nonreactive Final           Review of Systems:     The following systems were reviewed with the patient as they pertain to the case and are negative unless noted here or above in the  HPI. The patient was  able to participate in the following review of systems    REVIEW OF SYSTEMS:     Constitutional: No fevers, no chills, no sweats  Cardiac: No history of chest pain, No palpitations  Respiratory: No shortness of breath, no wheeze, no cough  Gastro Intestinal: No history of abdominal pain, no vomiting, no diarrhea  Neurological: No headaches, no new or changing weakness, no new or changingnumbness  Musculoskeletal: No joint pain or swelling HEENT: No congestion No stridor  Psychiatric: No reported hallucinations, No obvious delusions  Allergic: No Hives No Rash  Haematologic: No Easy Bruising, No Nosebleeds  Genitourinary: No Dysuria, No Frequency  Endocrine: No Polyuria, No Polydipsia  Skin/Integumentary: No Rash, No Ulcer  Opthalmologic: No Diplopia, No Flashes        Other Medical History:     Attempt was made to collect past, family and social history during this encounter,  this information was reviewed with the patient and updated    Allergies:  Patient has no known allergies.    Past Medical History  HIV  BMI eleavted PastSurgical History  None   Family History  No family history on file. Social History  He reports that he has been smoking. He has never used smokeless tobacco. He reports current alcohol use. He reports current drug use. Drug: Marijuana.   Notable Exposures  MSM Vaccination History:  Immunization History   Administered Date(s) Administered     COVID-19 Vaccine 18+ (Moderna) 08/11/2021, 12/22/2021     COVID-19 Vaccine Bivalent Booster 12+ (Pfizer) 10/31/2022     Comvax (HIB/HepB) 10/01/2001     DTAP (<7y) 1998, 10/01/2001, 03/13/2003, 12/03/2003     Hep B, Peds or Adolescent 1998, 1998     Historic Hib Prohibit 1998     MMR 10/01/2001     Pneumococcal (PCV 7) 10/01/2001     Poliovirus, inactivated (IPV) 1998, 10/01/2001, 12/03/2003     Smallpox/Mpox Vaccine Subcutaneous (JYNNEOS) 10/31/2022     Tdap (Adacel,Boostrix) 11/02/2011, 03/19/2022      "Varicella 11/02/2011             Physical Exam:     VITAL SIGNS:  Blood pressure 125/80, pulse 83, height 1.626 m (5' 4.02\"), weight 106.1 kg (234 lb), SpO2 98 %.     PHYSICAL EXAM:  Eyes:     no ptosis, no discharge, no scleral icterus  Mouth, Throat:     mucous membranes moist, pharynx normal without lesions  Cardiovascular:    Inspection: No Cyanosis, JVD not elevated   Auscultation:  S1, S2 normal, regular rate and rhythm  Respiratory:     Inspection: Not in respiratory distress, Chest expansion symmetrical   Auscultation: 4 point auscultation done clear to auscultation bilaterally, no wheezes, no rales, and no rhonchi  Gastrointestinal:      soft, non-tender; bowel sounds normal; no masses,  no organomegaly  Musculoskeletal:     no elbow wrist knee or ankle tenderness, deformity or swelling, no quadriceps calf or upper arm muscular tenderness noted  Skin:     Exposed skin is dry without rash or ulcer  Neurologic:     Higher Mental Function: Conversant, AOx4   Motor: Ambulatory   Sensory: crude touch intact in all 4 limbs  Psychiatric:     appropriate        Signature:     Gerson Colin MD   PGY-V Infectious Disease Fellow    This dictation was prepared in part using Dragon recognition software.  As a result errors may occur. When identified these transcription errors have been corrected.  While every attempt is made to correct errors during dictation, errors may still exist   "

## 2022-12-28 NOTE — NURSING NOTE
"Chief Complaint   Patient presents with     RECHECK     B20     Blood pressure 125/80, pulse 83, height 1.626 m (5' 4.02\"), weight 106.1 kg (234 lb), SpO2 98 %.    Hubert Johnston on 12/28/2022 at 2:19 PM  "

## 2022-12-29 LAB
C TRACH DNA SPEC QL NAA+PROBE: NEGATIVE
CD3 CELLS # BLD: 1677 CELLS/UL (ref 603–2990)
CD3 CELLS NFR BLD: 67 % (ref 49–84)
CD3+CD4+ CELLS # BLD: 893 CELLS/UL (ref 441–2156)
CD3+CD4+ CELLS NFR BLD: 36 % (ref 28–63)
CD3+CD4+ CELLS/CD3+CD8+ CLL BLD: 1.2 % (ref 1.4–2.6)
CD3+CD8+ CELLS # BLD: 746 CELLS/UL (ref 125–1312)
CD3+CD8+ CELLS NFR BLD: 30 % (ref 10–40)
N GONORRHOEA DNA SPEC QL NAA+PROBE: NEGATIVE
T CELL COMMENT: ABNORMAL
T PALLIDUM AB SER QL: REACTIVE

## 2022-12-30 LAB
HIV1 RNA # PLAS NAA DL=20: NOT DETECTED COPIES/ML
PATHOLOGY STUDY: NORMAL
RPR SER QL: REACTIVE
RPR SER-TITR: ABNORMAL {TITER}

## 2023-01-02 ENCOUNTER — TELEPHONE (OUTPATIENT)
Dept: INFECTIOUS DISEASES | Facility: CLINIC | Age: 23
End: 2023-01-02

## 2023-01-02 NOTE — TELEPHONE ENCOUNTER
EP tried calling 1/2 but line went busy. Called to schedule 5 month (around 5/28/23) follow up with Dr. Colin per checkout notes from 12/28/22.

## 2023-01-02 NOTE — PROGRESS NOTES
[facility] Staff Note: Mr. Henning was seen, examined, and the case was discussed with Dr. Colin, ID Fellow -- I agree with his interval history and examination, assessment and plan in this outpatient ID / HIV Progress note. This note reflects my observations and opinions and the plan outlined fully reflects my approach. I have reviewed the available history, radiology, laboratory results, and reports with the Fellow.

## 2023-01-21 LAB
GENE ANALYSIS NARR RPT DOC: NORMAL
HIV 1 RNA GENO + PHENO ISLT: NORMAL
HIV SUSC PNL ISLT PHENOTYP: NORMAL
HIV-1 PSGT + INTEGRASE, GENOTYPE: NORMAL
LABORATORY COMMENT REPORT: NORMAL

## 2023-03-01 DIAGNOSIS — Z11.3 ROUTINE SCREENING FOR STI (SEXUALLY TRANSMITTED INFECTION): ICD-10-CM

## 2023-03-01 DIAGNOSIS — Z86.19 HISTORY OF SYPHILIS: ICD-10-CM

## 2023-03-01 DIAGNOSIS — Z21 HUMAN IMMUNODEFICIENCY VIRUS I INFECTION (H): Primary | ICD-10-CM
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
## 2023-03-01 NOTE — Clinical Note
Spoke with patient today just to follow up as he is on my panel and he asked for me to have you call him. When I asked the reason he said for immunizations, I told him that I would be the one to review and we did. I thought that I would still send you a message to call him I am wondering if there is something else that he wants.

## 2023-03-06 ENCOUNTER — LAB (OUTPATIENT)
Dept: LAB | Facility: CLINIC | Age: 23
End: 2023-03-06
Payer: COMMERCIAL

## 2023-03-06 ENCOUNTER — ALLIED HEALTH/NURSE VISIT (OUTPATIENT)
Dept: INFECTIOUS DISEASES | Facility: CLINIC | Age: 23
End: 2023-03-06
Attending: INTERNAL MEDICINE
Payer: COMMERCIAL

## 2023-03-06 DIAGNOSIS — Z86.19 HISTORY OF SYPHILIS: ICD-10-CM

## 2023-03-06 DIAGNOSIS — Z11.3 ROUTINE SCREENING FOR STI (SEXUALLY TRANSMITTED INFECTION): ICD-10-CM

## 2023-03-06 DIAGNOSIS — Z21 ASYMPTOMATIC HUMAN IMMUNODEFICIENCY VIRUS (HIV) INFECTION STATUS (H): ICD-10-CM

## 2023-03-06 DIAGNOSIS — Z21 HUMAN IMMUNODEFICIENCY VIRUS I INFECTION (H): ICD-10-CM

## 2023-03-06 LAB
ALBUMIN SERPL BCG-MCNC: 4.5 G/DL (ref 3.5–5.2)
ALP SERPL-CCNC: 109 U/L (ref 40–129)
ALT SERPL W P-5'-P-CCNC: 45 U/L (ref 10–50)
ANION GAP SERPL CALCULATED.3IONS-SCNC: 11 MMOL/L (ref 7–15)
AST SERPL W P-5'-P-CCNC: 34 U/L (ref 10–50)
BASOPHILS # BLD AUTO: 0 10E3/UL (ref 0–0.2)
BASOPHILS NFR BLD AUTO: 1 %
BILIRUB SERPL-MCNC: 0.5 MG/DL
BUN SERPL-MCNC: 8.3 MG/DL (ref 6–20)
C TRACH DNA SPEC QL NAA+PROBE: NEGATIVE
CALCIUM SERPL-MCNC: 9.8 MG/DL (ref 8.6–10)
CD3 CELLS # BLD: 1321 CELLS/UL (ref 603–2990)
CD3 CELLS NFR BLD: 68 % (ref 49–84)
CD3+CD4+ CELLS # BLD: 777 CELLS/UL (ref 441–2156)
CD3+CD4+ CELLS NFR BLD: 40 % (ref 28–63)
CD3+CD4+ CELLS/CD3+CD8+ CLL BLD: 1.51 % (ref 1.4–2.6)
CD3+CD8+ CELLS # BLD: 516 CELLS/UL (ref 125–1312)
CD3+CD8+ CELLS NFR BLD: 27 % (ref 10–40)
CHLORIDE SERPL-SCNC: 103 MMOL/L (ref 98–107)
CREAT SERPL-MCNC: 1.04 MG/DL (ref 0.67–1.17)
DEPRECATED HCO3 PLAS-SCNC: 25 MMOL/L (ref 22–29)
EOSINOPHIL # BLD AUTO: 0 10E3/UL (ref 0–0.7)
EOSINOPHIL NFR BLD AUTO: 1 %
ERYTHROCYTE [DISTWIDTH] IN BLOOD BY AUTOMATED COUNT: 11.7 % (ref 10–15)
GFR SERPL CREATININE-BSD FRML MDRD: >90 ML/MIN/1.73M2
GLUCOSE SERPL-MCNC: 111 MG/DL (ref 70–99)
HCT VFR BLD AUTO: 46 % (ref 40–53)
HGB BLD-MCNC: 15.7 G/DL (ref 13.3–17.7)
IMM GRANULOCYTES # BLD: 0 10E3/UL
IMM GRANULOCYTES NFR BLD: 1 %
LYMPHOCYTES # BLD AUTO: 1.9 10E3/UL (ref 0.8–5.3)
LYMPHOCYTES NFR BLD AUTO: 35 %
MCH RBC QN AUTO: 31.8 PG (ref 26.5–33)
MCHC RBC AUTO-ENTMCNC: 34.1 G/DL (ref 31.5–36.5)
MCV RBC AUTO: 93 FL (ref 78–100)
MONOCYTES # BLD AUTO: 0.5 10E3/UL (ref 0–1.3)
MONOCYTES NFR BLD AUTO: 8 %
N GONORRHOEA DNA SPEC QL NAA+PROBE: NEGATIVE
NEUTROPHILS # BLD AUTO: 3.1 10E3/UL (ref 1.6–8.3)
NEUTROPHILS NFR BLD AUTO: 54 %
NRBC # BLD AUTO: 0 10E3/UL
NRBC BLD AUTO-RTO: 0 /100
PLATELET # BLD AUTO: 332 10E3/UL (ref 150–450)
POTASSIUM SERPL-SCNC: 4 MMOL/L (ref 3.4–5.3)
PROT SERPL-MCNC: 7.7 G/DL (ref 6.4–8.3)
RBC # BLD AUTO: 4.93 10E6/UL (ref 4.4–5.9)
SODIUM SERPL-SCNC: 139 MMOL/L (ref 136–145)
T CELL COMMENT: NORMAL
WBC # BLD AUTO: 5.6 10E3/UL (ref 4–11)

## 2023-03-06 PROCEDURE — 87591 N.GONORRHOEAE DNA AMP PROB: CPT

## 2023-03-06 PROCEDURE — 80053 COMPREHEN METABOLIC PANEL: CPT | Performed by: PATHOLOGY

## 2023-03-06 PROCEDURE — 87491 CHLMYD TRACH DNA AMP PROBE: CPT

## 2023-03-06 PROCEDURE — 86780 TREPONEMA PALLIDUM: CPT | Performed by: STUDENT IN AN ORGANIZED HEALTH CARE EDUCATION/TRAINING PROGRAM

## 2023-03-06 PROCEDURE — 36415 COLL VENOUS BLD VENIPUNCTURE: CPT | Performed by: PATHOLOGY

## 2023-03-06 PROCEDURE — 85025 COMPLETE CBC W/AUTO DIFF WBC: CPT | Performed by: PATHOLOGY

## 2023-03-06 PROCEDURE — 87536 HIV-1 QUANT&REVRSE TRNSCRPJ: CPT | Performed by: STUDENT IN AN ORGANIZED HEALTH CARE EDUCATION/TRAINING PROGRAM

## 2023-03-06 PROCEDURE — 86481 TB AG RESPONSE T-CELL SUSP: CPT | Performed by: STUDENT IN AN ORGANIZED HEALTH CARE EDUCATION/TRAINING PROGRAM

## 2023-03-06 PROCEDURE — 86360 T CELL ABSOLUTE COUNT/RATIO: CPT | Performed by: STUDENT IN AN ORGANIZED HEALTH CARE EDUCATION/TRAINING PROGRAM

## 2023-03-06 PROCEDURE — 86593 SYPHILIS TEST NON-TREP QUANT: CPT | Performed by: STUDENT IN AN ORGANIZED HEALTH CARE EDUCATION/TRAINING PROGRAM

## 2023-03-06 PROCEDURE — 86592 SYPHILIS TEST NON-TREP QUAL: CPT | Performed by: STUDENT IN AN ORGANIZED HEALTH CARE EDUCATION/TRAINING PROGRAM

## 2023-03-06 NOTE — PROGRESS NOTES
Pt is here  today and is requesting STI testing.     Per [facility] Ambulatory Care Protocol, STI orders entered for pt.    Throat and Rectal samples collected today with no complications. Patient tolerated well. Risk reduction information verbally provided to patient. Patient informed they will be called with results of sexually transmitted disease testing as soon as they are finalized.      Tarun Hurd RN  Infectious Disease on 3/6/2023 at 10:54 AM

## 2023-03-07 LAB
HIV1 RNA # PLAS NAA DL=20: <20 COPIES/ML
HIV1 RNA SERPL NAA+PROBE-LOG#: <1.3 {LOG_COPIES}/ML
RPR SER QL: REACTIVE
RPR SER-TITR: ABNORMAL {TITER}
T PALLIDUM AB SER QL: REACTIVE

## 2023-03-08 LAB
GAMMA INTERFERON BACKGROUND BLD IA-ACNC: 0.04 IU/ML
M TB IFN-G BLD-IMP: NEGATIVE
M TB IFN-G CD4+ BCKGRND COR BLD-ACNC: 9.96 IU/ML
MITOGEN IGNF BCKGRD COR BLD-ACNC: 0.02 IU/ML
MITOGEN IGNF BCKGRD COR BLD-ACNC: 0.03 IU/ML
QUANTIFERON MITOGEN: 10 IU/ML
QUANTIFERON NIL TUBE: 0.04 IU/ML
QUANTIFERON TB1 TUBE: 0.06 IU/ML
QUANTIFERON TB2 TUBE: 0.07

## 2023-03-15 ENCOUNTER — OFFICE VISIT (OUTPATIENT)
Dept: INFECTIOUS DISEASES | Facility: CLINIC | Age: 23
End: 2023-03-15
Attending: STUDENT IN AN ORGANIZED HEALTH CARE EDUCATION/TRAINING PROGRAM
Payer: COMMERCIAL

## 2023-03-15 ENCOUNTER — LAB (OUTPATIENT)
Dept: LAB | Facility: CLINIC | Age: 23
End: 2023-03-15
Payer: COMMERCIAL

## 2023-03-15 ENCOUNTER — TELEPHONE (OUTPATIENT)
Dept: INFECTIOUS DISEASES | Facility: CLINIC | Age: 23
End: 2023-03-15
Payer: COMMERCIAL

## 2023-03-15 VITALS
HEIGHT: 64 IN | BODY MASS INDEX: 39.18 KG/M2 | DIASTOLIC BLOOD PRESSURE: 77 MMHG | TEMPERATURE: 98.2 F | HEART RATE: 94 BPM | WEIGHT: 229.5 LBS | OXYGEN SATURATION: 95 % | SYSTOLIC BLOOD PRESSURE: 111 MMHG

## 2023-03-15 DIAGNOSIS — Z86.19 HISTORY OF SYPHILIS: ICD-10-CM

## 2023-03-15 DIAGNOSIS — H93.12 TINNITUS OF LEFT EAR: ICD-10-CM

## 2023-03-15 DIAGNOSIS — L30.8 ASTEATOTIC ECZEMA: ICD-10-CM

## 2023-03-15 DIAGNOSIS — Z21 HUMAN IMMUNODEFICIENCY VIRUS I INFECTION (H): Primary | ICD-10-CM

## 2023-03-15 PROCEDURE — 86593 SYPHILIS TEST NON-TREP QUANT: CPT | Performed by: STUDENT IN AN ORGANIZED HEALTH CARE EDUCATION/TRAINING PROGRAM

## 2023-03-15 PROCEDURE — 99214 OFFICE O/P EST MOD 30 MIN: CPT | Mod: GC | Performed by: STUDENT IN AN ORGANIZED HEALTH CARE EDUCATION/TRAINING PROGRAM

## 2023-03-15 PROCEDURE — 86592 SYPHILIS TEST NON-TREP QUAL: CPT | Performed by: STUDENT IN AN ORGANIZED HEALTH CARE EDUCATION/TRAINING PROGRAM

## 2023-03-15 PROCEDURE — G0463 HOSPITAL OUTPT CLINIC VISIT: HCPCS | Performed by: STUDENT IN AN ORGANIZED HEALTH CARE EDUCATION/TRAINING PROGRAM

## 2023-03-15 PROCEDURE — 86780 TREPONEMA PALLIDUM: CPT | Performed by: STUDENT IN AN ORGANIZED HEALTH CARE EDUCATION/TRAINING PROGRAM

## 2023-03-15 PROCEDURE — 36415 COLL VENOUS BLD VENIPUNCTURE: CPT | Performed by: PATHOLOGY

## 2023-03-15 ASSESSMENT — PAIN SCALES - GENERAL: PAINLEVEL: NO PAIN (0)

## 2023-03-15 NOTE — PATIENT INSTRUCTIONS
Blood work looks mostly good    Repeat one blood test today    I put in referral for hearing testing, I can send you to ear doctors after.    I will be in touch after test results.    Try the moisturizing plan we discussed.    Let me know if anything else comes up.    If all is well I can see you back in 6 months, or sooner if needed

## 2023-03-15 NOTE — PROGRESS NOTES
[facility]    Division of Infectious Diseases and International Medicine    HIV OUTPATIENT VISIT NOTE   Patient:  Aidan Henning, Date of birth 2000, Medical record number [id number]       Assessment and Plan     # HIV  - ART Continue Same Biktarvy  - Labs with Viral load today  - RTC in 3-6 months or sooner if issues  - Medication Adherence discussed  - Opportunistic Infection prophylaxis Not due today based on CD-4 levels    #STI  - Repeat RPR today for clarification if 4 or lower then repeat in 3 months  - No indication for treatment at present    # Tinnitus  - Will document and evaluate given background of syphilis it will be good to formally evaluate and quantify incase RPR rises then a concern of neurosyphilis may arise. IF any abnormality on audiometery we can sedn to ENT for evaluation if he desires. I do not think this represents neurosyphilis at present given stable RPR titres ( needs to change by at least 2 fold to consider new infection)    # Preventative Medicine  Immunizations:- Flu, HepA/B,Pneumococal, , HPV, Meningococcus all due- did not want to discuss today    COVID-19: up to date    Influenza: Recommended Seasonal Vaccine    mPox    HPV: due-    Hepatitis A: due    Hepatitis B:  due    Tdap: up to date    PCV13->23 due    Meningococcus: due    Cardiovascular Hayden:                        Lipids: discuss at next visit              Renal Health: Monitor   Creatinine   Date Value Ref Range Status   03/06/2023 1.04 0.67 - 1.17 mg/dL Final   06/10/2021 0.90 0.66 - 1.25 mg/dL Final        Other       HPI/Subjective     Aidan Henning is a wonderful 22 year old year old patient who follows with us for HIV.    He is doing pretty well today.  He had his routine labs done about 2 weeks ago.  His RPR up trended slightly from 1: 2-1: 4.  He does have a history of syphilis in the past and his RPR was 1: 32 at that time.  He has not had any new partners.  He has not had  any new skin lesions.  He does have ongoing papular pruritic lesions scattered over his body.  These have been ongoing since the winter season.  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  He does have a single papular lesion on his inner thigh.  This is nonpruritic nontender and nondraining.    He has had no other genital lesions no other lesions in his mouth or anywhere else.    He does note ongoing tinnitus and occasionally some hearing issues in his left ear.  He does endorse some loud noise exposure perhaps through concerts in the past.  He is eager to have this evaluated.    We spent the majority of our discussion today talking about the implications of syphilis testing the need for at least 2 fold increase in titers to diagnose a relapse infection and the need for ongoing safe sexual practices.    From HIV perspective he is doing well he is virally suppressed and adherent to his medications.  He does have a new job which he enjoys.           HIV Labs and History:     HIV    Diagnosed 6/10/2021- Viral load 6k CD4 600s   Started on Biktarvy  08/2021    After 4 months of treatment- Viral load 0 CD4 808 (12/22/21)    Suboptimal adherence summer 2022- Viral load 741, CD4 699 10/31/22  ----------  Labs  Toxo IgG-ve, [id number] -ve  Quantiferon not done    Prior OI  None    Co-morbid Infections  Hep B: low titre as as of 6/10/21  Hep C: No  TB Screening:not due today    Other STD  Syphilis: Previously treated   Gonococcal and Chlamydia in 2022    LABS  Absolute CD4   Date Value Ref Range Status   06/10/2021 606 441 - 2,156 cells/uL Final     Absolute CD4, [place] [facility] T Cells   Date Value Ref Range Status   03/06/2023 777 441 - 2,156 cells/uL Final     HIV-1 RNA Quant Result   Date Value Ref Range Status   06/10/2021 6,123 (A) HIVND^HIV-1 RNA Not Detected [Copies]/mL Final     Comment:     The RYAN AmpliPrep/RYAN TaqMan HIV-1 test is an FDA-approved in vitro   nucleic acid amplification test for  the quantitation of HIV-1 RNA in human   plasma (EDTA plasma) using the RYAN AmpliPrep instrument for automated viral   nucleic acid extraction and the RYAN TaqMan Analyzer or RYAN TaqMan for   automated Real Time PCR amplification and detection of the viral nucleic acid   target.  Titer results are reported in copies/ml. This assay is intended for use in   conjunction with clinical presentation and other laboratory markers of disease   prognosis and for use as an aid in assessing viral response to antiretroviral   treatment as measured by changes in plasma HIV-1 RNA levels. This test should   not be used as a donor screening test to confirm the presence of HIV-1   infection.       HIV-1 RNA copies/mL   Date Value Ref Range Status   03/06/2023 <20 (A) Not Detected Copies/mL Final     Treponema Antibodies   Date Value Ref Range Status   06/10/2021 Reactive (A) NR^Nonreactive Final     Comment:     The Anti-Treponema Antibody screening tends to remain positive for life,   therefore it does not distinguish between active and past syphilis infections.   All positive and equivocal results will automatically reflex to a   non-specific Rapid Plasma Reagin (RPR) test.  If the Treponema Antibody is positive, and the RPR is positive, this is   presumptive evidence of current infection, inadequately treated infection,   persistent infection or reinfection.  If the Anti-Treponema Antibody is positive and the RPR is negative, then a   second Treponema specific test (TP-PA) will be performed to determine whether   the antibody test is falsely positive or is detecting early infection.  If the   latter is suspected, repeat testing in approximately two weeks is   recommended.  Methodology Change: Test performed on the [employer] Liaison XL by Treponema   pallidum Total Antibodies Assay as of 3.17.2020.       Treponema Antibody Total   Date Value Ref Range Status   03/15/2023 Reactive (A) Nonreactive Final     Comment:     The  Anti-Treponema Antibody screening tends to remain positive for life, therefore it does not distinguish between active and past syphilis infections. All positive and equivocal results will automatically reflex to a non-specific Rapid Plasma Reagin (RPR) test.    If the Treponema Antibody is positive, and the RPR is positive, this is presumptive evidence of current infection, inadequately treated infection, persistent infection or reinfection.    If the Anti-Treponema Antibody is positive and the RPR is negative, then a second Treponema specific test (TP-PA) will be performed to determine whether the antibody test is falsely positive or is detecting early infection.  If the latter is suspected, repeat testing in approximately two weeks is recommended.     Hep B Surface Agn   Date Value Ref Range Status   06/10/2021 Nonreactive NR^Nonreactive Final           Review of Systems:     The following systems were reviewed with the patient as they pertain to the case and are negative unless noted here or above in the HPI. The patient was  able to participate in the following review of systems    REVIEW OF SYSTEMS:     Constitutional: No fevers, no chills, no sweats  Cardiac: No history of chest pain, No palpitations  Respiratory: No shortness of breath, no wheeze, no cough  Gastro Intestinal: No history of abdominal pain, no vomiting, no diarrhea  Neurological: No headaches, no new or changing weakness, no new or changingnumbness  Musculoskeletal: No joint pain or swelling HEENT: No congestion No stridor  Psychiatric: No reported hallucinations, No obvious delusions  Allergic: No Hives No Rash  Haematologic: No Easy Bruising, No Nosebleeds  Genitourinary: No Dysuria, No Frequency  Endocrine: No Polyuria, No Polydipsia  Skin/Integumentary: No Rash, No Ulcer  Opthalmologic: No Diplopia, No Flashes        Other Medical History:     Attempt was made to collect past, family and social history during this encounter,  this  "information was reviewed with the patient and updated    Allergies:  Patient has no known allergies.    Past Medical History  HIV  BMI eleavted PastSurgical History  None   Family History  No family history on file. Social History  He reports that he has been smoking. He has never used smokeless tobacco. He reports current alcohol use. He reports current drug use. Drug: Marijuana.   Notable Exposures  MSM Vaccination History:  Immunization History   Administered Date(s) Administered     COVID-19 Vaccine 18+ (Moderna) 08/11/2021, 12/22/2021     COVID-19 Vaccine Bivalent Booster 12+ (Pfizer) 10/31/2022     Comvax (HIB/HepB) 10/01/2001     DTAP (<7y) 1998, 10/01/2001, 03/13/2003, 12/03/2003     Hepatits B (Peds <19Y) 1998, 1998     Historic Hib Prohibit 1998     MMR 10/01/2001     Pneumococcal (PCV 7) 10/01/2001     Poliovirus, inactivated (IPV) 1998, 10/01/2001, 12/03/2003     Smallpox/Mpox Vaccine Subcutaneous (JYNNEOS) 10/31/2022     Tdap (Adacel,Boostrix) 11/02/2011, 03/19/2022     Varicella 11/02/2011             Physical Exam:     VITAL SIGNS:  Blood pressure 111/77, pulse 94, temperature 98.2  F (36.8  C), temperature source Oral, height 1.626 m (5' 4.02\"), weight 104.1 kg (229 lb 8 oz), SpO2 95 %.     PHYSICAL EXAM:  Eyes:     no ptosis, no discharge, no scleral icterus  Mouth, Throat:     mucous membranes moist, pharynx normal without lesions  Cardiovascular:    Inspection: No Cyanosis, JVD not elevated   Auscultation:  S1, S2 normal, regular rate and rhythm  Respiratory:     Inspection: Not in respiratory distress, Chest expansion symmetrical   Auscultation: 4 point auscultation done clear to auscultation bilaterally, no wheezes, no rales, and no rhonchi  Gastrointestinal:      soft, non-tender; bowel sounds normal; no masses,  no organomegaly  Musculoskeletal:     no elbow wrist knee or ankle tenderness, deformity or swelling, no quadriceps calf or upper arm muscular tenderness " noted  Skin:     Patient has few nodular excoriated lesions over his sternum that look most suggestive of asteatotic eczema.  Next line patient has few nodular hyperpigmented lesions on his left inner thigh that appears most suggestive of a sebaceous cyst.    Neurologic:     Higher Mental Function: Conversant, AOx4   Motor: Ambulatory   Sensory: crude touch intact in all 4 limbs  Psychiatric:     appropriate        Signature:     Gerson Colin MD   PGY-V Infectious Disease Fellow    This dictation was prepared in part using Dragon recognition software.  As a result errors may occur. When identified these transcription errors have been corrected.  While every attempt is made to correct errors during dictation, errors may still exist

## 2023-03-15 NOTE — NURSING NOTE
"Chief Complaint   Patient presents with     RECHECK     B20     /77   Pulse 94   Temp 98.2  F (36.8  C) (Oral)   Ht 1.626 m (5' 4.02\")   Wt 104.1 kg (229 lb 8 oz)   SpO2 95%   BMI 39.37 kg/m        Katy Rashid MA  "

## 2023-03-16 PROBLEM — Z21 ASYMPTOMATIC HIV INFECTION (H): Status: ACTIVE | Noted: 2023-03-16

## 2023-03-16 LAB
RPR SER QL: REACTIVE
RPR SER-TITR: ABNORMAL {TITER}
T PALLIDUM AB SER QL: REACTIVE

## 2023-03-17 ENCOUNTER — TELEPHONE (OUTPATIENT)
Dept: INFECTIOUS DISEASES | Facility: CLINIC | Age: 23
End: 2023-03-17
Payer: COMMERCIAL

## 2023-03-17 NOTE — TELEPHONE ENCOUNTER
Contains abnormal data Rapid Plasma Reagin Titer  Order: [id number]   Status: Final result      Visible to patient: Yes (seen)      Dx: History of syphilis      0 Result Notes  Component Ref Range & Units 2 d ago  (3/15/23) 11 d ago  (3/6/23) 2 mo ago  (12/28/22) 4 mo ago  (10/31/22) 1 yr ago  (12/22/21)   Rapid Plasma Reagin Titer Non Reactive 1:4 High   1:4 High   1:2 High   1:2 High   1:8 High

## 2023-03-19 PROBLEM — Z21 ASYMPTOMATIC HIV INFECTION (H): Status: RESOLVED | Noted: 2023-03-16 | Resolved: 2023-03-19

## 2023-03-19 PROBLEM — Z21 HUMAN IMMUNODEFICIENCY VIRUS I INFECTION (H): Status: ACTIVE | Noted: 2023-03-16

## 2023-03-19 PROBLEM — Z21 HUMAN IMMUNODEFICIENCY VIRUS I INFECTION (H): Status: ACTIVE | Noted: 2023-03-19

## 2023-03-19 PROBLEM — Z21 HUMAN IMMUNODEFICIENCY VIRUS I INFECTION (H): Status: ACTIVE | Noted: 2021-08-05

## 2023-03-20 ENCOUNTER — TELEPHONE (OUTPATIENT)
Dept: INFECTIOUS DISEASES | Facility: CLINIC | Age: 23
End: 2023-03-20
Payer: COMMERCIAL

## 2023-03-20 NOTE — TELEPHONE ENCOUNTER
EP called 3/20 to sched 6 month follow up with Dr. Colin per checkout notes from 3/15. Appt is set for 9/20 via in-person.

## 2023-03-20 NOTE — PROGRESS NOTES
[facility] Staff Note: Mr. Henning was seen, examined, and the case was discussed with Dr. Colin, ID Fellow -- I agree with his interval history and examination, assessment and plan in this outpatient ID / HIV Progress note. This note reflects my observations and opinions and the plan outlined fully reflects my approach. I have reviewed the available history, radiology, laboratory results, and reports with the Fellow.

## 2023-07-13 DIAGNOSIS — Z21 ASYMPTOMATIC HUMAN IMMUNODEFICIENCY VIRUS (HIV) INFECTION STATUS (H): ICD-10-CM

## 2023-07-13 RX ORDER — BICTEGRAVIR SODIUM, EMTRICITABINE, AND TENOFOVIR ALAFENAMIDE FUMARATE 50; 200; 25 MG/1; MG/1; MG/1
TABLET ORAL
Qty: 90 TABLET | Refills: 0 | Status: SHIPPED | OUTPATIENT
Start: 2023-07-13 | End: 2023-10-18

## 2023-08-03 ENCOUNTER — TELEPHONE (OUTPATIENT)
Dept: INFECTIOUS DISEASES | Facility: CLINIC | Age: 23
End: 2023-08-03
Payer: COMMERCIAL

## 2023-08-03 NOTE — TELEPHONE ENCOUNTER
EP called 8/3 to resched 8/15 follow up with Dr. Colin; provider only accepting transplant pts. Appt is set for 9/6 with Dr. Pickard.     ----- Message from Sharla Carney RN sent at 8/3/2023  9:27 AM CDT -----  Yes please!! Thank you!  ----- Message -----  From: Mary Gloria  Sent: 8/3/2023   9:23 AM CDT  To: Sharla Carney RN    Hi,     Should I cancel the 8/15 follow up with Dr. Colin and resched with another B20 provider?    Thanks,   Mary   ----- Message -----  From: Sharla Carney RN  Sent: 8/3/2023   8:45 AM CDT  To: Mary Gloria    Hi!    This patient previously saw Dr Colin, but should see a different HIV provider now. Would you mind getting him switched to soonest avail 30m in person HIV provider?    Thanks!

## 2023-08-03 NOTE — TELEPHONE ENCOUNTER
[facility] Call Center    Phone Message    May a detailed message be left on voicemail: yes     Reason for Call: Form or Letter   Type or form/letter needing completion: Doctors note for missing work due to B20 Symptoms    Provider: Dr. Dixie Nieto     Date form needed: today 8/3/2023    Once completed: Patient will  at .    Action Taken: Other: ID     Travel Screening: Not Applicable

## 2023-08-03 NOTE — TELEPHONE ENCOUNTER
Called patient to check In on symptoms. He reports he has a headache this morning, so called into work. 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 I advised him to reach out to his PCP as we have not seen him in a bit and his symptoms do not seem related to HIV infection. Patient voiced understanding and will call PCP.

## 2023-08-28 DIAGNOSIS — Z11.3 SCREENING EXAMINATION FOR STD (SEXUALLY TRANSMITTED DISEASE): ICD-10-CM

## 2023-08-28 DIAGNOSIS — Z21 ASYMPTOMATIC HUMAN IMMUNODEFICIENCY VIRUS (HIV) INFECTION STATUS (H): Primary | ICD-10-CM

## 2023-08-28 DIAGNOSIS — Z86.19 H/O SYPHILIS: ICD-10-CM

## 2023-10-18 ENCOUNTER — TELEPHONE (OUTPATIENT)
Dept: INFECTIOUS DISEASES | Facility: CLINIC | Age: 23
End: 2023-10-18
Payer: COMMERCIAL

## 2023-10-18 DIAGNOSIS — Z21 ASYMPTOMATIC HUMAN IMMUNODEFICIENCY VIRUS (HIV) INFECTION STATUS (H): ICD-10-CM

## 2023-10-18 RX ORDER — BICTEGRAVIR SODIUM, EMTRICITABINE, AND TENOFOVIR ALAFENAMIDE FUMARATE 50; 200; 25 MG/1; MG/1; MG/1
1 TABLET ORAL DAILY
Qty: 90 TABLET | Refills: 0 | Status: SHIPPED | OUTPATIENT
Start: 2023-10-18 | End: 2024-01-12

## 2023-10-18 NOTE — TELEPHONE ENCOUNTER
EP called 10/18 to sched labs and follow up with any B20 ID provider per Shanae.     ----- Message from Sharla Carney RN sent at 10/18/2023  9:09 AM CDT -----  Hi,    Can we please get him scheduled for labs ASAP and a b20 follow up? He previously saw Dr Colin, but will need a new provider.    Thanks!

## 2023-11-17 ENCOUNTER — LAB (OUTPATIENT)
Dept: LAB | Facility: CLINIC | Age: 23
End: 2023-11-17
Payer: COMMERCIAL

## 2023-11-17 ENCOUNTER — OFFICE VISIT (OUTPATIENT)
Dept: INTERNAL MEDICINE | Facility: CLINIC | Age: 23
End: 2023-11-17
Payer: COMMERCIAL

## 2023-11-17 VITALS
SYSTOLIC BLOOD PRESSURE: 136 MMHG | DIASTOLIC BLOOD PRESSURE: 82 MMHG | BODY MASS INDEX: 38.36 KG/M2 | HEIGHT: 64 IN | OXYGEN SATURATION: 98 % | HEART RATE: 97 BPM | WEIGHT: 224.7 LBS

## 2023-11-17 DIAGNOSIS — M54.50 ACUTE BILATERAL LOW BACK PAIN WITHOUT SCIATICA: ICD-10-CM

## 2023-11-17 DIAGNOSIS — Z21 ASYMPTOMATIC HUMAN IMMUNODEFICIENCY VIRUS (HIV) INFECTION STATUS (H): ICD-10-CM

## 2023-11-17 DIAGNOSIS — Z11.3 SCREENING EXAMINATION FOR STD (SEXUALLY TRANSMITTED DISEASE): ICD-10-CM

## 2023-11-17 DIAGNOSIS — Z00.00 PREVENTATIVE HEALTH CARE: ICD-10-CM

## 2023-11-17 DIAGNOSIS — R53.83 OTHER FATIGUE: Primary | ICD-10-CM

## 2023-11-17 DIAGNOSIS — Z86.19 H/O SYPHILIS: ICD-10-CM

## 2023-11-17 LAB
ALBUMIN SERPL BCG-MCNC: 4.2 G/DL (ref 3.5–5.2)
ALP SERPL-CCNC: 88 U/L (ref 40–150)
ALT SERPL W P-5'-P-CCNC: 50 U/L (ref 0–70)
ANION GAP SERPL CALCULATED.3IONS-SCNC: 9 MMOL/L (ref 7–15)
AST SERPL W P-5'-P-CCNC: 35 U/L (ref 0–45)
BASOPHILS # BLD AUTO: 0.1 10E3/UL (ref 0–0.2)
BASOPHILS NFR BLD AUTO: 1 %
BILIRUB SERPL-MCNC: 0.3 MG/DL
BUN SERPL-MCNC: 17 MG/DL (ref 6–20)
CALCIUM SERPL-MCNC: 9.4 MG/DL (ref 8.6–10)
CD3 CELLS # BLD: 2260 CELLS/UL (ref 603–2990)
CD3 CELLS NFR BLD: 70 % (ref 49–84)
CD3+CD4+ CELLS # BLD: 1338 CELLS/UL (ref 441–2156)
CD3+CD4+ CELLS NFR BLD: 41 % (ref 28–63)
CD3+CD4+ CELLS/CD3+CD8+ CLL BLD: 1.52 % (ref 1.4–2.6)
CD3+CD8+ CELLS # BLD: 882 CELLS/UL (ref 125–1312)
CD3+CD8+ CELLS NFR BLD: 27 % (ref 10–40)
CHLORIDE SERPL-SCNC: 106 MMOL/L (ref 98–107)
CREAT SERPL-MCNC: 1.09 MG/DL (ref 0.67–1.17)
DEPRECATED HCO3 PLAS-SCNC: 23 MMOL/L (ref 22–29)
EGFRCR SERPLBLD CKD-EPI 2021: >90 ML/MIN/1.73M2
EOSINOPHIL # BLD AUTO: 0.2 10E3/UL (ref 0–0.7)
EOSINOPHIL NFR BLD AUTO: 3 %
ERYTHROCYTE [DISTWIDTH] IN BLOOD BY AUTOMATED COUNT: 11.7 % (ref 10–15)
GLUCOSE SERPL-MCNC: 108 MG/DL (ref 70–99)
HCT VFR BLD AUTO: 43.3 % (ref 40–53)
HGB BLD-MCNC: 14.9 G/DL (ref 13.3–17.7)
IMM GRANULOCYTES # BLD: 0 10E3/UL
IMM GRANULOCYTES NFR BLD: 0 %
LYMPHOCYTES # BLD AUTO: 2.7 10E3/UL (ref 0.8–5.3)
LYMPHOCYTES NFR BLD AUTO: 41 %
MCH RBC QN AUTO: 32.6 PG (ref 26.5–33)
MCHC RBC AUTO-ENTMCNC: 34.4 G/DL (ref 31.5–36.5)
MCV RBC AUTO: 95 FL (ref 78–100)
MONOCYTES # BLD AUTO: 0.7 10E3/UL (ref 0–1.3)
MONOCYTES NFR BLD AUTO: 10 %
NEUTROPHILS # BLD AUTO: 3 10E3/UL (ref 1.6–8.3)
NEUTROPHILS NFR BLD AUTO: 45 %
NRBC # BLD AUTO: 0 10E3/UL
NRBC BLD AUTO-RTO: 0 /100
PLATELET # BLD AUTO: 335 10E3/UL (ref 150–450)
POTASSIUM SERPL-SCNC: 4.3 MMOL/L (ref 3.4–5.3)
PROT SERPL-MCNC: 7 G/DL (ref 6.4–8.3)
RBC # BLD AUTO: 4.57 10E6/UL (ref 4.4–5.9)
SODIUM SERPL-SCNC: 138 MMOL/L (ref 135–145)
T CELL COMMENT: NORMAL
TSH SERPL DL<=0.005 MIU/L-ACNC: 1.84 UIU/ML (ref 0.3–4.2)
WBC # BLD AUTO: 6.7 10E3/UL (ref 4–11)

## 2023-11-17 PROCEDURE — 91320 SARSCV2 VAC 30MCG TRS-SUC IM: CPT | Performed by: INTERNAL MEDICINE

## 2023-11-17 PROCEDURE — 99000 SPECIMEN HANDLING OFFICE-LAB: CPT | Performed by: PATHOLOGY

## 2023-11-17 PROCEDURE — 90686 IIV4 VACC NO PRSV 0.5 ML IM: CPT | Performed by: INTERNAL MEDICINE

## 2023-11-17 PROCEDURE — 90480 ADMN SARSCOV2 VAC 1/ONLY CMP: CPT | Performed by: INTERNAL MEDICINE

## 2023-11-17 PROCEDURE — 84443 ASSAY THYROID STIM HORMONE: CPT | Performed by: PATHOLOGY

## 2023-11-17 PROCEDURE — 36415 COLL VENOUS BLD VENIPUNCTURE: CPT | Performed by: PATHOLOGY

## 2023-11-17 PROCEDURE — 85025 COMPLETE CBC W/AUTO DIFF WBC: CPT | Performed by: PATHOLOGY

## 2023-11-17 PROCEDURE — 86360 T CELL ABSOLUTE COUNT/RATIO: CPT | Performed by: INTERNAL MEDICINE

## 2023-11-17 PROCEDURE — 99395 PREV VISIT EST AGE 18-39: CPT | Mod: 25 | Performed by: INTERNAL MEDICINE

## 2023-11-17 PROCEDURE — 86593 SYPHILIS TEST NON-TREP QUANT: CPT | Performed by: INTERNAL MEDICINE

## 2023-11-17 PROCEDURE — 90471 IMMUNIZATION ADMIN: CPT | Performed by: INTERNAL MEDICINE

## 2023-11-17 PROCEDURE — 86592 SYPHILIS TEST NON-TREP QUAL: CPT | Performed by: INTERNAL MEDICINE

## 2023-11-17 PROCEDURE — 80053 COMPREHEN METABOLIC PANEL: CPT | Performed by: PATHOLOGY

## 2023-11-17 PROCEDURE — 87536 HIV-1 QUANT&REVRSE TRNSCRPJ: CPT | Performed by: INTERNAL MEDICINE

## 2023-11-17 ASSESSMENT — PATIENT HEALTH QUESTIONNAIRE - PHQ9: SUM OF ALL RESPONSES TO PHQ QUESTIONS 1-9: 13

## 2023-11-17 NOTE — PROGRESS NOTES
Aidan is a 23 year old that presents in clinic today for the following:     Chief Complaint   Patient presents with    Establish Care     Physical            11/17/2023     7:59 AM   Additional Questions   Roomed by Snehal Peralta       Screenings as of today     PHQ-2 Total Score (Adult) - Positive if 3 or more points; Administer   PHQ-9 if positive 3    PHQ-9 Total Score 13        Snehal Peralta MA at 8:07 AM on 11/17/2023

## 2023-11-17 NOTE — PROGRESS NOTES
"  Assessment & Plan     Other fatigue  Reviewed possible causes of fatigue with patient, including depression, worsening pain, as well as endocrine and metabolic abnormalities.  Recommend working on normalizing sleep schedule.  Patient was also encouraged to reconsider reschedule if getting up for morning shifts it is difficult for him.  Discussed possible management of depression.  However, given the short duration of symptoms, will monitor at this time.    Acute bilateral low back pain without sciatica  Discussed possible causes.  Given no red flags on exam, recommend evaluation by sports medicine.  Referral to physical therapy was also placed.  - Physical Therapy Referral; Future  - Orthopedic  Referral    Preventative health care  Recommend COVID-vaccine.  Patient was also advised on checking TSH.  His lipid screening as well as screening for diabetes is up-to-date.  - COVID-19 12+ (2023-24) (PFIZER)  - TSH with free T4 reflex; Future      I spent a total of 30 minutes on the day of the visit.   Time spent by me doing chart review, history and exam, documentation and further activities per the note       Nicotine/Tobacco Cessation:  He reports that he has been smoking. He has never used smokeless tobacco.  Nicotine/Tobacco Cessation Plan:         BMI:   Estimated body mass index is 38.55 kg/m  as calculated from the following:    Height as of this encounter: 1.626 m (5' 4.02\").    Weight as of this encounter: 101.9 kg (224 lb 11.2 oz).       Depression Screening Follow Up        11/17/2023     8:03 AM   PHQ   PHQ-9 Total Score 13   Q9: Thoughts of better off dead/self-harm past 2 weeks Not at all         Follow Up Actions Taken  Crisis resource information provided in After Visit Summary  Follow up recommended: 1 month          No follow-ups on file.    Jacquelyn Rivera MD  [facility] INTERNAL MEDICINE [place]    Lev Bermudez is a 23 year old, presenting for the following " "health issues:  Establish Care (Physical )      11/17/2023     7:59 AM   Additional Questions   Roomed by Snehal Peralta       HPI     Patient is establishing care and would like to discuss fatigue. He reports that he is waking up rested in the morning, however, often feels that he does not want to get out of bed. He occasionally has back pain that disrupts his sleep. He had a fall at work that started low back pain. He denies persistent numbness in lower extremities. He is currently working double shifts at work and early morning shifts are difficult for him.  He reports that he started to feel less motivated to get out of the house and out of bed approximately 2 weeks ago.  He denies any specific triggers for mood changes.  However, he has been trying to add another job in addition to working as a .  He is scheduled for follow-up with infectious disease next week.      Review of Systems   Constitutional, HEENT, cardiovascular, pulmonary, GI, , musculoskeletal, neuro, skin, endocrine and psych systems are negative, except as otherwise noted.      Objective    /82 (BP Location: Right arm, Patient Position: Sitting, Cuff Size: Adult Regular)   Pulse 97   Ht 1.626 m (5' 4.02\")   Wt 101.9 kg (224 lb 11.2 oz)   SpO2 98%   BMI 38.55 kg/m    Body mass index is 38.55 kg/m .  Physical Exam   GENERAL: healthy, alert and no distress  EYES: Eyes grossly normal to inspection, PERRL and conjunctivae and sclerae normal  RESP: lungs clear to auscultation - no rales, rhonchi or wheezes  CV: regular rate and rhythm, normal S1 S2, no S3 or S4, no murmur, click or rub, no peripheral edema and peripheral pulses strong  ABDOMEN: soft, nontender and bowel sounds normal  MS: no gross musculoskeletal defects noted, no edema  SKIN: no suspicious lesions or rashes  NEURO: Normal strength and tone, mentation intact and speech normal  LYMPH: no cervical, supraclavicular, axillary, or inguinal adenopathy            "

## 2023-11-17 NOTE — COMMUNITY RESOURCES LIST (ENGLISH)
11/17/2023   [facility]  N/A  For questions about this resource list or additional care needs, please contact your primary care clinic or care manager.  Phone: [phone]   Email: N/A   Address: [address] [place] [address]   Hours: N/A        Food and Nutrition       Food pantry  1  [facility] - Human Services - Community Food Shelf Distance: 0.31 miles      In-Person, Pickup   [address] [facility] [address] [place] [address]  Language: Sinhala, English, Guatemalan, Guatemalan Creole, Hmong, Mandarin, Oromo, Costa Rican, Irish  Hours: Mon - Thu 10:00 AM - 4:00 PM  Fees: Free   Phone: (501) 682-7363 Website: http://www.St. Cloud HospitalShotlst/human-services     2  [facility] Food Giveaway Distance: 0.83 miles      In-Person   [address] [place] [address]  Language: English, Irish  Hours: Wed - Fri 10:00 AM - 2:00 PM  Fees: Free   Phone: (992) 678-5617 Email: info@Jama Software Website: http://www.Jama Software/     SNAP application assistance  3  [facility] Human Services and Public Health Department [facility] Distance: 0.63 miles      In-Person, Phone/Virtual   [address] [place] [address]  Language: English  Hours: Mon - Fri 8:00 AM - 4:30 PM  Fees: Free   Phone: (293) 776-6183 Email: servicecenterinfo@[place]. Website: https://www.[place]./[facility]/facilities/service-center-info     4  [facility] Distance: 0.8 miles      In-Person, Phone/Virtual   [address] [place] [address]  Language: English, Figueroa, Danish  Hours: Mon - Fri 9:00 AM - 5:00 PM  Fees: Free   Phone: (847) 866-1684 Email: yudith@CCTV Wireless Website: https://www.Jubilater Interactive Media.StarShooter/MarketMeSuite.org/     Soup kitchen or free meals  [address] [place] [address] Distance: 0.17 miles      Pickup   [address] [place]  [address]  Language: English, Stateless  Hours: Tue - Thu 4:00 PM - 6:00 PM  Fees: Free   Phone: (849) 983-4444 Email: communications@[facility] HospitalMessageMe Website: https://www.[facility] Hospital – BuffaloAuto Mutemn.org/CalvinAuto MutePittsburgh     6  [place] & Recreation Board - [facility] Recreation [place] - [facility] Kitchen Distance: 0.64 miles      In-Person, Pickup   [address] [place] [address]  Language: English  Hours: Mon - Fri 3:00 PM - 9:00 PM , Sat 9:00 AM - 4:00 PM  Fees: Free   Phone: (981) 454-9335 Email: northSolaicxmons@Crockett HospitalHoneycomb Security Solutions Website: https://www.Mouth Of WilsonAdvanced TeleSensors/parks__destinations/recreation_centers/[facility]_recreation_center/          Hotlines and Helplines       Hotline - Housing crisis  7  [facility] Distance: 1.98 miles      Phone/Virtual   [address] [place] [address]  Language: English  Hours: Mon - Sun Open 24 Hours  Fees: Free   Phone: (303) 920-5861 Email: info@saintolaf.org Website: http://www.saintolaf.org/     8  [facility] Distance: 2.16 miles      Phone/Virtual   [address] [place] [address]  Language: English  Hours: Mon - Sun Open 24 Hours   Phone: (533) 704-7142 Email: info@WututuRobert Wood Johnson University Hospital at RahwaySlingr.org Website: http://www.[facility].org          Housing       Coordinated Entry access point  9  Adult Shelter Connect (ASC) Distance: 1.17 miles      In-Person, Phone/Virtual   [address] [place] [address]  Language: English, Stateless  Hours: Mon - Fri 10:00 AM - 5:30 PM , Mon - Sun 7:30 PM - 10:20 PM , Sat - Sun 1:00 PM - 5:30 PM  Fees: Free   Phone: (601) 792-5078 Email: info@Druidly Website: https://www.Green Power Corporation.org/our-programs/adult-shelter-connect-[place]/     10  [facility] - Adult retirement Connect [facility] Distance: 1.98 miles      In-Person   [address] [place] [address]  Language: English  Hours: Mon - Sat 10:00 PM - 5:00 PM  Fees: Free   Phone: (908) 772-8265 Email:  info@saintola.org Website: http://www.saintolaf.org/     Drop-in center or day shelter  11  [facility] Distance: 1.17 miles      In-Person   [address] [place] [address]  Language: English, Hmong, Dominican, Bermudian  Hours: Mon - Thu 8:30 AM - 4:30 PM , Sat - Sun 9:00 AM - 12:00 PM  Fees: Free   Phone: (151) 811-2801 Email: info@LineaQuattroingGuavass.org Website: https://DeepRockDrive.org/     12  PeaBandtastic.me Community Distance: 2.47 miles      In-Person   [address] [place] [address]  Language: English  Hours: Mon - Fri 12:00 PM - 3:00 PM  Fees: Free   Phone: (216) 196-1277 Email: Ivycorp@Yell.ru Website: http://Ivycorp.PresenceID/     Housing search assistance  13  [facility] Distance: 0.25 miles      Phone/Virtual   [address] [place] [address]  Language: English  Hours: Mon - Thu 9:00 AM - 5:00 PM  Fees: Free   Phone: (780) 371-1117 Email: info@[facility].org Website: https://www.Huitongda.com/[place].[facility]/     14  [facility] [place] - Human Services - [facility] Housing Supports Distance: 0.31 miles      In-Person, Phone/Virtual   [address] [facility] [address] [place] [address]  Language: Bulgarian, English, Turks and Caicos Islander, Turks and Ca[place] Islander Creole, Hmong, Mandarin, Oromo, Dominican, Bermudian  Hours: Mon - Fri 8:30 AM - 5:00 PM  Fees: Free   Phone: (686) 149-6924 Website: http://www.[facility].org/human-services     Shelter for families  15  [facility] Distance: 19.2 miles      In-Person   [address] [place] [address]  Language: English  Hours: Mon - Fri 3:00 PM - 9:00 AM , Sat - Sun Open 24 Hours  Fees: Free   Phone: (877) 709-4146 Ext.1 Website: https://www.saintandrews.org/2020/07/03/emergency-family-shelter/     Shelter for individuals  16  [facility] and [place] - [facility] [place] Distance: 1.19 miles      In-Person   [address] [place] [address]  Language: English  Hours: Mon - Sun 5:00 PM - 10:00 AM  Fees: Free, Self Pay   Phone: (942) 515-4330 Email: info@"[employer]Glide[employer]A." Website: https://www.Eddingpharm (Cayman).org/locations/higher-ground-shelter/     17  [facility] Distance: 1.44 miles      In-Person   [address] [place] [address]  Language: English  Hours: Mon - Fri 4:00 PM - 9:00 AM  Fees: Free   Phone: (170) 183-3050 Email: luz@[facility] Website: https://[facility].org/northern/[facility] Centerer/          Transportation       Free or low-cost transportation  18  [facility] - Bus Passes - Free or low-cost transportation Distance: 1.42 miles      In-Person   [address] [place] [address]  Language: English  Hours: Tue 9:30 AM - 11:30 AM , Thu 9:30 AM - 11:30 AM  Fees: Free   Phone: (425) 194-9424 Email: info@Laserlike Website: http://www.Laserlike/     19  [facility] Distance: 1.98 miles      In-Person   [address] [place] [address]  Language: English  Hours: Mon - Wed 9:30 AM - 12:00 PM , Mon - Wed 1:00 PM - 2:00 PM Appt. Only  Fees: Free   Phone: (919) 220-2572 Email: info@saintolaf.org Website: http://www.saintolaf.org/     Transportation to medical appointments  20  [facility]  [place] Islander Family Wellness (AIFW) Distance: 6.04 miles      In-Person   [address] [place] [address]  Language: Greenlandic, Indonesian, English, Gujarati, Partha, Lao, Urdu, Turkish, Georgian, Croatian  Hours: Mon - Wed 9:00 AM - 5:00 PM , Thu 12:00 PM - 6:00 PM , Fri 9:00 AM - 5:00 PM , Sun 10:30 AM - 2:00 PM Appt. Only  Fees: Free   Phone: (556) 867-6937 Email: info@Direct Dermatology.org Website: https://www.Direct Dermatology.org/     21  [place] Transportation Distance: 6.6 miles      In-Person   [address] [place] [address] [address] [place] [address]  Language: English  Hours: Mon - Sat 4:00 AM - 6:00 PM  Fees: Insurance, Self Pay   Phone: (667) 147-5467  Email: wili@June Blackbox.com Website: https://helposielect.[facility]./Bereniceect/Providers/Delight_Transportation/Transportation/2?returnUrl=%2FHelpMeConnect%2FSearch%2FBasicNeeds%2FTransportation%2FTransportationServices%3Fstart%3D40          Important Numbers & Websites       Emergency Services   911  [facility] Services   311  Poison Control   (471) 296-5797  Suicide Prevention Lifeline   (957) 536-1096 (TALK)  Child Abuse Hotline   (713) 124-1152 (4-A-Child)  Sexual Assault Hotline   (339) 529-7479 (HOPE)  National Runaway Safeline   (249) 352-4659 (RUNAWAY)  All-Options Talkline   (968) 937-8709  Substance Abuse Referral   (449) 639-8213 (HELP)

## 2023-11-17 NOTE — COMMUNITY RESOURCES LIST (ENGLISH)
11/17/2023   [facility] Outpatient [facility]  N/A  For additional resource needs, please contact your health insurance member services or your primary care team.  Phone: [phone]   Email: N/A   Address: [address] [place] [address]   Hours: N/A        Food and Nutrition       Food pantry  1  [facility] - Human Services - [facility] Food Shelf Distance: 0.31 miles      In-Person, Pickup   [address] [place] [address]  Language: Chinese, English, Pakistani, Pakistani Creole, Hmong, Mandarin, Oromo, Bhutanese, Paraguayan  Hours: Mon - Thu 10:00 AM - 4:00 PM  Fees: Free   Phone: (383) 678-2699 Website: http://www.[facility] [place] Medical CenterHire Space/human-services     2  [facility] Food Giveaway Distance: 0.83 miles      In-Person   [address] [place] [place] [id number]  Language: English, Paraguayan  Hours: Wed - Fri 10:00 AM - 2:00 PM  Fees: Free   Phone: (333) 802-6682 Email: info@Meridea Financial Software Website: http://www.Meridea Financial Software/     SNAP application assistance  3  [employer] [place] Distance: 10.06 miles      Phone/Virtual   [address] [facility] [address] [place] [address]  Language: English, Hmong, Nauruan, Bhutanese, Paraguayan  Hours: Mon - Fri 8:30 AM - 4:30 PM  Fees: Free   Phone: (654) 104-2729 Email: helpline@hungersolutions.org Website: https://www.hungersolutions.org/programs/[place]-food-helpline/     4  [facility] Services and Public Health Department [facility] Distance: 0.63 miles      In-Person, Phone/Virtual   [address] [facility] [place] [address]  Language: English  Hours: Mon - Fri 8:00 AM - 4:30 PM  Fees: Free   Phone: (948) 500-9536 Email: jayne@Coin. Website: https://www.Coin./[facility]/facilities/service-center-info     Soup kitchen or free meals  5  [facility] Distance: 0.17 miles      Pickup   [address] [place] [address]  Language: English, Kazakh  Hours: Tue - Thu 4:00 PM - 6:00 PM  Fees: Free   Phone: (842) 130-6855 Email: communications@[facility] St[facility] Benedict Health CenterGunosy Website: https://www.[facility] Hospital – TulsaTriea Systemsmn.org/CamdenTriea SystemsSan Bernardino     6  [place] & Recreation Board - [facility] Recreation [place] - [facility] Kitchen Distance: 0.64 miles      In-Person, Pickup   [address] [place] [address]  Language: English  Hours: Mon - Fri 3:00 PM - 9:00 PM , Sat 9:00 AM - 4:00 PM  Fees: Free   Phone: (940) 575-6822 Email: northcommons@WellsGermin8 Website: https://www.WellsGermin8/parks__destinations/recreation_centers/[place]_recreation_center/          Hotlines and Helplines       Hotline - Housing crisis  7  [facility] Distance: 1.98 miles      Phone/Virtual   [address] [place] [address]  Language: English  Hours: Mon - Sun Open 24 Hours  Fees: Free   Phone: (722) 211-1699 Email: info@saintolaf.org Website: http://www.saintolaf.org/     8  [facility] Distance: 2.16 miles      Phone/Virtual   [address] [place] [address]  Language: English  Hours: Mon - Sun Open 24 Hours   Phone: (808) 819-6795 Email: info@Broken Envelope Productions.org Website: http://www.[facility]thinkHenry [facility] Memorial Hospital.org          Housing       Coordinated Entry access point  9  Adult Shelter Connect (ASC) Distance: 1.17 miles      In-Person, Phone/Virtual   [address] [place] [address]  Language: English, Kazakh  Hours: Mon - Fri 10:00 AM - 5:30 PM , Mon - Sun 7:30 PM - 10:20 PM , Sat - Sun 1:00 PM - 5:30 PM  Fees: Free   Phone: (927) 797-3951 Email: info@StarGen Website: https://www.INCIDE.org/our-programs/adult-shelter-connect-[place]/     10  [facility] - Adult penitentiary Connect [facility] Distance: 1.98 miles      In-Person   [address] [place] [address]  Language: English  Hours: Mon - Sat 10:00 PM - 5:00 PM  Fees: Free   Phone: (151)  038-7798 Email: info@saintolaf.Minds + Machines Group Limited Website: http://www.saintSt. Mary's Regional Medical Center.org/     Drop-in center or day shelter  11  [facility] Distance: 1.17 miles      In-Person   [address] [place] [address]  Language: English, Hmong, Cymro, Belarusian  Hours: Mon - Thu 8:30 AM - 4:30 PM , Sat - Sun 9:00 AM - 12:00 PM  Fees: Free   Phone: (338) 744-6699 Email: info@CrowdRiseingBragsters.org Website: https://Well Done.org/     12  PlayPhilo.Com [facility] Distance: 2.47 miles      In-Person   [address] [place] [address]  Language: English  Hours: Mon - Fri 12:00 PM - 3:00 PM  Fees: Free   Phone: (123) 114-5518 Email: HubChilla@Green Highland Renewables.Livestation Website: http://HubChilla.Minds + Machines Group Limited/     Housing search assistance  13  HousingLink - Online housing search assistance Distance: 1.01 miles      Phone/Virtual   [address] [address]  Language: English, Hmong, Cymro, Belarusian  Hours: Mon - Sun Open 24 Hours   Phone: (147) 134-4928 Email: info@housinglink.org Website: http://www.housinglink.org/     14  Affordable Housing Online - https://Advanced Mem-Tech/ Distance: 2.11 miles      Phone/Virtual   [address] [place] [address]  Language: English  Hours: Mon - Sun Open 24 Hours   Email: info@Peak Website: https://Advanced Mem-Tech     Shelter for families  15  [facility] Family [facility] Distance: 19.2 miles      In-Person   [address] [place] [address]  Language: English  Hours: Mon - Fri 3:00 PM - 9:00 AM , Sat - Sun Open 24 Hours  Fees: Free   Phone: (706) 662-1906 Ext.1 Website: https://www.saintandBotkinss.org/2020/07/03/emergency-family-shelter/     Shelter for individuals  16  [facility] [place] [facility] Distance: 1.19 miles      In-Person   [address] [place] [address]  Language: English  Hours: Mon - Sun 5:00 PM - 10:00 AM  Fees: Free, Self Pay    Phone: (978) 774-3321 Email: info@RegainGo.TeraFold Biologics Inc. Website: https://www.[facility].org/locations/higher-ground-shelter/     17  [facility] Distance: 1.44 miles      In-Person   [address] [place] [address]  Language: English  Hours: Mon - Fri 4:00 PM - 9:00 AM  Fees: Free   Phone: (742) 629-1029 Email: luz@[facility] Website: https://centralMiners' Colfax Medical Center.[facility].org/[facility]/          Transportation       Free or low-cost transportation  18  [facility] - Bus Passes - Free or low-cost transportation Distance: 1.42 miles      In-Person   [address] [place] [address]  Language: English  Hours: Tue 9:30 AM - 11:30 AM , Thu 9:30 AM - 11:30 AM  Fees: Free   Phone: (248) 694-9017 Email: info@mae.TeraFold Biologics Inc. Website: http://www.Torax Medical/     19  [facility] Distance: 1.98 miles      In-Person   [address] [place] [address]  Language: English  Hours: Mon - Wed 9:30 AM - 12:00 PM , Mon - Wed 1:00 PM - 2:00 PM Appt. Only  Fees: Free   Phone: (816) 285-7046 Email: info@saintolaBlaze health Website: http://www.saintolaBlaze health/     Transportation to medical appointments  20  [facility]  Bhutanese Family Wellness (AIFW) Distance: 6.04 miles      In-Person   [address] [place] [address]  Language: Kinyarwanda, Uzbek, English, Gujarati, Partha, Sami, Occitan, Telugu, Chinese, Khmer  Hours: Mon - Wed 9:00 AM - 5:00 PM , Thu 12:00 PM - 6:00 PM , Fri 9:00 AM - 5:00 PM , Sun 10:30 AM - 2:00 PM Appt. Only  Fees: Free   Phone: (407) 621-9069 Email: info@[facility]-aifw.org Website: https://www.sewa-aifw.org/     21  [employer] Transportation Distance: 6.6 miles      In-Person   [address] [facility] [address] [place] [address]  Language: English  Hours: Mon - Sat 4:00 AM - 6:00 PM  Fees: Insurance, Self Pay   Phone: (379) 657-3550 Email: delighttranstoñoation1@CAPS Entreprise.apprupt Website:  https://helpmeconnect.web.[facility]./HelpMeConnect/Providers/Delight_Transportation/Transportation/2?returnUrl=%2FHelpMeConnect%2FSearch%2FBasicNeeds%2FTransportation%2FTransportationServices%3Fstart%3D40          Important Numbers & Websites       [facility]   [address] 211[address].[facility]  Poison Control   (981) 274-5694 [place]poison.org  Suicide and Crisis Lifeline   [address] [place], [place] [address]line.org  Childhelp [place] Child Abuse Hotline   423.906.8252 Childhelphotline.org  [place] Sexual Assault Hotline   (427) 871-9139 (HOPE) [facility] Runaway Safeline   (124) 128-7762 (RUNAWAY) [facility] Centerrunaway.org  Pregnancy & Postpartum Support [place]   Call/text 322-316-1939 Ppsupportmn.org  Substance Abuse National Helpline ([facility]   899-990-HELP (6783) Findtreatment.gov  Emergency Services   916

## 2023-11-17 NOTE — COMMUNITY RESOURCES LIST (ENGLISH)
11/17/2023   [facility] - Outpatient Clinics  N/A  For additional resource needs, please contact your health insurance member services or your primary care team.  Phone: [phone]   Email: N/A   Address: [address] [place] [address]   Hours: N/A        Food and Nutrition       Food pantry  1  [facility] - Human Services - [facility] Food Shelf Distance: 0.31 miles      In-Person, Pickup   [address] [place] [address]  Language: Luxembourgish, English, South African, South African Creole, Hmong, Mandarin, Oromo, Iraqi, Belarusian  Hours: Mon - Thu 10:00 AM - 4:00 PM  Fees: Free   Phone: (569) 508-5296 Website: http://www.[facility] HospitalAmerican Retail Alliance Corporation/human-services     2  [facility] Food Giveaway Distance: 0.83 miles      In-Person   [address] [id number]  Language: English, Belarusian  Hours: Wed - Fri 10:00 AM - 2:00 PM  Fees: Free   Phone: (815) 212-9763 Email: info@Koinify Website: http://www.Koinify/     SNAP application assistance  3  [employer] [place] Distance: 10.06 miles      Phone/Virtual   [address] [facility] [place] [address]  Language: English, Hmong, Paraguayan, Iraqi, Belarusian  Hours: Mon - Fri 8:30 AM - 4:30 PM  Fees: Free   Phone: (865) 929-8768 Email: helpline@hungersolutions.org Website: https://www.hungersolutions.org/programs/[place]-food-helpline/     4  [facility] Services and [facility] Distance: 0.63 miles      In-Person, Phone/Virtual   [address] [place] [address]  Language: English  Hours: Mon - Fri 8:00 AM - 4:30 PM  Fees: Free   Phone: (377) 559-1800 Email: jayne@Peck. Website: https://www.Peck./[facility]/facilities/service-center-info     Soup kitchen or free meals  5  [facility] [place] Distance: 0.17 miles      Pickup   [address] [place] [address]  Language: English, Upper sorbian  Hours: Tue - Thu 4:00 PM - 6:00 PM  Fees: Free   Phone: (685) 739-5739 Email: communications@Bowdle HospitalStarSightings Website: https://www.[facility] NormanNetworkmn.org/CherryNetwork[place]     6  [place] & Recreation [facility] - [facility] Recreation [place] [facility] Kitchen Distance: 0.64 miles      In-Person, Pickup   [address] [place] [address]  Language: English  Hours: Mon - Fri 3:00 PM - 9:00 PM , Sat 9:00 AM - 4:00 PM  Fees: Free   Phone: (183) 543-2916 Email: northcommons@[employer] Website: https://www.[employer] O[employer]/parks__destinations/recreation_centers/[place]_[facility]_recreation_center/          Hotlines and Helplines       Hotline - Housing crisis  7  [facility] Distance: 1.98 miles      Phone/Virtual   [address] [place] [address]  Language: English  Hours: Mon - Sun Open 24 Hours  Fees: Free   Phone: (515) 398-8585 Email: info@saintolaf.org Website: http://www.saintolaf.org/     8  [facility] Distance: 2.16 miles      Phone/Virtual   [address] [place] [address]  Language: English  Hours: Mon - Sun Open 24 Hours   Phone: (776) 548-8427 Email: info@Domain Media.org Website: http://www.[facility]Life [facility].org          Housing       Coordinated Entry access point  9  Adult Shelter Connect (ASC) Distance: 1.17 miles      In-Person, Phone/Virtual   [address] [place] [address]  Language: English, Upper sorbian  Hours: Mon - Fri 10:00 AM - 5:30 PM , Mon - Sun 7:30 PM - 10:20 PM , Sat - Sun 1:00 PM - 5:30 PM  Fees: Free   Phone: (736) 202-4988 Email: info@KitchIn Website: https://www.[employer]ica.org/our-programs/adult-shelter-connect-[facility]/     10  [facility] - Adult custodial Connect [facility] Distance: 1.98 miles      In-Person   [address] [place] [address]  Language: English  Hours: Mon - Sat 10:00 PM - 5:00 PM  Fees: Free   Phone: (266)  781-5315 Email: info@saintolaf.[employer] Website: http://www.[facility]aintNorthern [facility]land Hospital.org/     Drop-in center or day shelter  11  [employer] [employer] [facility] [employer] Distance: 1.17 miles      In-Person   [address] [place] [address]  Language: English, Hmong, Indian, Chinese  Hours: Mon - Thu 8:30 AM - 4:30 PM , Sat - Sun 9:00 AM - 12:00 PM  Fees: Free   Phone: (229) 702-3379 Email: info@Overtime MediaingSumomis.org Website: https://Rocket [employer].org/     12  [employer] [facility] Distance: 2.47 miles      In-Person   [address] [place] [address]  Language: English  Hours: Mon - Fri 12:00 PM - 3:00 PM  Fees: Free   Phone: (547) 661-6813 Email: Animeeple@Funny Or Die.LiB Website: http://Animeeple.[facility]/     Housing search assistance  13  Housing[employer] - Online housing search assistance Distance: 1.01 miles      Phone/Virtual   [address] [address]  Language: English, Hmong, Indian, Chinese  Hours: Mon - Sun Open 24 Hours   Phone: (329) 468-9509 Email: info@housinglink.org Website: http://www.housinglink.org/     14  Affordable Housing Online - https://Exogenesis/ Distance: 2.11 miles      Phone/Virtual   [address] [place] [address]  Language: English  Hours: Mon - Sun Open 24 Hours   Email: info@Nimbus Cloud Apps Website: https://Exogenesis     Shelter for families  15  [facility] Distance: 19.2 miles      In-Person   [address] [place] [address]  Language: English  Hours: Mon - Fri 3:00 PM - 9:00 AM , Sat - Sun Open 24 Hours  Fees: Free   Phone: (593) 154-4857 Ext.1 Website: https://www.saintandEffies.org/2020/07/03/emergency-family-shelter/     Shelter for individuals  16  [facility] Distance: 1.19 miles      In-Person   [address] [place] [address]  Language: English  Hours: Mon - Sun 5:00 PM - 10:00 AM  Fees: Free, Self Pay    Phone: (391) 239-1521 Email: info@[employer] Enterprises.[employer] Website: https://www.[facility].org/locations/higher-ground-shelter/     17  [facility] Distance: 1.44 miles      In-Person   [address] [place] [address]  Language: English  Hours: Mon - Fri 4:00 PM - 9:00 AM  Fees: Free   Phone: (719) 439-7080 Email: luz@[facility] Website: https://[facility].org/[facility]/          Transportation       Free or low-cost transportation  18  [facility] - Bus Passes - Free or low-cost transportation Distance: 1.42 miles      In-Person   [address] [place] [address]  Language: English  Hours: Tue 9:30 AM - 11:30 AM , Thu 9:30 AM - 11:30 AM  Fees: Free   Phone: (473) 916-5658 Email: info@mae.[employer] Website: http://www.Sossee/     19  [facility] Distance: 1.98 miles      In-Person   [address] [place] [address]  Language: English  Hours: Mon - Wed 9:30 AM - 12:00 PM , Mon - Wed 1:00 PM - 2:00 PM Appt. Only  Fees: Free   Phone: (198) 146-7377 Email: info@saintolaMainOne Website: http://www.saintolaMainOne/     Transportation to medical appointments  20  [facility]  [facility] (AIFW) Distance: 6.04 miles      In-Person   [address] [place] [address]  Language: Uzbek, Greek, English, Gujarati, Partha, Pashto, Faroese, Swedish, Lithuanian, Slovak  Hours: Mon - Wed 9:00 AM - 5:00 PM , Thu 12:00 PM - 6:00 PM , Fri 9:00 AM - 5:00 PM , Sun 10:30 AM - 2:00 PM Appt. Only  Fees: Free   Phone: (473) 809-3142 Email: info@[facility]-aifw.org Website: https://www.sewa-aifw.org/     21  [place] Transportation Distance: 6.6 miles      In-Person   [address] [facility] [address] [place] [address]  Language: English  Hours: Mon - Sat 4:00 AM - 6:00 PM  Fees: Insurance, Self Pay   Phone: (725) 205-5139 Email: delighttranstoñoation1@Synack.Nommunity Website:  https://helpmeconnect.web.[facility]./HelpMeConnect/Providers/Delight_Transportation/Transportation/2?returnUrl=%2FHelpMeConnect%2FSearch%2FBasicNeeds%2FTransportation%2FTransportationServices%3Fstart%3D40          Important Numbers & Websites       [facility]   [address].[facility]  Poison Control   (952) 141-1062 [place]po[place].org  Suicide and Crisis Lifeline   [address] [place], [place] [address]line.org  Childhelp [place] Child Abuse Hotline   844.429.9523 Childhelphotline.org  [place] Sexual Assault Hotline   (373) 362-5277 (HOPE) [facility] Runaway Safeline   (469) 547-8890 (RUNAWAY) [place] Healthrunaway.org  Pregnancy & Postpartum Support [place]   Call/text 250-459-8997 Ppsupportmn.org  Substance Abuse National Helpline ([facility]   912-195-HELP (0173) Findtreatment.gov  Emergency Services   916

## 2023-11-18 LAB — HIV1 RNA # PLAS NAA DL=20: NOT DETECTED COPIES/ML

## 2023-11-20 LAB
RPR SER QL: REACTIVE
RPR SER-TITR: ABNORMAL {TITER}

## 2023-11-22 ENCOUNTER — TELEPHONE (OUTPATIENT)
Dept: INFECTIOUS DISEASES | Facility: CLINIC | Age: 23
End: 2023-11-22

## 2023-11-22 NOTE — TELEPHONE ENCOUNTER
[facility] Call [facility]    Phone Message    May a detailed message be left on voicemail: yes     Reason for Call: Other: .     Patient is wanting to get a call back. Patient had called to reschedule the appt with Dr. Hoover at 2:30pm for B20. Patient states he is not feeling well and needs to reschedule. Please review and contact patient to reschedule.     Action Taken: Message routed to:  [facility] ([facility]): ID    Travel Screening: Not Applicable

## 2023-11-26 ENCOUNTER — HEALTH MAINTENANCE LETTER (OUTPATIENT)
Age: 23
End: 2023-11-26

## 2023-12-20 ENCOUNTER — THERAPY VISIT (OUTPATIENT)
Dept: PHYSICAL THERAPY | Facility: CLINIC | Age: 23
End: 2023-12-20
Attending: INTERNAL MEDICINE
Payer: COMMERCIAL

## 2023-12-20 DIAGNOSIS — M54.50 ACUTE BILATERAL LOW BACK PAIN WITHOUT SCIATICA: ICD-10-CM

## 2023-12-20 PROCEDURE — 97161 PT EVAL LOW COMPLEX 20 MIN: CPT | Mod: GP | Performed by: PHYSICAL THERAPIST

## 2023-12-20 PROCEDURE — 97112 NEUROMUSCULAR REEDUCATION: CPT | Mod: GP | Performed by: PHYSICAL THERAPIST

## 2023-12-20 NOTE — PROGRESS NOTES
PHYSICAL THERAPY EVALUATION  Type of Visit: Evaluation    See electronic medical record for Abuse and Falls Screening details.    Subjective       Presenting condition or subjective complaint: New injury.  Injury occured at work and submitted for workcomp. Walking downstairs and slipped on water and landed left side. Denies numbness or tingling lower extremities.  Presently he complains of intermiitent left low back with no radiation. Pain: 5/10 ( achey, shooting) Better: Ice, rest. 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  Hobbies/Interests:      Patient goals for therapy: Presently working without restrictions- , prolonged walking, certain sleeping positions, work tolerance 3 hours and shift is 4 hours    Pain assessment:  see above      Objective   LUMBAR SPINE EVALUATION  PAIN:  see subjective   INTEGUMENTARY (edema, incisions): na  POSTURE: Standing Posture: Lordosis increased, Genu recurvatum  GAIT:   Weightbearing Status:  FWB  Assistive Device(s): None  Gait Deviations: WNL  BALANCE/PROPRIOCEPTION:  na  WEIGHTBEARING ALIGNMENT:  na  NON-WEIGHTBEARING ALIGNMENT:  na   ROM:   (Degrees) Left AROM Left PROM  Right AROM Right PROM   Hip Flexion       Hip Extension       Hip Abduction       Hip Adduction       Hip Internal Rotation       Hip External Rotation       Knee Flexion       Knee Extension       Lumbar Side glide LSB: 50% RSB: 50% with left low back pain     Lumbar Flexion 75%  with (B) hamstring tightness    Lumbar Extension WNL with left low back pain    Pain:   End feel:   PELVIC/SI SCREEN:  na  STRENGTH:  poor lower abdominal tone, faulty abdominal recruitment pattern with compensatory sucking in of upper and lower abdominals, poor (B) gluteus betina contraction, MMT: gluteus medius: 3+/5 B, hip extensors: 4/5 (L), 4+/5 (B)    MYOTOMES: na  DTR S: na   CORD SIGNS:  na  DERMATOMES: na  NEURAL TENSION: na  FLEXIBILITY:  hamstrings:  65%  left and 90%, left greater right quadratus lumborum, left greater than right lumbar erector spinae muscle tightness  LUMBAR/HIP Special Tests:  na    PELVIS/SI SPECIAL TESTS:  na  FUNCTIONAL TESTS: na  PALPATION:  Left quadratus lumborum and   SPINAL SEGMENTAL CONCLUSIONS:  no pain provocation with spring testing L1-L5 spinous processes      Assessment & Plan   CLINICAL IMPRESSIONS  Medical Diagnosis: Acute bilateral low back pain without sciatica    Treatment Diagnosis: Acute bilateral low back pain without sciatica   Impression/Assessment: Patient is a 23 year old male with left low back  complaints.  The following significant findings have been identified: Pain, Decreased ROM/flexibility, Decreased strength, Impaired muscle performance, Decreased activity tolerance, and Impaired posture. These impairments interfere with their ability to perform walking in community and working  as compared to previous level of function.     Clinical Decision Making (Complexity):  Clinical Presentation: Stable/Uncomplicated  Clinical Presentation Rationale: based on medical and personal factors listed in PT evaluation  Clinical Decision Making (Complexity): Low complexity    PLAN OF CARE  Treatment Interventions:  Modalities: Cryotherapy, Hot Pack  Interventions: Manual Therapy, Neuromuscular Re-education, Therapeutic Activity, Therapeutic Exercise, Self-Care/Home Management    Long Term Goals     PT Goal 1  Goal Identifier: work  Goal  Description: work 8 hours, presently working 3 hours  Rationale:  (work 8 hours pain free,)  Target Date: 02/21/24  PT Goal 2  Goal Identifier: walking  Goal Description: 30 minute tolerance 0/10, currently 6-7/10  Rationale:  (pain free walking in the community)  Target Date: 02/21/24      Frequency of Treatment: 1x/week  Duration of Treatment: 6 weeks    Recommended Referrals to Other Professionals:  none needed   Education Assessment:        Risks and benefits of evaluation/treatment have been explained.   Patient/Family/caregiver agrees with Plan of Care.     Evaluation Time:     PT Eval, Low Complexity Minutes (59735): 25       Signing Clinician: Jazmine Olvera, PT      [facility]                                                                                   OUTPATIENT PHYSICAL THERAPY      PLAN OF TREATMENT FOR OUTPATIENT REHABILITATION   Patient's Last Name, First Name, SHOALJesseniaERNESTOJessenia  JuvencioAidan JOLLEY YOB: 2000   Provider's Name   [facility]   Medical Record No.  [id number]     Onset Date: 11/16/23  Start of Care Date: 12/20/23     Medical Diagnosis:  Acute bilateral low back pain without sciatica      PT Treatment Diagnosis:  Acute bilateral low back pain without sciatica Plan of Treatment  Frequency/Duration: 1x/week/ 6 weeks    Certification date from 12/20/23 to 02/21/24         See note for plan of treatment details and functional goals     Jazmine Olvera, PT                         I CERTIFY THE NEED FOR THESE SERVICES FURNISHED UNDER        THIS PLAN OF TREATMENT AND WHILE UNDER MY CARE     (Physician attestation of this document indicates review and certification of the therapy plan).              Referring Provider:  Jacquelyn Rivera    Initial Assessment  See Epic Evaluation- Start of Care Date: 12/20/23

## 2023-12-26 NOTE — PROGRESS NOTES
[facility]    Division of Infectious Diseases and International Medicine    HIV OUTPATIENT VISIT NOTE   Patient:  Aidan Henning, Date of birth 2000, Medical record number [id number]  Date: 12/27/2023  Reason: HIV follow-up       Assessment and Plan     # HIV  - ART: Continue Biktarvy, he is very well controlled  - Medication Adherence discussed  - Opportunistic Infection prophylaxis Not indicated  - Interested in Cabenuva, will have our pharmacist reach out via phone  - Due for repeat HCV screening with next lab draw (just had labs 11/2023 so can defer today)  - Follow-up in 3 months with me    #Superficial burn of the R index finger  Happened today while cooking. On a high risk area but exam is not concerning at this point for significant risk. Advised cleaning area well with soap & water, use APAP or NSAIDs for pain, can use dressings though they are not necessary. Tdap UTD. Provided him with return instructions (increasing pain or losing sensation, swelling, redness, drainage, skin breakdown, etc) and encouraged him to seek care if worsening given the location of the burn.    # Preventative Medicine  Sexually Transmitted Infection Risk and Screening:  Offered - not been active                       Gonorrhea and Chlamydia: - as above                       Syphilis: Serofast in the 1:2 to 1:4 range. Typically trending every 6 months or so, along with other STD screening.    Cancer screening: Performed Anal pap today, will f/u results    Bone Health: Due at age 48    Immunizations:  ? COVID-19: up to date  ? Influenza: up to date  ? mPox - received 1 dose  ? HPV: Received first dose today! Due for the 2nd dose in 1-2 months, 3rd dose at 6 months  ? Hepatitis A: due  ? Hepatitis B:  due  ? Tdap: Received 2022  ? PCV13->23 due  ? Meningococcus: due    Cardiovascular Hay[place]:                        Lipids: Deferred today given age              Blood Pressure: at goal    Renal Health:  Monitor   Creatinine   Date Value Ref Range Status   11/17/2023 1.09 0.67 - 1.17 mg/dL Final   06/10/2021 0.90 0.66 - 1.25 mg/dL Final        Pietro Hoover MD PharmD  Adult Infectious Disease Fellow PGY5  Pager: [phone]       HPI/Subjective     23M who presents as a transfer of care from Dr. Colin for HIV.      At last visit 3/2023, his RPR had slightly changed from 1:2 to 1:4, though no symptoms or new exposures. Continues to have tinnitus and some issues hearing out of his left ear. He had good adherence to his HIV medication, Biktarvy. He had recently started a new job.       He saw a PCP Dr. Rivera 11/2023 for fatigue and back pain. PT referral placed. 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 TSH normal.     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  Was placed on a Zyprexa taper.     Interested in switching to the injection.  He doesn't like Biktarvy because he does not take like taking pills every day and misses a few pills here and there (1-2 a month); he also lost a whole bottle of pills.    He notes he burned his right index finger while cooking on the stove today. It is a bit painful though he has full sensation and ROM of the finger.           HIV Labs and History:     HIV  ? Diagnosed 6/10/2021- Viral load 6k CD4 600s                 Started on Biktarvy  08/2021  ? After 4 months of treatment- Viral load 0 CD4 808 (12/22/21)  ? Suboptimal adherence summer 2022- Viral load 741, CD4 699 10/31/22  ----------  Labs  Toxo IgG-ve, [id number] -ve  Quantiferon negative 3/2023.  CMV+     Prior OI  None     Co-morbid Infections  Hep B: low titre as as of 6/10/21  Hep C: neg 2021     Other STD  Syphilis: Previously treated in 2021 - 3x IM inj PCN G (6/2021, 8/5 and 11/2021)  RPR trend: 6/2021 1:32, then treated. After tx: 12/2021: 1:8. Has since bounced between 1:2 and 1:4 since 10/2022. 11/17/2023 1:2.     Gonococcal and Chlamydia  (throat, rectal 10/31/2022) tx in 12/2022 (CTX and doxy tx). Repeat gonorrhea throat swab neg.    LABS  Absolute CD4   Date Value Ref Range Status   06/10/2021 606 441 - 2,156 cells/uL Final     Absolute CD4, [place] T Cells   Date Value Ref Range Status   11/17/2023 1,338 441 - 2,156 cells/uL Final     HIV-1 RNA Quant Result   Date Value Ref Range Status   06/10/2021 6,123 (A) HIVND^HIV-1 RNA Not Detected [Copies]/mL Final     Comment:     The RYAN AmpliPrep/RYAN TaqMan HIV-1 test is an FDA-approved in vitro   nucleic acid amplification test for the quantitation of HIV-1 RNA in human   plasma (EDTA plasma) using the RYAN AmpliPrep instrument for automated viral   nucleic acid extraction and the [employer] TaqMan Analyzer or Op[employer]ier TaqMan for   automated Real Time PCR amplification and detection of the viral nucleic acid   target.  Titer results are reported in copies/ml. This assay is intended for use in   conjunction with clinical presentation and other laboratory markers of disease   prognosis and for use as an aid in assessing viral response to antiretroviral   treatment as measured by changes in plasma HIV-1 RNA levels. This test should   not be used as a donor screening test to confirm the presence of HIV-1   infection.       HIV-1 RNA copies/mL   Date Value Ref Range Status   11/17/2023 Not Detected Not Detected Copies/mL Final   03/06/2023 <20 (A) Not Detected Copies/mL Final     Treponema Antibodies   Date Value Ref Range Status   06/10/2021 Reactive (A) NR^Nonreactive Final     Comment:     The Anti-Treponema Antibody screening tends to remain positive for life,   therefore it does not distinguish between active and past syphilis infections.   All positive and equivocal results will automatically reflex to a   non-specific Rapid Plasma Reagin (RPR) test.  If the Treponema Antibody is positive, and the RPR is positive, this is   presumptive evidence of current infection, inadequately treated infection,    persistent infection or reinfection.  If the Anti-Treponema Antibody is positive and the RPR is negative, then a   second Treponema specific test (TP-PA) will be performed to determine whether   the antibody test is falsely positive or is detecting early infection.  If the   latter is suspected, repeat testing in approximately two weeks is   recommended.  Methodology Change: Test performed on the [employer] Liaison XL by Treponema   pallidum Total Antibodies Assay as of 3.17.2020.       Treponema Antibody Total   Date Value Ref Range Status   03/15/2023 Reactive (A) Nonreactive Final     Comment:     The Anti-Treponema Antibody screening tends to remain positive for life, therefore it does not distinguish between active and past syphilis infections. All positive and equivocal results will automatically reflex to a non-specific Rapid Plasma Reagin (RPR) test.    If the Treponema Antibody is positive, and the RPR is positive, this is presumptive evidence of current infection, inadequately treated infection, persistent infection or reinfection.    If the Anti-Treponema Antibody is positive and the RPR is negative, then a second Treponema specific test (TP-PA) will be performed to determine whether the antibody test is falsely positive or is detecting early infection.  If the latter is suspected, repeat testing in approximately two weeks is recommended.     Hep B Surface Agn   Date Value Ref Range Status   06/10/2021 Nonreactive NR^Nonreactive Final             Other Medical History:     Attempt was made to collect past, family and social history during this encounter,  this information was reviewed with the patient and updated    Allergies:  Patient has no known allergies.    Past Medical History  No past medical history on file. PastSurgical History   has no past surgical history on file.   Family History  No family history on file. Social History  He reports that he has been smoking. He has never used smokeless  tobacco. He reports current alcohol use. He reports current drug use. Drug: Marijuana.    He is a  at [employer] and also a  at [employer].   Notable Exposures  None Vaccination History:  Immunization History   Administered Date(s) Administered     COVID-19 12+ (2023-24) (Pfizer) 11/17/2023     COVID-19 Bivalent 12+ (Pfizer) 10/31/2022     COVID-19 Monovalent 18+ (Moderna) 08/11/2021, 12/22/2021     Comvax (HIB/HepB) 10/01/2001     DTAP (<7y) 1998, 10/01/2001, 03/13/2003, 12/03/2003     Hepatitis B, Peds 1998, 2000     Historic Hib Prohibit 1998     Influenza Vaccine >6 months,quad, PF 11/17/2023     MMR 10/01/2001     Pneumococcal (PCV 7) 10/01/2001     Poliovirus, inactivated (IPV) 1998, 10/01/2001, 12/03/2003     Smallpox/Mpox Vaccine Subcutaneous (JYNNEOS) 10/31/2022     TDAP (Adacel,Boostrix) 11/02/2011, 03/19/2022     Varicella 11/02/2011             Physical Exam:     VITAL SIGNS:  /84 (BP Location: Right arm, Patient Position: Sitting, Cuff Size: Adult Large)   Pulse 67   Temp 98.4  F (36.9  C) (Oral)   Wt 101.6 kg (224 lb)   SpO2 99%   BMI 38.43 kg/m      PHYSICAL EXAM:  Eyes:     no ptosis, no discharge, no scleral icterus  Mouth, Throat:     mucous membranes moist, pharynx normal without lesions  Musculoskeletal:     no elbow wrist knee or ankle deformity or swelling. Full ROM of right index finger.  Skin:     Exposed skin is dry without rash or ulcer except for R index finger with ~5x5mm area of 1st degree burn, no other area of burn. The skin is fully intact.  Rectal: Normal tone. No fissure, hemorrhoid, or lesion besides a very small skin tag at 12 o'clock.  Neurologic:     Higher Mental Function: Conversant, AOx4   Motor: Ambulatory   Sensory: crude touch intact in all 4 limbs, full sensation intact on R index finger  Psychiatric:     appropriate        Signature:     Pietro Hoover MD   PGY-V Infectious Disease Fellow

## 2023-12-27 ENCOUNTER — OFFICE VISIT (OUTPATIENT)
Dept: INFECTIOUS DISEASES | Facility: CLINIC | Age: 23
End: 2023-12-27
Attending: FAMILY MEDICINE
Payer: COMMERCIAL

## 2023-12-27 VITALS
HEART RATE: 67 BPM | DIASTOLIC BLOOD PRESSURE: 84 MMHG | TEMPERATURE: 98.4 F | WEIGHT: 224 LBS | OXYGEN SATURATION: 99 % | BODY MASS INDEX: 38.43 KG/M2 | SYSTOLIC BLOOD PRESSURE: 124 MMHG

## 2023-12-27 DIAGNOSIS — Z21 HUMAN IMMUNODEFICIENCY VIRUS I INFECTION (H): Primary | ICD-10-CM

## 2023-12-27 DIAGNOSIS — Z23 NEED FOR VACCINATION: ICD-10-CM

## 2023-12-27 DIAGNOSIS — M54.50 ACUTE LOW BACK PAIN WITHOUT SCIATICA: Primary | ICD-10-CM

## 2023-12-27 DIAGNOSIS — T23.121A: ICD-10-CM

## 2023-12-27 LAB
LAB DIRECTOR COMMENTS: ABNORMAL
LAB DIRECTOR DISCLAIMER: ABNORMAL
LAB DIRECTOR INTERPRETATION: ABNORMAL
LAB DIRECTOR METHODOLOGY: ABNORMAL
LAB DIRECTOR RESULTS: ABNORMAL
SPECIMEN DESCRIPTION: ABNORMAL

## 2023-12-27 PROCEDURE — 90471 IMMUNIZATION ADMIN: CPT | Performed by: STUDENT IN AN ORGANIZED HEALTH CARE EDUCATION/TRAINING PROGRAM

## 2023-12-27 PROCEDURE — 99214 OFFICE O/P EST MOD 30 MIN: CPT | Mod: GC | Performed by: STUDENT IN AN ORGANIZED HEALTH CARE EDUCATION/TRAINING PROGRAM

## 2023-12-27 PROCEDURE — 87624 HPV HI-RISK TYP POOLED RSLT: CPT | Performed by: STUDENT IN AN ORGANIZED HEALTH CARE EDUCATION/TRAINING PROGRAM

## 2023-12-27 PROCEDURE — G0463 HOSPITAL OUTPT CLINIC VISIT: HCPCS | Performed by: STUDENT IN AN ORGANIZED HEALTH CARE EDUCATION/TRAINING PROGRAM

## 2023-12-27 PROCEDURE — 250N000021 HC RX MED A9270 GY (STAT IND- M) 250: Performed by: STUDENT IN AN ORGANIZED HEALTH CARE EDUCATION/TRAINING PROGRAM

## 2023-12-27 PROCEDURE — 88112 CYTOPATH CELL ENHANCE TECH: CPT | Mod: 26 | Performed by: PATHOLOGY

## 2023-12-27 PROCEDURE — G0463 HOSPITAL OUTPT CLINIC VISIT: HCPCS | Mod: 25 | Performed by: STUDENT IN AN ORGANIZED HEALTH CARE EDUCATION/TRAINING PROGRAM

## 2023-12-27 PROCEDURE — 88112 CYTOPATH CELL ENHANCE TECH: CPT | Mod: TC | Performed by: STUDENT IN AN ORGANIZED HEALTH CARE EDUCATION/TRAINING PROGRAM

## 2023-12-27 PROCEDURE — G0452 MOLECULAR PATHOLOGY INTERPR: HCPCS | Mod: 26 | Performed by: PATHOLOGY

## 2023-12-27 PROCEDURE — 90651 9VHPV VACCINE 2/3 DOSE IM: CPT | Performed by: STUDENT IN AN ORGANIZED HEALTH CARE EDUCATION/TRAINING PROGRAM

## 2023-12-27 RX ADMIN — HUMAN PAPILLOMAVIRUS 9-VALENT VACCINE, RECOMBINANT 0.5 ML: 30; 40; 60; 40; 20; 20; 20; 20; 20 INJECTION, SUSPENSION INTRAMUSCULAR at 16:55

## 2023-12-27 ASSESSMENT — PAIN SCALES - GENERAL: PAINLEVEL: NO PAIN (0)

## 2023-12-27 NOTE — LETTER
12/27/2023      RE: Aidan Henning  [address]   [address]  [facility] [address]        [facility]    Division of Infectious Diseases and International Medicine    HIV OUTPATIENT VISIT NOTE   Patient:  Aidan Henning, Date of birth 2000, Medical record number [id number]  Date: 12/27/2023  Reason: HIV follow-up       Assessment and Plan     # HIV  - ART: Continue Biktarvy, he is very well controlled  - Medication Adherence discussed  - Opportunistic Infection prophylaxis Not indicated  - Interested in Cabenuva, will have our pharmacist reach out via phone  - Due for repeat HCV screening with next lab draw (just had labs 11/2023 so can defer today)  - Follow-up in 3 months with me    #Superficial burn of the R index finger  Happened today while cooking. On a high risk area but exam is not concerning at this point for significant risk. Advised cleaning area well with soap & water, use APAP or NSAIDs for pain, can use dressings though they are not necessary. Tdap UTD. Provided him with return instructions (increasing pain or losing sensation, swelling, redness, drainage, skin breakdown, etc) and encouraged him to seek care if worsening given the location of the burn.    # Preventative Medicine  Sexually Transmitted Infection Risk and Screening:  Offered - not been active                       Gonorrhea and Chlamydia: - as above                       Syphilis: Serofast in the 1:2 to 1:4 range. Typically trending every 6 months or so, along with other STD screening.    Cancer screening: Performed Anal pap today, will f/u results    Bone Health: Due at age 48    Immunizations:  COVID-19: up to date  Influenza: up to date  mPox - received 1 dose  HPV: Received first dose today! Due for the 2nd dose in 1-2 months, 3rd dose at 6 months  Hepatitis A: due  Hepatitis B:  due  Tdap: Received 2022  PCV13->23 due  Meningococcus: due    Cardiovascular Hayden:                        Lipids:  Deferred today given age              Blood Pressure: at goal    Renal Health: Monitor   Creatinine   Date Value Ref Range Status   11/17/2023 1.09 0.67 - 1.17 mg/dL Final   06/10/2021 0.90 0.66 - 1.25 mg/dL Final        Pietro Hoover MD PharmD  Adult Infectious Disease Fellow PGY5  Pager: [phone]       HPI/Subjective     23M who presents as a transfer of care from Dr. Colin for HIV.      At last visit 3/2023, his RPR had slightly changed from 1:2 to 1:4, though no symptoms or new exposures. Continues to have tinnitus and some issues hearing out of his left ear. He had good adherence to his HIV medication, Biktarvy. He had recently started a new job.       He saw a PCP Dr. Rivera 11/2023 for fatigue and back pain. PT referral placed. 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 TSH normal.     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  Was placed on a Zyprexa taper.     Interested in switching to the injection.  He doesn't like Biktarvy because he does not take like taking pills every day and misses a few pills here and there (1-2 a month); he also lost a whole bottle of pills.    He notes he burned his right index finger while cooking on the stove today. It is a bit painful though he has full sensation and ROM of the finger.           HIV Labs and History:     HIV  Diagnosed 6/10/2021- Viral load 6k CD4 600s                 Started on Biktarvy  08/2021  After 4 months of treatment- Viral load 0 CD4 808 (12/22/21)  Suboptimal adherence summer 2022- Viral load 741, CD4 699 10/31/22  ----------  Labs  Toxo IgG-ve, [id number] -ve  Quantiferon negative 3/2023.  CMV+     Prior OI  None     Co-morbid Infections  Hep B: low titre as as of 6/10/21  Hep C: neg 2021     Other STD  Syphilis: Previously treated in 2021 - 3x IM inj PCN G (6/2021, 8/5 and 11/2021)  RPR trend: 6/2021 1:32, then treated. After tx: 12/2021: 1:8. Has since bounced between  1:2 and 1:4 since 10/2022. 11/17/2023 1:2.     Gonococcal and Chlamydia (throat, rectal 10/31/2022) tx in 12/2022 (CTX and doxy tx). Repeat gonorrhea throat swab neg.    LABS  Absolute CD4   Date Value Ref Range Status   06/10/2021 606 441 - 2,156 cells/uL Final     Absolute CD4, [place] T Cells   Date Value Ref Range Status   11/17/2023 1,338 441 - 2,156 cells/uL Final     HIV-1 RNA Quant Result   Date Value Ref Range Status   06/10/2021 6,123 (A) HIVND^HIV-1 RNA Not Detected [Copies]/mL Final     Comment:     The RYAN AmpliPrep/RYAN TaqMan HIV-1 test is an FDA-approved in vitro   nucleic acid amplification test for the quantitation of HIV-1 RNA in human   plasma (EDTA plasma) using the RYAN AmpliPrep instrument for automated viral   nucleic acid extraction and the [employer] TaqMan Analyzer or [employer] TaqMan for   automated Real Time PCR amplification and detection of the viral nucleic acid   target.  Titer results are reported in copies/ml. This assay is intended for use in   conjunction with clinical presentation and other laboratory markers of disease   prognosis and for use as an aid in assessing viral response to antiretroviral   treatment as measured by changes in plasma HIV-1 RNA levels. This test should   not be used as a donor screening test to confirm the presence of HIV-1   infection.       HIV-1 RNA copies/mL   Date Value Ref Range Status   11/17/2023 Not Detected Not Detected Copies/mL Final   03/06/2023 <20 (A) Not Detected Copies/mL Final     Treponema Antibodies   Date Value Ref Range Status   06/10/2021 Reactive (A) NR^Nonreactive Final     Comment:     The Anti-Treponema Antibody screening tends to remain positive for life,   therefore it does not distinguish between active and past syphilis infections.   All positive and equivocal results will automatically reflex to a   non-specific Rapid Plasma Reagin (RPR) test.  If the Treponema Antibody is positive, and the RPR is positive, this is   presumptive  evidence of current infection, inadequately treated infection,   persistent infection or reinfection.  If the Anti-Treponema Antibody is positive and the RPR is negative, then a   second Treponema specific test (TP-PA) will be performed to determine whether   the antibody test is falsely positive or is detecting early infection.  If the   latter is suspected, repeat testing in approximately two weeks is   recommended.  Methodology Change: Test performed on the [employer] Liaison XL by Treponema   pallidum Total Antibodies Assay as of 3.17.2020.       Treponema Antibody Total   Date Value Ref Range Status   03/15/2023 Reactive (A) Nonreactive Final     Comment:     The Anti-Treponema Antibody screening tends to remain positive for life, therefore it does not distinguish between active and past syphilis infections. All positive and equivocal results will automatically reflex to a non-specific Rapid Plasma Reagin (RPR) test.    If the Treponema Antibody is positive, and the RPR is positive, this is presumptive evidence of current infection, inadequately treated infection, persistent infection or reinfection.    If the Anti-Treponema Antibody is positive and the RPR is negative, then a second Treponema specific test (TP-PA) will be performed to determine whether the antibody test is falsely positive or is detecting early infection.  If the latter is suspected, repeat testing in approximately two weeks is recommended.     Hep B Surface Agn   Date Value Ref Range Status   06/10/2021 Nonreactive NR^Nonreactive Final             Other Medical History:     Attempt was made to collect past, family and social history during this encounter,  this information was reviewed with the patient and updated    Allergies:  Patient has no known allergies.    Past Medical History  No past medical history on file. PastSurgical History   has no past surgical history on file.   Family History  No family history on file. Social History  He  reports that he has been smoking. He has never used smokeless tobacco. He reports current alcohol use. He reports current drug use. Drug: Marijuana.    He is a  at [employer] and also a  at [employer].   Notable Exposures  None Vaccination History:  Immunization History   Administered Date(s) Administered     COVID-19 12+ (2023-24) (Pfizer) 11/17/2023     COVID-19 Bivalent 12+ (Pfizer) 10/31/2022     COVID-19 Monovalent 18+ (Moderna) 08/11/2021, 12/22/2021     Comvax (HIB/HepB) 10/01/2001     DTAP (<7y) 1998, 10/01/2001, 03/13/2003, 12/03/2003     Hepatitis B, Peds 1998, 2000     Historic Hib Prohibit 1998     Influenza Vaccine >6 months,quad, PF 11/17/2023     MMR 10/01/2001     Pneumococcal (PCV 7) 10/01/2001     Poliovirus, inactivated (IPV) 1998, 10/01/2001, 12/03/2003     Smallpox/Mpox Vaccine Subcutaneous (JYNNEOS) 10/31/2022     TDAP (Adacel,Boostrix) 11/02/2011, 03/19/2022     Varicella 11/02/2011             Physical Exam:     VITAL SIGNS:  /84 (BP Location: Right arm, Patient Position: Sitting, Cuff Size: Adult Large)   Pulse 67   Temp 98.4  F (36.9  C) (Oral)   Wt 101.6 kg (224 lb)   SpO2 99%   BMI 38.43 kg/m      PHYSICAL EXAM:  Eyes:     no ptosis, no discharge, no scleral icterus  Mouth, Throat:     mucous membranes moist, pharynx normal without lesions  Musculoskeletal:     no elbow wrist knee or ankle deformity or swelling. Full ROM of right index finger.  Skin:     Exposed skin is dry without rash or ulcer except for R index finger with ~5x5mm area of 1st degree burn, no other area of burn. The skin is fully intact.  Rectal: Normal tone. No fissure, hemorrhoid, or lesion besides a very small skin tag at 12 o'clock.  Neurologic:     Higher Mental Function: Conversant, AOx4   Motor: Ambulatory   Sensory: crude touch intact in all 4 limbs, full sensation intact on R index finger  Psychiatric:     appropriate        Signature:     Pietro  MD Sneha   PGY-V Infectious Disease Fellow    [facility] Staff Note: Mr. Henning was seen, examined, and the case was discussed with Dr. Hoover, ID Fellow -- I agree with his interval history and examination, assessment and plan in this outpatient ID / HIV Progress note. This note reflects my observations and opinions and the plan outlined fully reflects my approach. I have reviewed the available history, radiology, laboratory results, and reports with the Fellow.    Pietro Hoover MD

## 2023-12-27 NOTE — NURSING NOTE
Chief Complaint   Patient presents with    RECHECK     B2 follow up     /84 (BP Location: Right arm, Patient Position: Sitting, Cuff Size: Adult Large)   Pulse 67   Temp 98.4  F (36.9  C) (Oral)   Wt 101.6 kg (224 lb)   SpO2 99%   BMI 38.43 kg/m    Jose Saldivar CMA on 12/27/2023 at 3:44 PM

## 2023-12-27 NOTE — LETTER
12/27/2023       RE: Aidan Henning  [address]   [address]  [facility] [address]     Dear Colleague,    Thank you for referring your patient, Aidan Henning, to the [facility]ECT[facility] DIS[facility] [place] at [facility]. Please see a copy of my visit note below.     [facility]    Division of Infectious Diseases and International Medicine    HIV OUTPATIENT VISIT NOTE   Patient:  Aidan Henning, Date of birth 2000, Medical record number [id number]  Date: 12/27/2023  Reason: HIV follow-up       Assessment and Plan     # HIV  - ART: Continue Biktarvy, he is very well controlled  - Medication Adherence discussed  - Opportunistic Infection prophylaxis Not indicated  - Interested in Cabenuva, will have our pharmacist reach out via phone  - Due for repeat HCV screening with next lab draw (just had labs 11/2023 so can defer today)  - Follow-up in 3 months with me    #Superficial burn of the R index finger  Happened today while cooking. On a high risk area but exam is not concerning at this point for significant risk. Advised cleaning area well with soap & water, use APAP or NSAIDs for pain, can use dressings though they are not necessary. Tdap UTD. Provided him with return instructions (increasing pain or losing sensation, swelling, redness, drainage, skin breakdown, etc) and encouraged him to seek care if worsening given the location of the burn.    # Preventative Medicine  Sexually Transmitted Infection Risk and Screening:  Offered - not been active                       Gonorrhea and Chlamydia: - as above                       Syphilis: Serofast in the 1:2 to 1:4 range. Typically trending every 6 months or so, along with other STD screening.    Cancer screening: Performed Anal pap today, will f/u results    Bone Health: Due at age 48    Immunizations:  COVID-19: up to date  Influenza: up to date  mPox - received 1  dose  HPV: Received first dose today! Due for the 2nd dose in 1-2 months, 3rd dose at 6 months  Hepatitis A: due  Hepatitis B:  due  Tdap: Received 2022  PCV13->23 due  Meningococcus: due    Cardiovascular [place]:                        Lipids: Deferred today given age              Blood Pressure: at goal    Renal Health: Monitor   Creatinine   Date Value Ref Range Status   11/17/2023 1.09 0.67 - 1.17 mg/dL Final   06/10/2021 0.90 0.66 - 1.25 mg/dL Final        Pietro Hoover MD PharmD  Adult Infectious Disease Fellow PGY5  Pager: [phone]       HPI/Subjective     23M who presents as a transfer of care from Dr. Colin for HIV.      At last visit 3/2023, his RPR had slightly changed from 1:2 to 1:4, though no symptoms or new exposures. Continues to have tinnitus and some issues hearing out of his left ear. He had good adherence to his HIV medication, Biktarvy. He had recently started a new job.       He saw a PCP Dr. Rivera 11/2023 for fatigue and back pain. PT referral placed. 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 TSH normal.     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  Was placed on a Zyprexa taper.     Interested in switching to the injection.  He doesn't like Biktarvy because he does not take like taking pills every day and misses a few pills here and there (1-2 a month); he also lost a whole bottle of pills.    He notes he burned his right index finger while cooking on the stove today. It is a bit painful though he has full sensation and ROM of the finger.           HIV Labs and History:     HIV  Diagnosed 6/10/2021- Viral load 6k CD4 600s                 Started on Biktarvy  08/2021  After 4 months of treatment- Viral load 0 CD4 808 (12/22/21)  Suboptimal adherence summer 2022- Viral load 741, CD4 699 10/31/22  ----------  Labs  Toxo IgG-ve, [id number] -ve  Quantiferon negative 3/2023.  CMV+     Prior OI  None     Co-morbid  Infections  Hep B: low titre as as of 6/10/21  Hep C: neg 2021     Other STD  Syphilis: Previously treated in 2021 - 3x IM inj PCN G (6/2021, 8/5 and 11/2021)  RPR trend: 6/2021 1:32, then treated. After tx: 12/2021: 1:8. Has since bounced between 1:2 and 1:4 since 10/2022. 11/17/2023 1:2.     Gonococcal and Chlamydia (throat, rectal 10/31/2022) tx in 12/2022 (CTX and doxy tx). Repeat gonorrhea throat swab neg.    LABS  Absolute CD4   Date Value Ref Range Status   06/10/2021 606 441 - 2,156 cells/uL Final     Absolute CD4, [place] T Cells   Date Value Ref Range Status   11/17/2023 1,338 441 - 2,156 cells/uL Final     HIV-1 RNA Quant Result   Date Value Ref Range Status   06/10/2021 6,123 (A) HIVND^HIV-1 RNA Not Detected [Copies]/mL Final     Comment:     The RYAN AmpliPrep/RYAN TaqMan HIV-1 test is an FDA-approved in vitro   nucleic acid amplification test for the quantitation of HIV-1 RNA in human   plasma (EDTA plasma) using the RYAN AmpliPrep instrument for automated viral   nucleic acid extraction and the RYAN TaqMan Analyzer or Readbug TaqMan for   automated Real Time PCR amplification and detection of the viral nucleic acid   target.  Titer results are reported in copies/ml. This assay is intended for use in   conjunction with clinical presentation and other laboratory markers of disease   prognosis and for use as an aid in assessing viral response to antiretroviral   treatment as measured by changes in plasma HIV-1 RNA levels. This test should   not be used as a donor screening test to confirm the presence of HIV-1   infection.       HIV-1 RNA copies/mL   Date Value Ref Range Status   11/17/2023 Not Detected Not Detected Copies/mL Final   03/06/2023 <20 (A) Not Detected Copies/mL Final     Treponema Antibodies   Date Value Ref Range Status   06/10/2021 Reactive (A) NR^Nonreactive Final     Comment:     The Anti-Treponema Antibody screening tends to remain positive for life,   therefore it does not  distinguish between active and past syphilis infections.   All positive and equivocal results will automatically reflex to a   non-specific Rapid Plasma Reagin (RPR) test.  If the Treponema Antibody is positive, and the RPR is positive, this is   presumptive evidence of current infection, inadequately treated infection,   persistent infection or reinfection.  If the Anti-Treponema Antibody is positive and the RPR is negative, then a   second Treponema specific test (TP-PA) will be performed to determine whether   the antibody test is falsely positive or is detecting early infection.  If the   latter is suspected, repeat testing in approximately two weeks is   recommended.  Methodology Change: Test performed on the zuuka! Liaison XL by Treponema   pallidum Total Antibodies Assay as of 3.17.2020.       Treponema Antibody Total   Date Value Ref Range Status   03/15/2023 Reactive (A) Nonreactive Final     Comment:     The Anti-Treponema Antibody screening tends to remain positive for life, therefore it does not distinguish between active and past syphilis infections. All positive and equivocal results will automatically reflex to a non-specific Rapid Plasma Reagin (RPR) test.    If the Treponema Antibody is positive, and the RPR is positive, this is presumptive evidence of current infection, inadequately treated infection, persistent infection or reinfection.    If the Anti-Treponema Antibody is positive and the RPR is negative, then a second Treponema specific test (TP-PA) will be performed to determine whether the antibody test is falsely positive or is detecting early infection.  If the latter is suspected, repeat testing in approximately two weeks is recommended.     Hep B Surface Agn   Date Value Ref Range Status   06/10/2021 Nonreactive NR^Nonreactive Final             Other Medical History:     Attempt was made to collect past, family and social history during this encounter,  this information was reviewed with  the patient and updated    Allergies:  Patient has no known allergies.    Past Medical History  No past medical history on file. PastSurgical History   has no past surgical history on file.   Family History  No family history on file. Social History  He reports that he has been smoking. He has never used smokeless tobacco. He reports current alcohol use. He reports current drug use. Drug: Marijuana.    He is a  at [employer] and also a  at [employer].   Notable Exposures  None Vaccination History:  Immunization History   Administered Date(s) Administered     COVID-19 12+ (2023-24) (Pfizer) 11/17/2023     COVID-19 Bivalent 12+ (Pfizer) 10/31/2022     COVID-19 Monovalent 18+ (Moderna) 08/11/2021, 12/22/2021     Comvax (HIB/HepB) 10/01/2001     DTAP (<7y) 1998, 10/01/2001, 03/13/2003, 12/03/2003     Hepatitis B, Peds 2000, 2000     Historic Hib Prohibit 1998     Influenza Vaccine >6 months,quad, PF 11/17/2023     MMR 10/01/2001     Pneumococcal (PCV 7) 10/01/2001     Poliovirus, inactivated (IPV) 1998, 10/01/2001, 12/03/2003     Smallpox/Mpox Vaccine Subcutaneous (JYNNEOS) 10/31/2022     TDAP (Adacel,Boostrix) 11/02/2011, 03/19/2022     Varicella 11/02/2011             Physical Exam:     VITAL SIGNS:  /84 (BP Location: Right arm, Patient Position: Sitting, Cuff Size: Adult Large)   Pulse 67   Temp 98.4  F (36.9  C) (Oral)   Wt 101.6 kg (224 lb)   SpO2 99%   BMI 38.43 kg/m      PHYSICAL EXAM:  Eyes:     no ptosis, no discharge, no scleral icterus  Mouth, Throat:     mucous membranes moist, pharynx normal without lesions  Musculoskeletal:     no elbow wrist knee or ankle deformity or swelling. Full ROM of right index finger.  Skin:     Exposed skin is dry without rash or ulcer except for R index finger with ~5x5mm area of 1st degree burn, no other area of burn. The skin is fully intact.  Rectal: Normal tone. No fissure, hemorrhoid, or lesion besides a very small  skin tag at 12 o'clock.  Neurologic:     Higher Mental Function: Conversant, AOx4   Motor: Ambulatory   Sensory: crude touch intact in all 4 limbs, full sensation intact on R index finger  Psychiatric:     appropriate        Signature:     Pietro Hoover MD   PGY-V Infectious Disease Fellow      Again, thank you for allowing me to participate in the care of your patient.      Sincerely,    Pietro Hoover MD

## 2023-12-29 LAB
PATH REPORT.COMMENTS IMP SPEC: NORMAL
PATH REPORT.FINAL DX SPEC: NORMAL
PATH REPORT.GROSS SPEC: NORMAL
PATH REPORT.MICROSCOPIC SPEC OTHER STN: NORMAL
PATH REPORT.RELEVANT HX SPEC: NORMAL

## 2023-12-29 NOTE — PROGRESS NOTES
[facility] Staff Note: Mr. Henning was seen, examined, and the case was discussed with Dr. Hoover, ID Fellow -- I agree with his interval history and examination, assessment and plan in this outpatient ID / HIV Progress note. This note reflects my observations and opinions and the plan outlined fully reflects my approach. I have reviewed the available history, radiology, laboratory results, and reports with the Fellow.

## 2023-12-31 NOTE — TELEPHONE ENCOUNTER
DIAGNOSIS: Low Back Pain   APPOINTMENT DATE: 01/05/2024   NOTES STATUS DETAILS   OFFICE NOTE from referring provider Internal [id number] - Jacquelyn Rivera MD - [facility] Internal Med   MEDICATION LIST Internal

## 2024-01-02 ENCOUNTER — OFFICE VISIT (OUTPATIENT)
Dept: PHARMACY | Facility: CLINIC | Age: 24
End: 2024-01-02
Payer: COMMERCIAL

## 2024-01-02 DIAGNOSIS — Z21 HUMAN IMMUNODEFICIENCY VIRUS I INFECTION (H): Primary | ICD-10-CM

## 2024-01-02 PROCEDURE — 99605 MTMS BY PHARM NP 15 MIN: CPT | Performed by: PHARMACIST

## 2024-01-02 PROCEDURE — 99607 MTMS BY PHARM ADDL 15 MIN: CPT | Performed by: PHARMACIST

## 2024-01-02 NOTE — Clinical Note
Hi Dr. Rivera, just a heads up that Aidan is interested in re-starting low-dose olanzapine and a counselor. I told him to discuss more with you at your upcoming appointment. Thank you, Lucille Eldridge, PharmD, [facility] Medication Therapy Management Pharmacist  [phone]

## 2024-01-02 NOTE — PROGRESS NOTES
Medication Therapy Management (MTM) Encounter    ASSESSMENT:                            Medication Adherence/Access: No issues identified    HIV:  Reviewed the following with the patient:  Cabenuva initiation process   Cabenuva dosing and monitoring schedule    Side effect potential (hypersensitivity reaction, injection site pain, pyrexia, fatigue, headache, nausea, insomnia, rash)  Missed injection management (7 day window for injections)  Two strike policy for missing injections >7 days    Patient is a candidate for Cabenuva based on the following criteria:   Treatment naive: No  HIV RNA <50 copies for 3 or more monthsYes  Known or suspected resistance to cabotegravir or rilpivirine: No  History of treatment failure: No  Taking carbamazepine, oxcarbazepine, phenobarbital, phenytoin, dexamethasone, Bahman's Wort, rifabutin, rifampin, or rifapentine: No    Other considerations:   Immune to hepatitis B or negative HBsAg: Yes - could repeat Heplisav series or Twinrix since not immune to hepA  No show rate (>1 missed appointment in the last 6-12 months): Yes  Reviewed when the clinic is open for injections and confirmed it will work with the patient s schedule (ID dept at the [facility] M-Fri 9AM-3:30 PM) Yes    After reviewing the above information, patient is still interested in proceeding with Cabenuva. He has had a few no shows in the last few months. Discussed importance of strict adherence to be successful on Cabenuva. He reports he will be able to take off work to come to appts if he has a doctor's note.       PLAN:                            Patient prefers to complete oral lead-in? No  Patient interested in Bi-monthly injections  Does patient fill at [facility] pharmacy or  specialty pharmacy? Yes  Will enter therapy plan to investigate insurance coverage of Cabenuva   Discuss vaccines at next appt    Follow-up: 2 weeks after first injection if Cabenuva is started    SUBJECTIVE/OBJECTIVE:                           Aidan Henning is a 23 year old male coming in for an initial visit. He was referred to me from Dr. Hoover.      Reason for visit: Cabenuva.    Allergies/ADRs: Reviewed in chart  Past Medical History: Reviewed in chart  Tobacco: He reports that he has been smoking cigarettes. He has been smoking an average of 1 pack per day. He has never used smokeless tobacco.Nicotine/Tobacco Cessation Plan:   4-5 per day if he gets them from someone; not currently buying  Alcohol: 2-4 shots a few days per week socially    Medication Adherence/Access: no issues reported    HIV:   Biktarvy once daily - tolerating well. Interested in switching to Cabenuva to reduce pill burden. No other medications. He's interested in re-starting low-dose olanzapine for mood. Also interested in a counselor.   Diagnosis: 2021  Past regimens: none  Phenotype: 10/31/22    NRTI: none   NNRTI: I178M (no resistance predicted)   PI: E35D, L63T, A71V (no resistance predicted)  CD4: 1,338 on 11/17/23  Treponema Ab: Previously treated in 2021 - 3x IM inj PCN G (6/2021, 8/5 and 11/2021). RPR trend: 6/2021 1:32, then treated. After tx: 12/2021: 1:8. Has since bounced between 1:2 and 1:4 since 10/2022. 11/17/2023 1:2.   Immunizations:due for PCV20, hepB, hepA, monkeypox 2/2, Menquadfi     Component      Latest Ref Rng 3/6/2023  12:04 PM 11/17/2023  9:11 AM   HIV-1 RNA Quant Result      Not Detected Copies/mL <20 !  Not Detected       Cabenuva:   At least two communication methods documented in Epic Yes  Transportation barriers No -uses train/bus       Today's Vitals: There were no vitals taken for this visit.  ----------------    I spent 20 minutes with this patient today. All changes were made via collaborative practice agreement with Dr. Hoover. A copy of the visit note was provided to the patient's provider(s).    A summary of these recommendations was sent via [employer].     Medication Therapy Recommendations  Human immunodeficiency virus I infection (H)     Current Medication: bictegravir-emtricitabine-tenofovir (BIKTARVY) -25 MG per tablet   Rationale: Patient prefers not to take - Adherence - Adherence   Recommendation: Change Medication - Cabenuva 600 & 900 MG/3ML Suer   Status: Accepted per CPA

## 2024-01-03 RX ORDER — DIPHENHYDRAMINE HYDROCHLORIDE 50 MG/ML
50 INJECTION INTRAMUSCULAR; INTRAVENOUS
Start: 2024-01-17

## 2024-01-03 RX ORDER — METHYLPREDNISOLONE SODIUM SUCCINATE 125 MG/2ML
125 INJECTION, POWDER, LYOPHILIZED, FOR SOLUTION INTRAMUSCULAR; INTRAVENOUS
Start: 2024-01-17

## 2024-01-03 RX ORDER — ALBUTEROL SULFATE 90 UG/1
1-2 AEROSOL, METERED RESPIRATORY (INHALATION)
Start: 2024-01-17

## 2024-01-03 RX ORDER — EPINEPHRINE 1 MG/ML
0.3 INJECTION, SOLUTION, CONCENTRATE INTRAVENOUS EVERY 5 MIN PRN
OUTPATIENT
Start: 2024-01-17

## 2024-01-03 RX ORDER — ALBUTEROL SULFATE 0.83 MG/ML
2.5 SOLUTION RESPIRATORY (INHALATION)
OUTPATIENT
Start: 2024-01-17

## 2024-01-03 NOTE — PATIENT INSTRUCTIONS
Recommendations from today's [facility] visit:                                                      Cabenuva Information  Cabenuva is a long-acting injectable medication that replaces the tablets you take for HIV. Cabenuva has two medications: cabotegravir and rilpivirine.     Cabenuva injections:  Cabenuva is given as 2 injections (1 injection in each gluteal muscle)  Most common side effects are injection site pain. Uncommon side effect include flu-like symptoms, rash, itching, headache, severe injection site pain.  Injections must be given at the [facility] in [place]  The clinic is giving injections Monday through Friday, 9 AM until 3:30 PM    Cabenuva onboarding process:  First, we will check your medical insurance to see if it covers Cabenuva. I will update you in about 2 weeks if it's covered.  If covered, you can take an optional oral lead-in for 30 days prior to the first set of injections. This is a pill form of the medications in Cabenuva to make sure you do not have side effects.   Injection schedule:   First injection  4 weeks later: second injection and lab  8 weeks later: third injection and lab. Follow-up with infectious disease provider.   Continue injections every 8 weeks +/- 7 days     Appointments:   While the clinic will support you, you are responsible for having appointments scheduled ahead of time and keeping or rescheduling your own injection appointments.   Injections must be given every 8 weeks. You can get the injections up to 7 days before or after your target injection date.   If you go beyond 7 days of when the injection was due, you need to be bridged with oral medications to prevent resistance from developing.  You must call and reschedule your injection appointment if you can't make it   If you miss two appointments without calling to reschedule, you may not be able to stay on Cabenuva     Infectious disease clinic scheduling number: [phone]    Lab phone number: [phone]  [facility]   "pharmacist phone number: [phone]    Follow-up: 2 weeks after first injection if Cabenuva is started    It was great speaking with you today.  I value your experience and would be very thankful for your time in providing feedback in our clinic survey. In the next few days, you may receive an email or text message from [facility] [employer] with a link to a survey related to your  clinical pharmacist.\"     To schedule another MTM appointment, please call the clinic directly or you may call the MTM scheduling line at [phone] or toll-free at [phone].     My Clinical Pharmacist's contact information:                                                      Please feel free to contact me with any questions or concerns you have.      Lucille Eldridge, PharmD, AAHIVP  Medication Therapy Management Pharmacist     "

## 2024-01-03 NOTE — PROGRESS NOTES
[facility]                                                                                   OUTPATIENT PHYSICAL THERAPY    PLAN OF TREATMENT FOR OUTPATIENT REHABILITATION   Patient's Last Name, First Name, Aidan Aaron YOB: 2000   Provider's Name   [facility]   Medical Record No.  [id number]     Onset Date: 11/16/23  Start of Care Date: 12/20/23     Medical Diagnosis:  Acute bilateral low back pain without sciatica      PT Treatment Diagnosis:  Acute bilateral low back pain without sciatica Plan of Treatment  Frequency/Duration: 1x/week/ 6 weeks    Certification date from 12/20/23 to 02/21/24         See note for plan of treatment details and functional goals     Jazmine Olvera, PT                          12/20/23 0500   Appointment Info   Signing clinician's name / credentials Jazmine Olvera PT   Total/Authorized Visits 6 weeks   Visits Used 1x/week   Medical Diagnosis Acute bilateral low back pain without sciatica   PT Tx Diagnosis Acute bilateral low back pain without sciatica   Precautions/Limitations HIV POSITIVE   Other pertinent information GOAL: Work, walking   Quick Adds Certification   Progress Note/Certification   Start of Care Date 12/20/23   Onset of illness/injury or Date of Surgery 11/16/23   Therapy Frequency 1x/week   Predicted Duration 6 weeks   Certification date from 12/20/23   Certification date to 02/21/24   Progress Note Due Date 02/17/24   GOALS   PT Goals 2   PT Goal 1   Goal Identifier work   Goal Description work 8 hours, presently working 3 hours   Rationale   (work 8 hours pain free,)   Target Date 02/21/24   PT Goal 2   Goal Identifier walking   Goal Description 30 minute tolerance 0/10, currently 6-7/10   Rationale   (pain free walking in the community)   Target Date 02/21/24   Subjective Report   Subjective Report SEE IE   Treatment Interventions (PT)   Interventions Neuromuscular  Re-education;Therapeutic Procedure/Exercise   Therapeutic Procedure/Exercise   PTRx Ther Proc 1 Quadratus Lumborum Stretch   PTRx Ther Proc 1 - Details 10 second hold x 3 each side    Therapeutic Procedures: strength, endurance, ROM, flexibillity minutes (46831) 2   Neuromuscular Re-education   Neuromuscular re-ed of mvmt, balance, coord, kinesthetic sense, posture, proprioception minutes (09916) 8   Skilled Intervention to normalize abdominal contraction   Patient Response/Progress pt tolerated well   PTRx Neuro Re-ed 1 Abdominal Brace Transverse Abdominis  (8 min instruct and perform)   PTRx Neuro Re-ed 1 - Details 1. Inhale nose exhale mouth ( resting breath) 2. Inhale nose ( ribs expand to sides)  exhale mouth ( ribs come together. 3.  Inhale nose ( ribs expand to sides)  exhale mouth and tighen upper abdominals ( ribs come together)4.  Inhale nose ( ribs expand 0 laterally) and tighten lower abdominals ( elevator doors shutting 5. inhale nose and exhale and tighten all abdominals ( upper and tighten lower abdominals   Eval/Assessments   PT Eval, Low Complexity Minutes (91222) 25   Education   Learner/Method Patient;Pictures/Video;No Barriers to Learning   Education Comments pt instructed in home program   Plan   Home program See PTRX   Total Session Time   Timed Code Treatment Minutes 10   Total Treatment Time (sum of timed and untimed services) 35       I CERTIFY THE NEED FOR THESE SERVICES FURNISHED UNDER        THIS PLAN OF TREATMENT AND WHILE UNDER MY CARE     (Physician attestation of this document indicates review and certification of the therapy plan).              Referring Provider:  Jacquelyn Rivera    Initial Assessment  See Epic Evaluation- Start of Care Date: 12/20/23

## 2024-01-05 ENCOUNTER — PRE VISIT (OUTPATIENT)
Dept: ORTHOPEDICS | Facility: CLINIC | Age: 24
End: 2024-01-05

## 2024-01-12 DIAGNOSIS — Z21 ASYMPTOMATIC HUMAN IMMUNODEFICIENCY VIRUS (HIV) INFECTION STATUS (H): ICD-10-CM

## 2024-01-12 RX ORDER — BICTEGRAVIR SODIUM, EMTRICITABINE, AND TENOFOVIR ALAFENAMIDE FUMARATE 50; 200; 25 MG/1; MG/1; MG/1
1 TABLET ORAL DAILY
Qty: 90 TABLET | Refills: 0 | Status: SHIPPED | OUTPATIENT
Start: 2024-01-12 | End: 2024-05-06

## 2024-01-12 NOTE — TELEPHONE ENCOUNTER
Per [facility] Ambulatory Care Protocol, Pt is due for refill based on last refill date.   Pt has seen provider within the past year, or has appt scheduled with provider.     Med refill script E-sent to pt's pharmacy.    Signed Prescriptions:                        Disp   Refills    bictegravir-emtricitabine-tenofovir (BIKTA*90 tab*0        Sig: Take 1 tablet by mouth daily Labs and appointment           needed for any further refills. Please call           [phone].  Authorizing Provider: ERNIE RODAS  Ordering User: ALY JOSE RN  Infectious Disease 01/12/24 2:44 PM

## 2024-01-24 ENCOUNTER — TELEPHONE (OUTPATIENT)
Dept: INFECTIOUS DISEASES | Facility: CLINIC | Age: 24
End: 2024-01-24
Payer: COMMERCIAL

## 2024-01-24 DIAGNOSIS — R85.81 ANAL HIGH RISK HPV DNA TEST POSITIVE: Primary | ICD-10-CM

## 2024-01-24 NOTE — TELEPHONE ENCOUNTER
Pietro Hoover MD  P [facility] Infectious Disease Adult [facility]  Hello,    I have been attempting to call Aidan regarding his high-risk HPV result on his Anal pap. He needs a colorectal referral for HRA. See my result note on his HPV result for full details.    Can you call him and make sure he saw the message and then see if he is willing to go to [facility] for HRA?       __________________________________________________________________    Called patient and he is agreeable to [facility] referral.       Tarun Hurd RN  Infectious Disease on 1/24/2024 at 1:15 PM

## 2024-02-01 ENCOUNTER — TELEPHONE (OUTPATIENT)
Dept: TRANSPLANT | Facility: CLINIC | Age: 24
End: 2024-02-01
Payer: COMMERCIAL

## 2024-02-16 ENCOUNTER — OFFICE VISIT (OUTPATIENT)
Dept: INTERNAL MEDICINE | Facility: CLINIC | Age: 24
End: 2024-02-16
Payer: COMMERCIAL

## 2024-02-16 VITALS
HEART RATE: 92 BPM | BODY MASS INDEX: 38.43 KG/M2 | OXYGEN SATURATION: 98 % | DIASTOLIC BLOOD PRESSURE: 85 MMHG | SYSTOLIC BLOOD PRESSURE: 137 MMHG | HEIGHT: 64 IN

## 2024-02-16 DIAGNOSIS — F32.2 CURRENT SEVERE EPISODE OF MAJOR DEPRESSIVE DISORDER WITHOUT PSYCHOTIC FEATURES WITHOUT PRIOR EPISODE (H): ICD-10-CM

## 2024-02-16 DIAGNOSIS — Z13.9 ENCOUNTER FOR SCREENING INVOLVING SOCIAL DETERMINANTS OF HEALTH (SDOH): Primary | ICD-10-CM

## 2024-02-16 DIAGNOSIS — Z21 ASYMPTOMATIC HUMAN IMMUNODEFICIENCY VIRUS (HIV) INFECTION STATUS (H): ICD-10-CM

## 2024-02-16 PROCEDURE — 99214 OFFICE O/P EST MOD 30 MIN: CPT | Performed by: INTERNAL MEDICINE

## 2024-02-16 RX ORDER — BICTEGRAVIR SODIUM, EMTRICITABINE, AND TENOFOVIR ALAFENAMIDE FUMARATE 50; 200; 25 MG/1; MG/1; MG/1
1 TABLET ORAL DAILY
Qty: 90 TABLET | Refills: 0 | Status: CANCELLED | OUTPATIENT
Start: 2024-02-16

## 2024-02-16 ASSESSMENT — PATIENT HEALTH QUESTIONNAIRE - PHQ9: SUM OF ALL RESPONSES TO PHQ QUESTIONS 1-9: 23

## 2024-02-16 NOTE — PROGRESS NOTES
"  Assessment & Plan     Encounter for screening involving social determinants of health (SDoH)  Patient was referred to care coordination for assistance with additional resources.  - Primary Care - Care Coordination Referral; Future  - REVIEW OF HEALTH MAINTENANCE PROTOCOL ORDERS    Asymptomatic human immunodeficiency virus (HIV) infection status (H)  Discussed the need for follow-up with infectious disease.  Patient has an appointment coming up next month to discuss options for HIV management.  He is interested in injectable therapy.  He was encouraged to contact with infectious disease clinic to discuss his options.    Current severe episode of major depressive disorder without psychotic features without prior episode (H)  Given the severity of depression and passive suicidal ideation, recommend urgent evaluation at the behavioral emergency [facility].  Options for empath program or [facility] order given to the patient.  Patient expressed that he currently does not have a self-harm plan and plans to be evaluated at behavioral emergency later today.  No medication changes were recommended today.      I spent a total of 30 minutes on the day of the visit.   Time spent by me doing chart review, history and exam, documentation and further activities per the note      BMI  Estimated body mass index is 38.43 kg/m  as calculated from the following:    Height as of this encounter: 1.626 m (5' 4.02\").    Weight as of 12/27/23: 101.6 kg (224 lb).       Depression Screening Follow Up        2/16/2024     9:18 AM   PHQ   PHQ-9 Total Score 23   Q9: Thoughts of better off dead/self-harm past 2 weeks Several days                 Follow Up    Follow Up Actions Taken  Crisis resource information provided in the After Visit Summary  ED visit recommended.  Patient willing to go and has reliable transportation.    Discussed the following ways the patient can remain in a safe environment:  be around " "others        No follow-ups on file.    Lev Bermudez is a 23 year old, presenting for the following health issues:  Follow Up (Trouble sleeping at night, how to make reports on people for medication )      2/16/2024     9:10 AM   Additional Questions   Roomed by SK EMT     HPI     Patient reports that he has not been sleeping well. He has been having a lot of restless nights for the last 2 months. He has experiencing a lot of symptoms of depression. He recently lost her therapist about a month ago. He denies having a plan of self-harm. He has not been on a medicaiton for depression in the past.       Review of Systems  Constitutional, HEENT, cardiovascular, pulmonary, gi and gu systems are negative, except as otherwise noted.      Objective    /85 (BP Location: Right arm, Patient Position: Sitting, Cuff Size: Adult Regular)   Pulse 92   Ht 1.626 m (5' 4.02\")   SpO2 98%   BMI 38.43 kg/m    Body mass index is 38.43 kg/m .  Physical Exam   GENERAL: alert and fatigued  EYES: Eyes grossly normal to inspection, PERRL and conjunctivae and sclerae normal  RESP: normal effort, no wheezing  CV: regular rates and rhythm and no peripheral edema  MS: no gross musculoskeletal defects noted, no edema  SKIN: no suspicious lesions or rashes  PSYCH: fatigued and judgement and insight intact            Signed Electronically by: Jacquelyn Rivera MD    "

## 2024-02-16 NOTE — COMMUNITY RESOURCES LIST (ENGLISH)
02/16/2024   [facility]  N/A  For questions about this resource list or additional care needs, please contact your primary care clinic or care manager.  Phone: [phone]   Email: N/A   Address: [address] [place] [address]   Hours: N/A        Food and Nutrition       Food pantry  1  [facility] - Human Services - Community Food Shelf Distance: 0.31 miles      In-Person, Pickup   [address] [place] [address]  Language: Japanese, English, Jamaican, Jamaican Creole, Hmong, Mandarin, Oromo, Kazakh, Bulgarian  Hours: Mon - Thu 10:00 AM - 4:00 PM  Fees: Free   Phone: (462) 305-2997 Website: http://www.[facility]/human-services     2  [facility] Distance: 0.46 miles      In-Person   [address] [place] [address]  Language: English  Hours: Sat 11:00 AM - 2:00 PM  Fees: Free   Phone: (920) 370-7481 Email: domingo@Therasis Website: https://www.Mobjoy.org/     SNAP application assistance  3  [facility] Human Services and Public Health Department [facility] Distance: 0.63 miles      In-Person, Phone/Virtual   [address] [place] [address]  Language: English  Hours: Mon - Fri 8:00 AM - 4:30 PM  Fees: Free   Phone: (985) 318-5973 Email: servicecentmiladys@[employer]. Website: https://www.[place]./[facility]/facilities/service-center-info     4  [facility] Distance: 0.8 miles      In-Person, Phone/Virtual   [address] [place] [address]  Language: English, Figueroa, Caio  Hours: Mon - Fri 9:00 AM - 5:00 PM  Fees: Free   Phone: (708) 777-2755 Email: yudith@[employer] Website: https://www.Quintiles.combionic/[employer].org/     Soup kitchen or free meals  5  [facility] Distance: 0.17 miles      Pickup   [address] [place] [address]  Language: English, Bulgarian  Hours: Tue - Thu  4:00 PM - 6:00 PM  Fees: Free   Phone: (800) 355-4027 Email: communications@[facility]'s HospitalPint Please Website: https://www.[facility] YukonShanghai Shipping Freight ExchangemnHepatoChemorg/HidalgoShanghai Shipping Freight ExchangeSeattle     6  [employer] [facility] Recreation [place] - [facility] Kitchen Distance: 0.64 miles      In-Person, Pickup   [address] [place] [address]  Language: English  Hours: Mon - Fri 3:00 PM - 9:00 PM , Sat 9:00 AM - 4:00 PM  Fees: Free   Phone: (186) 256-8407 Email: northcommons@[employer]akum[employer]ryTrader.com Website: https://www.[employer]akumIndustryTrader.com/parks__destinations/recreation_centers/[facility]_recreation_center/          Hotlines and Helplines       Hotline - Housing crisis  7  [facility] Distance: 1.98 miles      Phone/Virtual   [address] [place] [address]  Language: English  Hours: Mon - Sun Open 24 Hours  Fees: Free   Phone: (550) 835-8905 Email: info@saintolaf.org Website: http://www.saintolaf.org/     8  [facility] Distance: 2.16 miles      Phone/Virtual   [address] [place] [address]  Language: English  Hours: Mon - Sun Open 24 Hours   Phone: (746) 668-2609 Email: info@[employer] Lifeforms Website: http://www.Zidoff eCommerce.org          Housing       Coordinated Entry access point  9  [facility]ult [facility] Connect (ASC) Distance: 1.17 miles      In-Person, Phone/Virtual   [address] [place] [address]  Language: English, Belarusian  Hours: Mon - Fri 10:00 AM - 5:30 PM , Mon - Sun 7:30 PM - 10:20 PM , Sat - Sun 1:00 PM - 5:30 PM  Fees: Free   Phone: (918) 412-1356 Email: info@MySongToYou Website: https://www.Vistaar.org/our-programs/adult-shelter-connect-[facility]/     10  [facility] - Adult correction Connect [facility] Distance: 1.98 miles      In-Person   [address] [place] [place] [address]  Language: English  Hours: Mon - Sat 10:00 PM - 5:00 PM  Fees: Free   Phone: (470) 739-7981 Email: info@saintolaf.[employer] Website: http://www.saintolaf.org/      Drop-in center or day shelter  11  [facility] Distance: 1.17 miles      In-Person   [address] [place] [address]  Language: English, Hmong, Comoran, Azeri  Hours: Mon - Thu 8:30 AM - 4:30 PM , Sat - Sun 9:00 AM - 12:00 PM  Fees: Free   Phone: (605) 589-9098 Email: info@Softheon.org Website: https://Softheon.org/     12  [facility] Distance: 2.47 miles      In-Person   [address] [place] [address]  Language: English  Hours: Mon - Fri 12:00 PM - 3:00 PM  Fees: Free   Phone: (503) 507-7762 Email: Spot Influence@[employer] Website: http://Spot Influence.TeeBeeDee/     Housing search assistance  13  [facility] Distance: 0.25 miles      Phone/Virtual   [address] [place] [address]  Language: English  Hours: Mon - Thu 9:00 AM - 5:00 PM  Fees: Free   Phone: (636) 970-9086 Email: info@[facility].org Website: https://www.Entelec Control Systems.com/[place].[facility]/     14  [facility] Human Services [facility] Housing Supports Distance: 0.31 miles      In-Person, Phone/Virtual   [address] [place] [address]  Language: Kinyarwanda, English, Georgian, Georgian Creole, Hmong, Mandarin, Oromo, Comoran, Azeri  Hours: Mon - Fri 8:30 AM - 5:00 PM  Fees: Free   Phone: (112) 356-5493 Website: http://www.[facility].org/human-services     Shelter for families  15  [facility] Distance: 19.2 miles      In-Person   [address] [place] [address]  Language: English  Hours: Mon - Fri 3:00 PM - 9:00 AM , Sat - Sun Open 24 Hours  Fees: Free   Phone: (683) 871-9671 Ext.1 Website: https://www.saintChumbyDaytons.org/2020/07/03/emergency-family-shelter/     Shelter for individuals  16  [facility] Distance: 1.19 miles      In-Person   [address] [place] [address]  Language: English  Hours: Mon -  Sun 5:00 PM - 10:00 AM  Fees: Free, Self Pay   Phone: (967) 831-8745 Email: info@[employer]oint [employer].CivicSolar Website: https://www.Micropoint Technologies.CivicSolar/locations/[facility]-ground-shelter/     17  [facility] Distance: 1.44 miles      In-Person   [address] [place] [address]  Language: English  Hours: Mon - Fri 4:00 PM - 9:00 AM  Fees: Free   Phone: (315) 693-4119 Email: luz@[facility] Hospital[facility]Frog.org Website: https://centralusa.[facility].org/northern/[facility]/          Important Numbers & Websites       Emergency Services   911  City Services   311  Poison Control   (466) 552-7734  Suicide Prevention Lifeline   (208) 118-6555 (TALK)  Child Abuse Hotline   (963) 359-8615 (4-A-Child)  Sexual Assault Hotline   (567) 137-6784 (HOPE)  National Runaway Safeline   (657) 718-9209 (RUNAWAY)  All-Options Talkline   (196) 376-2062  Substance Abuse Referral   (863) 875-1235 (HELP)

## 2024-02-16 NOTE — PROGRESS NOTES
Aidan is a 23 year old that presents in clinic today for the following:     Chief Complaint   Patient presents with    Follow Up     Trouble sleeping at night, how to make reports on people for medication            2/16/2024     9:10 AM   Additional Questions   Roomed by SK EMT     Screenings as of today     PHQ-2 Total Score (Adult) - Positive if 3 or more points; Administer   PHQ-9 if positive 6    PHQ-9 Total Score 23        Snehal Peralta MA at 9:21 AM on 2/16/2024     intact

## 2024-02-20 ENCOUNTER — PATIENT OUTREACH (OUTPATIENT)
Dept: CARE COORDINATION | Facility: CLINIC | Age: 24
End: 2024-02-20
Payer: COMMERCIAL

## 2024-02-20 NOTE — LETTER
PRIMARY CARE CARE COORDINATION   [facility]  [address] [place] [address]    February 28, 2024    Aidan Henning  [address]  [facility] [address]      Dear Aidan,        I am a clinic care coordinator who works with Jacquelyn Rivera MD with the Primary Care clinic at the [facility] I recently tried to call and was unable to reach you. Below is a description of clinic care coordination and how I can further assist you.       The clinic care coordinator nurse is a nurse who understands the health care system. The goal of clinic care coordination is to help you manage your health and improve access to the health care system. Our team works alongside your provider to assist you in determining your health and social needs. We can help you obtain health care and community resources, providing you with necessary information and education. We can work with you through any barriers and develop a care plan that helps coordinate and strengthen the communication between you and your care team. Our services are voluntary and are offered without charge to you personally.    Please feel free to contact me with any questions or concerns regarding care coordination and what we can offer.      We are focused on providing you with the highest-quality healthcare experience possible.    Sincerely,     JENNA Eldridge Care Manager  Primary Care Clinic   Phone: [phone]  Fax: [phone]

## 2024-02-20 NOTE — PROGRESS NOTES
Clinic Care Coordination Contact    Situation: Patient chart reviewed by care coordinator.    Background: Referred by PCP for SDOH Concern on 2/16/24.     Assessment: MedigoharWishGenie message sent to patient.     Plan/Recommendations: RN will await pt's MyChart reply for further outreach.    VENKAT COHEN RN on 2/20/2024 at 8:26 AM        Sdoh Brief (Food Insecurity,Housing Stability,Financial Resource Strain,And Transportation Needs)    Question 2/16/2024  9:02 AM CST - Filed by Patient 11/17/2023  8:00 AM CST - Filed by Patient   Within the past 12 months, did you worry that your food would run out before you got money to buy more? No Yes   Within the past 12 months, did the food you bought just not last and you didn t have money to get more? Yes No   Do you have housing? No No   Are you worried about losing your housing? Patient declined No   Within the past 12 months, have you or your family members you live with been unable to get utilities (heat, electricity) when it was really needed? Patient declined No   Within the past 12 months, has lack of transportation kept you from medical appointments, getting your medicines, non-medical meetings or appointments, work, or from getting things that you need? Patient declined Yes   Would you like to be contacted by a care coordinator to help with housing and/or utility needs? (!) YES Yes

## 2024-02-28 NOTE — PROGRESS NOTES
Clinic Care Coordination Contact  [facility]/Voicemail    Clinical Data: Care Coordinator Outreach    Outreach Documentation Number of Outreach Attempt   2/28/2024  12:48 PM 2       Left message on patient's voicemail with call back information and requested return call.    Plan: Care Coordinator will send unable to contact letter with care coordinator contact information via mail. Care Coordinator will do no further outreaches at this time.    VENKAT COHEN RN on 2/28/2024 at 12:48 PM

## 2024-04-09 ENCOUNTER — OFFICE VISIT (OUTPATIENT)
Dept: INTERNAL MEDICINE | Facility: CLINIC | Age: 24
End: 2024-04-09
Payer: COMMERCIAL

## 2024-04-09 ENCOUNTER — LAB (OUTPATIENT)
Dept: LAB | Facility: CLINIC | Age: 24
End: 2024-04-09
Payer: COMMERCIAL

## 2024-04-09 VITALS
OXYGEN SATURATION: 96 % | HEIGHT: 64 IN | HEART RATE: 85 BPM | DIASTOLIC BLOOD PRESSURE: 85 MMHG | WEIGHT: 210.3 LBS | BODY MASS INDEX: 35.9 KG/M2 | SYSTOLIC BLOOD PRESSURE: 132 MMHG

## 2024-04-09 DIAGNOSIS — Z21 HUMAN IMMUNODEFICIENCY VIRUS I INFECTION (H): ICD-10-CM

## 2024-04-09 DIAGNOSIS — R21 RASH AND NONSPECIFIC SKIN ERUPTION: ICD-10-CM

## 2024-04-09 DIAGNOSIS — Z13.9 ENCOUNTER FOR SCREENING INVOLVING SOCIAL DETERMINANTS OF HEALTH (SDOH): Primary | ICD-10-CM

## 2024-04-09 PROCEDURE — 87591 N.GONORRHOEAE DNA AMP PROB: CPT | Performed by: NURSE PRACTITIONER

## 2024-04-09 PROCEDURE — 36415 COLL VENOUS BLD VENIPUNCTURE: CPT | Performed by: PATHOLOGY

## 2024-04-09 PROCEDURE — 99000 SPECIMEN HANDLING OFFICE-LAB: CPT | Performed by: PATHOLOGY

## 2024-04-09 PROCEDURE — 87536 HIV-1 QUANT&REVRSE TRNSCRPJ: CPT | Performed by: NURSE PRACTITIONER

## 2024-04-09 PROCEDURE — 87491 CHLMYD TRACH DNA AMP PROBE: CPT | Performed by: NURSE PRACTITIONER

## 2024-04-09 PROCEDURE — 86780 TREPONEMA PALLIDUM: CPT | Performed by: NURSE PRACTITIONER

## 2024-04-09 PROCEDURE — 86593 SYPHILIS TEST NON-TREP QUANT: CPT | Performed by: NURSE PRACTITIONER

## 2024-04-09 PROCEDURE — 86592 SYPHILIS TEST NON-TREP QUAL: CPT | Performed by: NURSE PRACTITIONER

## 2024-04-09 PROCEDURE — 99215 OFFICE O/P EST HI 40 MIN: CPT | Performed by: NURSE PRACTITIONER

## 2024-04-09 RX ORDER — TRIAMCINOLONE ACETONIDE 1 MG/G
CREAM TOPICAL 2 TIMES DAILY PRN
Qty: 30 G | Refills: 0 | Status: SHIPPED | OUTPATIENT
Start: 2024-04-09

## 2024-04-09 ASSESSMENT — ANXIETY QUESTIONNAIRES
1. FEELING NERVOUS, ANXIOUS, OR ON EDGE: MORE THAN HALF THE DAYS
2. NOT BEING ABLE TO STOP OR CONTROL WORRYING: MORE THAN HALF THE DAYS
5. BEING SO RESTLESS THAT IT IS HARD TO SIT STILL: MORE THAN HALF THE DAYS
IF YOU CHECKED OFF ANY PROBLEMS ON THIS QUESTIONNAIRE, HOW DIFFICULT HAVE THESE PROBLEMS MADE IT FOR YOU TO DO YOUR WORK, TAKE CARE OF THINGS AT HOME, OR GET ALONG WITH OTHER PEOPLE: SOMEWHAT DIFFICULT
GAD7 TOTAL SCORE: 14
7. FEELING AFRAID AS IF SOMETHING AWFUL MIGHT HAPPEN: MORE THAN HALF THE DAYS
6. BECOMING EASILY ANNOYED OR IRRITABLE: MORE THAN HALF THE DAYS
GAD7 TOTAL SCORE: 14
3. WORRYING TOO MUCH ABOUT DIFFERENT THINGS: MORE THAN HALF THE DAYS

## 2024-04-09 ASSESSMENT — PATIENT HEALTH QUESTIONNAIRE - PHQ9
5. POOR APPETITE OR OVEREATING: MORE THAN HALF THE DAYS
SUM OF ALL RESPONSES TO PHQ QUESTIONS 1-9: 12

## 2024-04-09 NOTE — PROGRESS NOTES
Assessment and Plan   Mr. Henning is a 23 year old male with history of HIV, high risk HPV, depression, sleep disorder, and substance use disorder who presents today for a focused visit to evaluate skin rash.    (Z13.9) Encounter for screening involving social determinants of health (SDoH)  (primary encounter diagnosis)  Comment: Patient expresses instability with housing, finances, and ability to obtain mediations. He denies immediate need or threats to his safety, but expresses interest in getting further assistance  Plan:   -Social Work Referral Specific Sites Only - See Locations in Order    (B20) Human immunodeficiency virus I infection (H)  Comment: Chronic condition without obvious complications but unable to rule this out as related to the rash - plan to recheck levels today. Aidan is to continue daily Biktarvy and was instructed on the importance of not missing doses. Plan to follow up with ID team.  Plan:   -HIV-1 RNA quantitative  -triamcinolone (KENALOG) 0.1 % external cream  -Treponema Abs w Reflex to RPR and Titer  -NEISSERIA GONORRHOEA PCR  -CHLAMYDIA TRACHOMATIS PCR  -Adult Infectious Disease  Referral    (R21) Rash and nonspecific skin eruption  Comment: Acute, uncontrolled condition. Labs today to recheck HIV RNA and STI screening, though there is a possibility that it could be irritant related with use of different products. He was encouraged to utilize soaps/detergents free of scents and additional chemicals  Plan:   -HIV-1 RNA quantitative  -triamcinolone (KENALOG) 0.1 % external cream  -Treponema Abs w Reflex to RPR and Titer  -NEISSERIA GONORRHOEA PCR  -CHLAMYDIA TRACHOMATIS PCR    Health Maintenance:  Immunizations (covid, influenza, pneumovax, rsv, zoster, Tdap, Hep A/B):   Most Recent Immunizations   Administered Date(s) Administered    COVID-19 12+ (2023-24) (Pfizer) 11/17/2023    COVID-19 Bivalent 12+ (Pfizer) 10/31/2022    COVID-19 Monovalent 18+ (Moderna) 12/22/2021    Comvax  (HIB/HepB) 10/01/2001    DTAP (<7y) 12/03/2003    HPV9 12/27/2023    Hepatitis B, Peds 1998    Historic Hib Prohibit 1998    Influenza Vaccine >6 months,quad, PF 11/17/2023    MMR 10/01/2001    Pneumococcal (PCV 7) 10/01/2001    Poliovirus, inactivated (IPV) 12/03/2003    Smallpox/Mpox Vaccine Subcutaneous (JYNNEOS) 10/31/2022    TDAP (Adacel,Boostrix) 03/19/2022    Varicella 11/02/2011       Return to clinic/Follow-up:  Aidan will be contacted with the results once they are available. Otherwise as needed and with no improvement, worsening, or new symptom development.     Options for treatment and follow-up care were reviewed with the patient. He engaged in the decision making process and verbalized understanding of the options discussed and agreed with the final plan.    I spent a total of 45 minutes in the care of this pt today, including time prior to, during, and following today's office visit. This time includes reviewing the patient's chart and prior history, external notes, obtaining a history, performing an examination, interpreting test results, and evaluation and counseling the patient. This time also includes ordering medications or tests necessary in addition to communication to other member's of the patient's health care team. Time spent in documentation and care coordination is included.    Sharyn Veras RN, Student DNP      I was present with the NPP student who participated in the service and in the documentation of the services provided.  I have verified the history and personally performed the physical exam and medical decision making, as documented by the student and edited by me.      Priscilla Olvera, ANP, [facility]  Nurse Practitioner        _______________________    Subjective   Presenting Concern:      Mr. Henning is a 23 year old male with history of HIV, high risk HPV, depression, sleep disorder, and substance use disorder who presents today for a focused visit to discuss skin  "concerns.    History of Present Illness:    Patient is complaining of itching and rash for the past 2 weeks across his trunk, upper and lower extremities, and upper pubic area. He denies exposure to anyone else with a rash. He recalls having a similar reaction about 2 years ago and that it resolved spontaneously without intervention - he has not had anything like this before otherwise.     He is prescribed Biktarvy for HIV treatment but reports that he had missed taking this for >1 week due to difficulties obtaining the medication. He just got a refill two days ago and has resumed this now. He would like to get on the injection as soon as possible to help avoid the potential for missed doses. He was last seen by infectious disease in January.    He is sexually active and using condoms. He performs self checks and discusses STI prevention with his partners.    He reports that he has been using multiple different soaps and laundry detergents, often using \"whatever [he] can get his hands on\". He expresses interest in talking with a  about community and financial resources.    Past Medical History:  Patient Active Problem List   Diagnosis    Morbid obesity (H)    Syphilis    Chlamydia    Gonorrhea    Human immunodeficiency virus I infection (H)    Acute low back pain without sciatica     Active Meds:  Current Outpatient Medications   Medication Sig Dispense Refill    triamcinolone (KENALOG) 0.1 % external cream Apply topically 2 times daily as needed for irritation 30 g 0    bictegravir-emtricitabine-tenofovir (BIKTARVY) -25 MG per tablet Take 1 tablet by mouth daily Labs and appointment needed for any further refills. Please call [phone]. 90 tablet 0     No current facility-administered medications for this visit.      Allergies:  Reviewed, refer to EMR    Relevant Social History:  Living Arrangement: Reports unstable housing, often goes from place to place  Sexual Health: Sexually active, using " "condoms  Sleep: Disrupted sleep cycle related to working overnight shifts      ROS  General: Negative for fever or chills, positive for fatigue  Skin: Positive for itching and rash, negative for hair or nail changes  Eyes: Negative for eye pain or visual disturbances  Ears/Nose/Throat: Negative for difficulty swallowing, mouth sores, or sinus pain  Respiratory: Negative for shortness of breath or wheezing  Cardiovascular: Negative for chest pain or palpitations  Gastrointestinal: Negative for constipation, diarrhea, or nausea  Genitourinary: Negative for dysuria, frequency, penile pain lesions or discharge  Psychiatric: Negative for sexual difficulties, positive for sleep disturbances and history of depression    Objective    /85 (BP Location: Right arm, Patient Position: Sitting, Cuff Size: Adult Large)   Pulse 85   Ht 1.626 m (5' 4.02\")   Wt 95.4 kg (210 lb 4.8 oz)   SpO2 96%   BMI 36.08 kg/m      Physical Exam   General appearance: alert, well appearing, and in no distress  Mental status: alert, oriented to person, place, and time  Eyes: extra-ocular movements intact, pupils equally round and reactive bilaterally, no scleral icterus  Ears: bilateral TM's pearly gray with minimal cerumen buildup, mild erythema visible in right ear canal  Mouth: mucous membranes moist and pink. No oral lesions. Good dentition.  Neck: no lymphadenopathy or swelling  Respiratory: Good air movement bilaterally, lungs clear, no wheezes/rales/rhonchi  Cardiovascular: normal rate and regular rhythm, S1 and S2 normal, no murmurs, rubs or gallops  GI/: Abdomen soft , non-distended, penis and testicles not visualized  Skin: scattered macules with hyperpigmentation ~ 2-3mm noted across back, buttocks, posterior bilateral upper arms and posterior lower extremities; no swelling, discharge, or redness; normal hair distribution, no signs of excoriation, small 1 mm open area to left posterior upper thigh on existing " scar  Psychiatric: cooperative, normal mentation, affect and mood

## 2024-04-10 LAB
C TRACH DNA SPEC QL NAA+PROBE: NEGATIVE
N GONORRHOEA DNA SPEC QL NAA+PROBE: NEGATIVE
RPR SER QL: REACTIVE
RPR SER-TITR: ABNORMAL {TITER}
T PALLIDUM AB SER QL: REACTIVE

## 2024-04-11 ENCOUNTER — OFFICE VISIT (OUTPATIENT)
Dept: INFECTIOUS DISEASES | Facility: CLINIC | Age: 24
End: 2024-04-11
Attending: INTERNAL MEDICINE
Payer: COMMERCIAL

## 2024-04-11 ENCOUNTER — PATIENT OUTREACH (OUTPATIENT)
Dept: INFECTIOUS DISEASES | Facility: CLINIC | Age: 24
End: 2024-04-11
Payer: COMMERCIAL

## 2024-04-11 VITALS
HEART RATE: 110 BPM | HEIGHT: 64 IN | TEMPERATURE: 98.2 F | BODY MASS INDEX: 36.54 KG/M2 | SYSTOLIC BLOOD PRESSURE: 151 MMHG | DIASTOLIC BLOOD PRESSURE: 87 MMHG | WEIGHT: 214 LBS

## 2024-04-11 DIAGNOSIS — Z21 HUMAN IMMUNODEFICIENCY VIRUS I INFECTION (H): ICD-10-CM

## 2024-04-11 LAB
C TRACH DNA SPEC QL NAA+PROBE: NEGATIVE
HIV1 RNA # PLAS NAA DL=20: NOT DETECTED COPIES/ML
N GONORRHOEA DNA SPEC QL NAA+PROBE: NEGATIVE

## 2024-04-11 PROCEDURE — 87591 N.GONORRHOEAE DNA AMP PROB: CPT | Performed by: INTERNAL MEDICINE

## 2024-04-11 PROCEDURE — G0009 ADMIN PNEUMOCOCCAL VACCINE: HCPCS

## 2024-04-11 PROCEDURE — 99215 OFFICE O/P EST HI 40 MIN: CPT | Performed by: INTERNAL MEDICINE

## 2024-04-11 PROCEDURE — 87491 CHLMYD TRACH DNA AMP PROBE: CPT | Performed by: INTERNAL MEDICINE

## 2024-04-11 PROCEDURE — 250N000011 HC RX IP 250 OP 636

## 2024-04-11 PROCEDURE — G0463 HOSPITAL OUTPT CLINIC VISIT: HCPCS | Mod: 25 | Performed by: INTERNAL MEDICINE

## 2024-04-11 PROCEDURE — 90472 IMMUNIZATION ADMIN EACH ADD: CPT

## 2024-04-11 PROCEDURE — 90677 PCV20 VACCINE IM: CPT

## 2024-04-11 PROCEDURE — 90651 9VHPV VACCINE 2/3 DOSE IM: CPT

## 2024-04-11 PROCEDURE — 90471 IMMUNIZATION ADMIN: CPT

## 2024-04-11 PROCEDURE — 250N000021 HC RX MED A9270 GY (STAT IND- M) 250

## 2024-04-11 RX ADMIN — PNEUMOCOCCAL 20-VALENT CONJUGATE VACCINE 0.5 ML
2.2; 2.2; 2.2; 2.2; 2.2; 2.2; 2.2; 2.2; 2.2; 2.2; 2.2; 2.2; 2.2; 2.2; 2.2; 2.2; 4.4; 2.2; 2.2; 2.2 INJECTION, SUSPENSION INTRAMUSCULAR at 09:22

## 2024-04-11 RX ADMIN — HUMAN PAPILLOMAVIRUS 9-VALENT VACCINE, RECOMBINANT 0.5 ML: 30; 40; 60; 40; 20; 20; 20; 20; 20 INJECTION, SUSPENSION INTRAMUSCULAR at 09:22
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
## 2024-04-11 NOTE — PATIENT INSTRUCTIONS
I'll let you know when your last pending lab test comes back.  We will do a throat swab today  We will make an appointment for you to have evaluation for anal cancer and make sure it's not a problem and never will be. This will be with the colorectal surgery team as we discussed.   Continue Biktarvy - you're doing a great job with it and your labs show that!   We will give you #2 of 3 total shots for HPV vaccination today  We will do a pneumonia shot today (called Prevnar 20)  You are still due for meningitis vaccine in the future  The first step to getting you to the injectable HIV medication (called Cabenuva) will be getting you a stable doctor in the HIV clinic. I'm happy to be that doctor. Make an early morning appointment in 3 months with me as the next step. Remember to change that appointment if you won't be able to make it!

## 2024-04-11 NOTE — PROGRESS NOTES
[facility]    Division of Infectious Diseases and International Medicine    HIV OUTPATIENT VISIT NOTE   Patient:  Aidan Henning, Date of birth 2000, Medical record number [id number]  Date: 4/11/24  Reason: HIV follow-up       Assessment and Plan     # HIV  - ART: Continue Biktarvy, he is very well controlled  - Medication Adherence discussed  - Opportunistic Infection prophylaxis Not indicated  - Interested in Cabenuva, will have our pharmacist reach out via phone  - Due for repeat HCV screening with next lab draw (just had labs 11/2023 so can defer today)  - Follow-up in 3 months with me    #Pascale    # Preventative Medicine  Sexually Transmitted Infection Risk and Screening:  Offered - not been active                       Gonorrhea and Chlamydia: - as above                       Syphilis: Serofast in the 1:2 to 1:4 range. Typically trending every 6 months or so, along with other STD screening.    Cancer screening: Performed Anal pap today, will f/u results    Bone Health: Due at age 48    Immunizations:  COVID-19: up to date  Influenza: up to date  mPox - received 1 dose  HPV: 2/3 doses received  Hepatitis A: due  Hepatitis B:  due  Tdap: Received 2022  PCV20 - given today  Meningococcus: due    Cardiovascular Hayden:                        Lipids: Deferred today given age              Blood Pressure: at goal    Renal Health: Monitor   Creatinine   Date Value Ref Range Status   11/17/2023 1.09 0.67 - 1.17 mg/dL Final   06/10/2021 0.90 0.66 - 1.25 mg/dL Final      Plan for today:   I'll let you know when your last pending lab test comes back.  We will do a throat swab today  We will make an appointment for you to have evaluation for anal cancer and make sure it's not a problem and never will be. This will be with the colorectal surgery team as we discussed.   Continue Biktarvy - you're doing a great job with it and your labs show that!   We will give you #2 of 3 total shots for  HPV vaccination today  We will do a pneumonia shot today (called Prevnar 20)  You are still due for meningitis vaccine in the future  The first step to getting you to the injectable HIV medication (called Cabenuva) will be getting you a stable doctor in the HIV clinic. I'm happy to be that doctor. Make an early morning appointment in 3 months with me as the next step. Remember to change that appointment if you won't be able to make it!     Maryjane Monson MD  Infectious Diseases      I spent 45 minutes as part of a visit on the date of the encounter doing chart review, history and exam, documentation, and care coordination.        HPI/Subjective     23M who presents as a transfer of care  for HIV.      At last visit 3/2023, his RPR had slightly changed from 1:2 to 1:4, though no symptoms or new exposures. Continues to have tinnitus and some issues hearing out of his left ear. He had good adherence to his HIV medication, Biktarvy. He had recently started a new job.       He saw a PCP Dr. Rivera 11/2023 for fatigue and back pain. PT referral placed. 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 TSH normal.     Interested in switching to the injection.  He doesn't like Biktarvy because he does not take like taking pills every day and misses a few pills here and there (1-2 a month); he also lost a whole bottle of pills.    Doing well overall. Working at [employer] and liking it.           HIV Labs and History:     HIV  Diagnosed 6/10/2021- Viral load 6k CD4 600s                 Started on Biktarvy  08/2021  After 4 months of treatment- Viral load 0 CD4 808 (12/22/21)  Suboptimal adherence summer 2022- Viral load 741, CD4 699 10/31/22  ----------  Labs  Toxo IgG-ve, [id number] -ve  Quantiferon negative 3/2023.  CMV+     Prior OI  None     Co-morbid Infections  Hep B: low titre as as of 6/10/21  Hep C: neg 2021     Other STD  Syphilis: Previously treated in 2021 - 3x IM inj PCN G (6/2021, 8/5 and  11/2021)  RPR trend: 6/2021 1:32, then treated. After tx: 12/2021: 1:8. Has since bounced between 1:2 and 1:4 since 10/2022. 11/17/2023 1:2.     Gonococcal and Chlamydia (throat, rectal 10/31/2022) tx in 12/2022 (CTX and doxy tx). Repeat gonorrhea throat swab neg.    LABS  Absolute CD4   Date Value Ref Range Status   06/10/2021 606 441 - 2,156 cells/uL Final     Absolute CD4, [place] T Cells   Date Value Ref Range Status   11/17/2023 1,338 441 - 2,156 cells/uL Final     HIV-1 RNA Quant Result   Date Value Ref Range Status   06/10/2021 6,123 (A) HIVND^HIV-1 RNA Not Detected [Copies]/mL Final     Comment:     The RYAN AmpliPrep/RYAN TaqMan HIV-1 test is an FDA-approved in vitro   nucleic acid amplification test for the quantitation of HIV-1 RNA in human   plasma (EDTA plasma) using the RYAN AmpliPrep instrument for automated viral   nucleic acid extraction and the RYAN TaqMan Analyzer or [employer] TaqMan for   automated Real Time PCR amplification and detection of the viral nucleic acid   target.  Titer results are reported in copies/ml. This assay is intended for use in   conjunction with clinical presentation and other laboratory markers of disease   prognosis and for use as an aid in assessing viral response to antiretroviral   treatment as measured by changes in plasma HIV-1 RNA levels. This test should   not be used as a donor screening test to confirm the presence of HIV-1   infection.       HIV-1 RNA copies/mL   Date Value Ref Range Status   11/17/2023 Not Detected Not Detected Copies/mL Final   03/06/2023 <20 (A) Not Detected Copies/mL Final     Treponema Antibodies   Date Value Ref Range Status   06/10/2021 Reactive (A) NR^Nonreactive Final     Comment:     The Anti-Treponema Antibody screening tends to remain positive for life,   therefore it does not distinguish between active and past syphilis infections.   All positive and equivocal results will automatically reflex to a   non-specific Rapid Plasma Reagin  (RPR) test.  If the Treponema Antibody is positive, and the RPR is positive, this is   presumptive evidence of current infection, inadequately treated infection,   persistent infection or reinfection.  If the Anti-Treponema Antibody is positive and the RPR is negative, then a   second Treponema specific test (TP-PA) will be performed to determine whether   the antibody test is falsely positive or is detecting early infection.  If the   latter is suspected, repeat testing in approximately two weeks is   recommended.  Methodology Change: Test performed on the [employer] XL by Treponema   pallidum Total Antibodies Assay as of 3.17.2020.       Treponema Antibody Total   Date Value Ref Range Status   04/09/2024 Reactive (A) Nonreactive Final     Comment:     The Anti-Treponema Antibody screening tends to remain positive for life, therefore it does not distinguish between active and past syphilis infections. All positive and equivocal results will automatically reflex to a non-specific Rapid Plasma Reagin (RPR) test.    If the Treponema Antibody is positive, and the RPR is positive, this is presumptive evidence of current infection, inadequately treated infection, persistent infection or reinfection.    If the Anti-Treponema Antibody is positive and the RPR is negative, then a second Treponema specific test (TP-PA) will be performed to determine whether the antibody test is falsely positive or is detecting early infection.  If the latter is suspected, repeat testing in approximately two weeks is recommended.     Hep B Surface Agn   Date Value Ref Range Status   06/10/2021 Nonreactive NR^Nonreactive Final             Other Medical History:     Attempt was made to collect past, family and social history during this encounter,  this information was reviewed with the patient and updated    Allergies:  Patient has no known allergies.    Past Medical History  No past medical history on file. PastSurgical History   has no  "past surgical history on file.   Family History  No family history on file. Social History  He reports that he has been smoking cigarettes. He has never used smokeless tobacco. He reports current alcohol use. He reports current drug use. Drug: Marijuana.    He is a  at [employer] and also a  at [place].   Notable Exposures  None Vaccination History:  Immunization History   Administered Date(s) Administered    COVID-19 12+ (2023-24) (Pfizer) 11/17/2023    COVID-19 Bivalent 12+ (Pfizer) 10/31/2022    COVID-19 Monovalent 18+ (Moderna) 08/11/2021, 12/22/2021    Comvax (HIB/HepB) 10/01/2001    DTAP (<7y) 1998, 10/01/2001, 03/13/2003, 12/03/2003    HPV9 12/27/2023    Hepatitis B, Peds 1998, 2000    Historic Hib Prohibit 1998    Influenza Vaccine >6 months,quad, PF 11/17/2023    MMR 10/01/2001    Pneumococcal (PCV 7) 10/01/2001    Poliovirus, inactivated (IPV) 1998, 10/01/2001, 12/03/2003    Smallpox/Mpox Vaccine Subcutaneous (JYNNEOS) 10/31/2022    TDAP (Adacel,Boostrix) 11/02/2011, 03/19/2022    Varicella 11/02/2011             Physical Exam:     VITAL SIGNS:  BP (!) 151/87   Pulse 110   Temp 98.2  F (36.8  C) (Oral)   Ht 1.626 m (5' 4\")   Wt 97.1 kg (214 lb)   BMI 36.73 kg/m      PHYSICAL EXAM:  Eyes:     no ptosis, no discharge, no scleral icterus  Mouth, Throat:     mucous membranes moist, pharynx normal without lesions  Musculoskeletal:     no elbow wrist knee or ankle deformity or swelling. Full ROM of right index finger.  Skin:     Exposed skin is dry without rash or ulcer except for R index finger with ~5x5mm area of 1st degree burn, no other area of burn. The skin is fully intact.  Rectal: Normal tone. No fissure, hemorrhoid, or lesion besides a very small skin tag at 12 o'clock.  Neurologic:     Higher Mental Function: Conversant, AOx4   Motor: Ambulatory   Sensory: crude touch intact in all 4 limbs, full sensation intact on R index finger  Psychiatric:  "    appropriate

## 2024-04-11 NOTE — NURSING NOTE
"Chief Complaint   Patient presents with    Follow Up     B20     BP (!) 151/87   Pulse 110   Temp 98.2  F (36.8  C) (Oral)   Ht 1.626 m (5' 4\")   Wt 97.1 kg (214 lb)   BMI 36.73 kg/m    Lori Figueroa MA on 4/11/2024 at 8:16 AM    "

## 2024-04-12 ENCOUNTER — PATIENT OUTREACH (OUTPATIENT)
Dept: CARE COORDINATION | Facility: CLINIC | Age: 24
End: 2024-04-12
Payer: COMMERCIAL

## 2024-04-17 NOTE — PROGRESS NOTES
Social Work - Intervention  [facility]  Data/Intervention:    Patient Name:  Aidan Henning  /Age:  2000 (23 year old)     Visit Type: Consultation  Referral Source: Dr. Wade PCC  Reason for Referral: community support     Psychosocial Information/Concerns:   received a Social Work referral order from Dr. Baugh for housing insecurity, financial, food ([facility]).     Intervention/Education/Resources Provided:  Per chart review, these concerns were/are being addressed by ANNALISA Gregory. Due to duplicative nature of services,  will close this referral.     Assessment/Plan:   will not follow up on this referral, but will remain available in the future as needed.     SY Gupta  Clinical , Outpatient Specialty Clinics  [facility] and Surgery [facility]  Direct Phone: [phone]

## 2024-05-06 DIAGNOSIS — Z21 ASYMPTOMATIC HUMAN IMMUNODEFICIENCY VIRUS (HIV) INFECTION STATUS (H): ICD-10-CM

## 2024-05-06 RX ORDER — BICTEGRAVIR SODIUM, EMTRICITABINE, AND TENOFOVIR ALAFENAMIDE FUMARATE 50; 200; 25 MG/1; MG/1; MG/1
TABLET ORAL
Qty: 90 TABLET | Refills: 0 | Status: SHIPPED | OUTPATIENT
Start: 2024-05-06 | End: 2024-07-11

## 2024-05-06 NOTE — TELEPHONE ENCOUNTER
Per [facility] Ambulatory Care Protocol, Pt is due for refill based on last refill date.   Pt has seen provider within the past year, or has appt scheduled with provider.     Med refill script E-sent to pt's pharmacy.    Signed Prescriptions:                        Disp   Refills    BIKTARVY -25 MG per tablet           90 tab*0        Sig: TAKE ONE TABLET BY MOUTH ONCE DAILY LABS AND           APPOINTMENT NEEDED FOR ANY FURTHER REFILLS.           PLEASE CALL [phone].  Authorizing Provider: YANCY COOL  Ordering User: ALY JOSE RN  Infectious Disease 05/06/24 3:47 PM

## 2024-05-30 ENCOUNTER — TELEPHONE (OUTPATIENT)
Dept: INFECTIOUS DISEASES | Facility: CLINIC | Age: 24
End: 2024-05-30

## 2024-05-30 NOTE — TELEPHONE ENCOUNTER
[facility] Call Center    Phone Message    May a detailed message be left on voicemail: yes     Reason for Call: Other: .     Patient is wanting to get a call back. Patient states he is needing a professional statement from the provider. Patient is wanting to speak with the nurses to provide more details. Please advise.     Action Taken: Message routed to:  [facility] ID    Travel Screening: Not Applicable     Date of Service:

## 2024-05-31 NOTE — TELEPHONE ENCOUNTER
Patient is scheudled with Dr. Monson on 7/11. Attempted to call no answer, rang with no VM. Will attempt again later.       Tarun Hurd RN  Infectious Disease on 5/31/2024 at 8:20 AM

## 2024-05-31 NOTE — TELEPHONE ENCOUNTER
Attempted 4 x to contact patient today no answer and no ability to leave a message. Next time who ever gets the call please try to get more information and a phone number that we can leave a message at or get a hold of the patient.       Tarun Hurd RN  Infectious Disease on 5/31/2024 at 4:13 PM

## 2024-07-10 NOTE — PROGRESS NOTES
[facility]IN[facility] [address] [address]  Phone: [phone]  Fax: [phone]    Patient:  Aidan Henning, Date of birth 2000  Date of Visit:  07/11/2024  Referring Provider Maryjane Monson MD  Reason for visit: HIV Follow-Up          Assessment and Plan     # HIV  - ART: Continue Biktarvy, he is very well controlled  - Interested in Cabenuva. Has come to last few appointments consistently. Now working on communication between appointments and then can consider   - Due for repeat HCV screening with next lab draw (just had labs 11/2023 so can defer today)  - Follow-up in 3 months with me    #Pascale    # Preventative Medicine  Sexually Transmitted Infection Risk and Screening:  Offered - not been active                       Gonorrhea and Chlamydia: - as above                       Syphilis: Serofast in the 1:2 to 1:4 range. Typically trending every 6 months or so, along with other STD screening.    Cancer screening: Performed Anal pap 12/23. Normal pap, but high risk HPV present. Referred to [facility] but hasn't seen them yet.     Bone Health: Due at age 48    Immunizations:  COVID-19: up to date  Influenza: up to date  mPox - Received 1 dose  HPV: 3/3 doses completed 7/24  Hepatitis A: Check serology with next labs  Hepatitis B:  Serology negative 2021. Needs repeat series.  Tdap: Received 2022  PCV20 - given 12/23  Meningococcus: Due    Cardiovascular Hayden:                        Lipids: Deferred today given age              Blood Pressure: at goal    Renal Health: Monitor   Creatinine   Date Value Ref Range Status   11/17/2023 1.09 0.67 - 1.17 mg/dL Final   06/10/2021 0.90 0.66 - 1.25 mg/dL Final      Plan for today:   Continue Biktarvy  Plan to continue co-managing with primary care  Met with Iman Gregory from  to discuss housing help.   Plan for repeat labs with next visit: CMP, CBC with diff, HIV viral load, HCV screen, HAV ab to assess  need for immunization. Will do CD4 count annually since well controlled with high CD4 count.   No STI screening today - no new exposures and no new symptoms. Last checked 4/24.  Needs HBV repeat series (non-responder) and meningococcal vaccines  I placed another colorectal surgery referral.   We aren't able to use email routinely for communications. Please keep us updated with your current phone number! Once we're able to figure out a consistent way to communicate well between appointments we can consider Cabenuva. Let's continue Biktarvy until then.   Return to clinic in 3 months.     Maryjane Monson MD  Infectious Diseases           HPI/Subjective     Aidan Henning is a 22 yo man who presents today for HIV follow-up. He is doing well physically today. Housing is unstable again but he's working on it. Was previously working two jobs but that was too much so he cut back to one - [employer] at the [employer] which is busy but good. Doing well with Biktarvy but difficult to always take a pill when moving around. Despite this, rarely misses doses.           HIV Labs and History:     HIV  Diagnosed 6/10/2021- Viral load 6k CD4 600s                 Started on Biktarvy  08/2021  After 4 months of treatment- Viral load 0 CD4 808 (12/22/21)  Suboptimal adherence summer 2022- Viral load 741, CD4 699 10/31/22  ----------  Labs  Toxo IgG-ve, [id number] -ve  Quantiferon negative 3/2023.  CMV+     Prior OI  None     Co-morbid Infections  Hep B: low titre as as of 6/10/21  Hep C: neg 2021     Other STD  Syphilis: Previously treated in 2021 - 3x IM inj PCN G (6/2021, 8/5 and 11/2021)  RPR trend: 6/2021 1:32, then treated. After tx: 12/2021: 1:8. Has since bounced between 1:2 and 1:4 since 10/2022. 11/17/2023 1:2.     Gonococcal and Chlamydia (throat, rectal 10/31/2022) tx in 12/2022 (CTX and doxy tx). Repeat gonorrhea throat swab neg.    LABS  Absolute CD4   Date Value Ref Range Status   06/10/2021 606 441 - 2,156 cells/uL Final      Absolute CD4, [place] T Cells   Date Value Ref Range Status   11/17/2023 1,338 441 - 2,156 cells/uL Final     HIV-1 RNA Quant Result   Date Value Ref Range Status   06/10/2021 6,123 (A) HIVND^HIV-1 RNA Not Detected [Copies]/mL Final     Comment:     The RYAN AmpliPrep/RYAN TaqMan HIV-1 test is an FDA-approved in vitro   nucleic acid amplification test for the quantitation of HIV-1 RNA in human   plasma (EDTA plasma) using the RYAN AmpliPrep instrument for automated viral   nucleic acid extraction and the RYAN TaqMan Analyzer or [employer] TaqMan for   automated Real Time PCR amplification and detection of the viral nucleic acid   target.  Titer results are reported in copies/ml. This assay is intended for use in   conjunction with clinical presentation and other laboratory markers of disease   prognosis and for use as an aid in assessing viral response to antiretroviral   treatment as measured by changes in plasma HIV-1 RNA levels. This test should   not be used as a donor screening test to confirm the presence of HIV-1   infection.       HIV-1 RNA copies/mL   Date Value Ref Range Status   04/09/2024 Not Detected Not Detected Copies/mL Final   03/06/2023 <20 (A) Not Detected Copies/mL Final     Treponema Antibodies   Date Value Ref Range Status   06/10/2021 Reactive (A) NR^Nonreactive Final     Comment:     The Anti-Treponema Antibody screening tends to remain positive for life,   therefore it does not distinguish between active and past syphilis infections.   All positive and equivocal results will automatically reflex to a   non-specific Rapid Plasma Reagin (RPR) test.  If the Treponema Antibody is positive, and the RPR is positive, this is   presumptive evidence of current infection, inadequately treated infection,   persistent infection or reinfection.  If the Anti-Treponema Antibody is positive and the RPR is negative, then a   second Treponema specific test (TP-PA) will be performed to determine whether  "  the antibody test is falsely positive or is detecting early infection.  If the   latter is suspected, repeat testing in approximately two weeks is   recommended.  Methodology Change: Test performed on the [employer] Liaison XL by Treponema   pallidum Total Antibodies Assay as of 3.17.2020.       Treponema Antibody Total   Date Value Ref Range Status   04/09/2024 Reactive (A) Nonreactive Final     Comment:     The Anti-Treponema Antibody screening tends to remain positive for life, therefore it does not distinguish between active and past syphilis infections. All positive and equivocal results will automatically reflex to a non-specific Rapid Plasma Reagin (RPR) test.    If the Treponema Antibody is positive, and the RPR is positive, this is presumptive evidence of current infection, inadequately treated infection, persistent infection or reinfection.    If the Anti-Treponema Antibody is positive and the RPR is negative, then a second Treponema specific test (TP-PA) will be performed to determine whether the antibody test is falsely positive or is detecting early infection.  If the latter is suspected, repeat testing in approximately two weeks is recommended.     Hep B Surface Agn   Date Value Ref Range Status   06/10/2021 Nonreactive NR^Nonreactive Final      Allergies:  Patient has no known allergies.         Physical Exam:   /73   Pulse 57   Temp 98  F (36.7  C) (Oral)   Ht 1.626 m (5' 4\")   Wt 97.1 kg (214 lb)   BMI 36.73 kg/m     Gen: Alert and in no distress.   Psych: Normal affect. Alert and oriented.   HEENT: PERRL. No icterus. Oropharynx pink and moist without lesions.   Neck: Supple  Chest: Normal respiratory effort.   Extremities: Warm and well perfused.   Skin: No rashes or lesions noted.      "

## 2024-07-11 ENCOUNTER — OFFICE VISIT (OUTPATIENT)
Dept: INFECTIOUS DISEASES | Facility: CLINIC | Age: 24
End: 2024-07-11
Attending: INTERNAL MEDICINE
Payer: COMMERCIAL

## 2024-07-11 ENCOUNTER — PATIENT OUTREACH (OUTPATIENT)
Dept: INFECTIOUS DISEASES | Facility: CLINIC | Age: 24
End: 2024-07-11
Payer: COMMERCIAL

## 2024-07-11 VITALS
HEART RATE: 57 BPM | SYSTOLIC BLOOD PRESSURE: 112 MMHG | HEIGHT: 64 IN | DIASTOLIC BLOOD PRESSURE: 73 MMHG | WEIGHT: 214 LBS | BODY MASS INDEX: 36.54 KG/M2 | TEMPERATURE: 98 F

## 2024-07-11 DIAGNOSIS — Z21 ASYMPTOMATIC HUMAN IMMUNODEFICIENCY VIRUS (HIV) INFECTION STATUS (H): ICD-10-CM

## 2024-07-11 DIAGNOSIS — Z23 NEED FOR VACCINATION: Primary | ICD-10-CM

## 2024-07-11 DIAGNOSIS — A63.0 HPV (HUMAN PAPILLOMA VIRUS) ANOGENITAL INFECTION: ICD-10-CM

## 2024-07-11 PROCEDURE — 99214 OFFICE O/P EST MOD 30 MIN: CPT | Performed by: INTERNAL MEDICINE

## 2024-07-11 PROCEDURE — G0463 HOSPITAL OUTPT CLINIC VISIT: HCPCS | Mod: 25 | Performed by: INTERNAL MEDICINE

## 2024-07-11 PROCEDURE — G2211 COMPLEX E/M VISIT ADD ON: HCPCS | Performed by: INTERNAL MEDICINE

## 2024-07-11 PROCEDURE — 90471 IMMUNIZATION ADMIN: CPT | Performed by: INTERNAL MEDICINE

## 2024-07-11 PROCEDURE — 90651 9VHPV VACCINE 2/3 DOSE IM: CPT | Performed by: INTERNAL MEDICINE

## 2024-07-11 PROCEDURE — 250N000021 HC RX MED A9270 GY (STAT IND- M) 250: Performed by: INTERNAL MEDICINE

## 2024-07-11 RX ORDER — BICTEGRAVIR SODIUM, EMTRICITABINE, AND TENOFOVIR ALAFENAMIDE FUMARATE 50; 200; 25 MG/1; MG/1; MG/1
1 TABLET ORAL DAILY
Qty: 90 TABLET | Refills: 1 | Status: SHIPPED | OUTPATIENT
Start: 2024-07-11

## 2024-07-11 RX ADMIN — HUMAN PAPILLOMAVIRUS 9-VALENT VACCINE, RECOMBINANT 0.5 ML: 30; 40; 60; 40; 20; 20; 20; 20; 20 INJECTION, SUSPENSION INTRAMUSCULAR at 08:51

## 2024-07-11 ASSESSMENT — PAIN SCALES - GENERAL: PAINLEVEL: NO PAIN (0)

## 2024-07-11 ASSESSMENT — PATIENT HEALTH QUESTIONNAIRE - PHQ9: SUM OF ALL RESPONSES TO PHQ QUESTIONS 1-9: 7

## 2024-07-11 NOTE — LETTER
7/11/2024       RE: Aidan Henning  [address]   [address]  [place] [address]     Dear Colleague,    Thank you for referring your patient, Aidan Henning, to the [facility] DISEASE [facility] [place] at [facility]. Please see a copy of my visit note below.      [facility] [place]  [address] [address]  Phone: [phone]  Fax: [phone]    Patient:  Aidan Henning, Date of birth 2000  Date of Visit:  07/11/2024  Referring Provider Maryjane Monson MD  Reason for visit: HIV Follow-Up          Assessment and Plan     # HIV  - ART: Continue Biktarvy, he is very well controlled  - Interested in Cabenuva. Has come to last few appointments consistently. Now working on communication between appointments and then can consider   - Due for repeat HCV screening with next lab draw (just had labs 11/2023 so can defer today)  - Follow-up in 3 months with me    #Pascale    # Preventative Medicine  Sexually Transmitted Infection Risk and Screening:  Offered - not been active                       Gonorrhea and Chlamydia: - as above                       Syphilis: Serofast in the 1:2 to 1:4 range. Typically trending every 6 months or so, along with other STD screening.    Cancer screening: Performed Anal pap 12/23. Normal pap, but high risk HPV present. Referred to [facility] but hasn't seen them yet.     Bone Health: Due at age 48    Immunizations:  COVID-19: up to date  Influenza: up to date  mPox - Received 1 dose  HPV: 3/3 doses completed 7/24  Hepatitis A: Check serology with next labs  Hepatitis B:  Serology negative 2021. Needs repeat series.  Tdap: Received 2022  PCV20 - given 12/23  Meningococcus: Due    Cardiovascular Hayden:                        Lipids: Deferred today given age              Blood Pressure: at goal    Renal Health: Monitor   Creatinine   Date Value Ref Range Status    11/17/2023 1.09 0.67 - 1.17 mg/dL Final   06/10/2021 0.90 0.66 - 1.25 mg/dL Final      Plan for today:   Continue Biktarvy  Plan to continue co-managing with primary care  Met with Iman Gregory from  to discuss housing help.   Plan for repeat labs with next visit: CMP, CBC with diff, HIV viral load, HCV screen, HAV ab to assess need for immunization. Will do CD4 count annually since well controlled with high CD4 count.   No STI screening today - no new exposures and no new symptoms. Last checked 4/24.  Needs HBV repeat series (non-responder) and meningococcal vaccines  I placed another colorectal surgery referral.   We aren't able to use email routinely for communications. Please keep us updated with your current phone number! Once we're able to figure out a consistent way to communicate well between appointments we can consider Cabenuva. Let's continue Biktarvy until then.   Return to clinic in 3 months.     Maryjane Monson MD  Infectious Diseases           HPI/Subjective     Aidan Henning is a 24 yo man who presents today for HIV follow-up. He is doing well physically today. Housing is unstable again but he's working on it. Was previously working two jobs but that was too much so he cut back to one - [employer] at the [employer] which is busy but good. Doing well with Biktarvy but difficult to always take a pill when moving around. Despite this, rarely misses doses.           HIV Labs and History:     HIV  Diagnosed 6/10/2021- Viral load 6k CD4 600s                 Started on Biktarvy  08/2021  After 4 months of treatment- Viral load 0 CD4 808 (12/22/21)  Suboptimal adherence summer 2022- Viral load 741, CD4 699 10/31/22  ----------  Labs  Toxo IgG-ve, [id number] -ve  Quantiferon negative 3/2023.  CMV+     Prior OI  None     Co-morbid Infections  Hep B: low titre as as of 6/10/21  Hep C: neg 2021     Other STD  Syphilis: Previously treated in 2021 - 3x IM inj PCN G (6/2021, 8/5 and 11/2021)  RPR trend: 6/2021  1:32, then treated. After tx: 12/2021: 1:8. Has since bounced between 1:2 and 1:4 since 10/2022. 11/17/2023 1:2.     Gonococcal and Chlamydia (throat, rectal 10/31/2022) tx in 12/2022 (CTX and doxy tx). Repeat gonorrhea throat swab neg.    LABS  Absolute CD4   Date Value Ref Range Status   06/10/2021 606 441 - 2,156 cells/uL Final     Absolute CD4, [place] T Cells   Date Value Ref Range Status   11/17/2023 1,338 441 - 2,156 cells/uL Final     HIV-1 RNA Quant Result   Date Value Ref Range Status   06/10/2021 6,123 (A) HIVND^HIV-1 RNA Not Detected [Copies]/mL Final     Comment:     The RYAN AmpliPrep/RYAN TaqMan HIV-1 test is an FDA-approved in vitro   nucleic acid amplification test for the quantitation of HIV-1 RNA in human   plasma (EDTA plasma) using the RYAN AmpliPrep instrument for automated viral   nucleic acid extraction and the [employer] TaqMan Analyzer or [employer] TaqMan for   automated Real Time PCR amplification and detection of the viral nucleic acid   target.  Titer results are reported in copies/ml. This assay is intended for use in   conjunction with clinical presentation and other laboratory markers of disease   prognosis and for use as an aid in assessing viral response to antiretroviral   treatment as measured by changes in plasma HIV-1 RNA levels. This test should   not be used as a donor screening test to confirm the presence of HIV-1   infection.       HIV-1 RNA copies/mL   Date Value Ref Range Status   04/09/2024 Not Detected Not Detected Copies/mL Final   03/06/2023 <20 (A) Not Detected Copies/mL Final     Treponema Antibodies   Date Value Ref Range Status   06/10/2021 Reactive (A) NR^Nonreactive Final     Comment:     The Anti-Treponema Antibody screening tends to remain positive for life,   therefore it does not distinguish between active and past syphilis infections.   All positive and equivocal results will automatically reflex to a   non-specific Rapid Plasma Reagin (RPR) test.  If the  "Treponema Antibody is positive, and the RPR is positive, this is   presumptive evidence of current infection, inadequately treated infection,   persistent infection or reinfection.  If the Anti-Treponema Antibody is positive and the RPR is negative, then a   second Treponema specific test (TP-PA) will be performed to determine whether   the antibody test is falsely positive or is detecting early infection.  If the   latter is suspected, repeat testing in approximately two weeks is   recommended.  Methodology Change: Test performed on the [employer] Liaison XL by Treponema   pallidum Total Antibodies Assay as of 3.17.2020.       Treponema Antibody Total   Date Value Ref Range Status   04/09/2024 Reactive (A) Nonreactive Final     Comment:     The Anti-Treponema Antibody screening tends to remain positive for life, therefore it does not distinguish between active and past syphilis infections. All positive and equivocal results will automatically reflex to a non-specific Rapid Plasma Reagin (RPR) test.    If the Treponema Antibody is positive, and the RPR is positive, this is presumptive evidence of current infection, inadequately treated infection, persistent infection or reinfection.    If the Anti-Treponema Antibody is positive and the RPR is negative, then a second Treponema specific test (TP-PA) will be performed to determine whether the antibody test is falsely positive or is detecting early infection.  If the latter is suspected, repeat testing in approximately two weeks is recommended.     Hep B Surface Agn   Date Value Ref Range Status   06/10/2021 Nonreactive NR^Nonreactive Final      Allergies:  Patient has no known allergies.         Physical Exam:   /73   Pulse 57   Temp 98  F (36.7  C) (Oral)   Ht 1.626 m (5' 4\")   Wt 97.1 kg (214 lb)   BMI 36.73 kg/m     Gen: Alert and in no distress.   Psych: Normal affect. Alert and oriented.   HEENT: PERRL. No icterus. Oropharynx pink and moist without " lesions.   Neck: Supple  Chest: Normal respiratory effort.   Extremities: Warm and well perfused.   Skin: No rashes or lesions noted.        Maryjane Monson MD

## 2024-07-11 NOTE — NURSING NOTE
"Chief Complaint   Patient presents with    Follow Up     3 month       /73   Pulse 57   Temp 98  F (36.7  C) (Oral)   Ht 1.626 m (5' 4\")   Wt 97.1 kg (214 lb)   BMI 36.73 kg/m    Lori Figueroa MA on 7/11/2024 at 7:55 AM    "

## 2024-07-11 NOTE — PATIENT INSTRUCTIONS
Keep up the good work taking Biktarvy!  I placed another colorectal surgery referral. When you get a message from them, please call to schedule!  We aren't able to use email routinely for communications (I checked). Please keep us updated with your current phone number! Once we're able to figure out a way to communicate well between appointments we can consider Cabenuva. Let's continue Biktarvy until then.   I'll see you in 3 months.

## 2024-07-11 NOTE — TELEPHONE ENCOUNTER
Social Work - Intervention  [facility]  Data/Intervention:     Patient Name: Aidan Henning Goes By: Aidan SOSAB/Age: 2000 (23 year old)     Visit Type: In Person  Referral Source: Provider   Reason for Referral: Housing Resources     Collaborated With:    -Patient      Psychosocial Information/Concerns:  Patient reports he is currently staying with a family member and that it is not long term solution. Patient reports seeking housing resources.      Intervention/Education/Resources Provided:  Writer and Patient reviewed available housing resources including Housing Link, Coordinated Entry, and Housing Stabilization Services. Patient is currently working with the [facility] on a form that shows his need for housing. Writer and Patient also spoke about [facility] Housing Advocacy Program.      Assessment/Plan:  Patient will follow up on resources provided. Writer provided patient with contact information and availability if further assistance is needed.     MARIAN Honeycutt, [facility]  Infectious Disease

## 2024-10-07 ENCOUNTER — PATIENT OUTREACH (OUTPATIENT)
Dept: INFECTIOUS DISEASES | Facility: CLINIC | Age: 24
End: 2024-10-07
Payer: COMMERCIAL

## 2024-10-07 NOTE — TELEPHONE ENCOUNTER
Social Work - Telephone  [facility]  Data: 10/7/2024  Patient Name: Aidan Henning  Goes By: Aidan    KATERINE/Age: 2000 (24 year old)       Reason for Referral: Appointment Reminder     Intervention: Writer called Patient to provide appointment reminder and assess for barriers to care. Patient's phone is out of service at this time.   Plan: Patient has an appointment 10/10.

## 2024-12-17 ENCOUNTER — OFFICE VISIT (OUTPATIENT)
Dept: INTERNAL MEDICINE | Facility: CLINIC | Age: 24
End: 2024-12-17
Payer: COMMERCIAL

## 2024-12-17 VITALS
TEMPERATURE: 97.9 F | HEIGHT: 64 IN | WEIGHT: 219 LBS | OXYGEN SATURATION: 97 % | RESPIRATION RATE: 18 BRPM | SYSTOLIC BLOOD PRESSURE: 130 MMHG | DIASTOLIC BLOOD PRESSURE: 68 MMHG | HEART RATE: 89 BPM | BODY MASS INDEX: 37.39 KG/M2

## 2024-12-17 DIAGNOSIS — Z72.0 TOBACCO USE: ICD-10-CM

## 2024-12-17 DIAGNOSIS — R03.0 ELEVATED BLOOD PRESSURE READING WITHOUT DIAGNOSIS OF HYPERTENSION: Primary | ICD-10-CM

## 2024-12-17 DIAGNOSIS — G89.29 CHRONIC MIDLINE LOW BACK PAIN, UNSPECIFIED WHETHER SCIATICA PRESENT: ICD-10-CM

## 2024-12-17 DIAGNOSIS — M54.50 CHRONIC MIDLINE LOW BACK PAIN, UNSPECIFIED WHETHER SCIATICA PRESENT: ICD-10-CM

## 2024-12-17 DIAGNOSIS — R53.83 FATIGUE, UNSPECIFIED TYPE: ICD-10-CM

## 2024-12-17 ASSESSMENT — ANXIETY QUESTIONNAIRES
IF YOU CHECKED OFF ANY PROBLEMS ON THIS QUESTIONNAIRE, HOW DIFFICULT HAVE THESE PROBLEMS MADE IT FOR YOU TO DO YOUR WORK, TAKE CARE OF THINGS AT HOME, OR GET ALONG WITH OTHER PEOPLE: VERY DIFFICULT
2. NOT BEING ABLE TO STOP OR CONTROL WORRYING: SEVERAL DAYS
5. BEING SO RESTLESS THAT IT IS HARD TO SIT STILL: SEVERAL DAYS
6. BECOMING EASILY ANNOYED OR IRRITABLE: SEVERAL DAYS
7. FEELING AFRAID AS IF SOMETHING AWFUL MIGHT HAPPEN: NOT AT ALL
3. WORRYING TOO MUCH ABOUT DIFFERENT THINGS: MORE THAN HALF THE DAYS
1. FEELING NERVOUS, ANXIOUS, OR ON EDGE: MORE THAN HALF THE DAYS
GAD7 TOTAL SCORE: 9
GAD7 TOTAL SCORE: 9

## 2024-12-17 ASSESSMENT — PATIENT HEALTH QUESTIONNAIRE - PHQ9
SUM OF ALL RESPONSES TO PHQ QUESTIONS 1-9: 15
10. IF YOU CHECKED OFF ANY PROBLEMS, HOW DIFFICULT HAVE THESE PROBLEMS MADE IT FOR YOU TO DO YOUR WORK, TAKE CARE OF THINGS AT HOME, OR GET ALONG WITH OTHER PEOPLE: EXTREMELY DIFFICULT
5. POOR APPETITE OR OVEREATING: MORE THAN HALF THE DAYS
SUM OF ALL RESPONSES TO PHQ QUESTIONS 1-9: 15

## 2024-12-17 NOTE — PROGRESS NOTES
"  Assessment & Plan     Elevated blood pressure reading without diagnosis of hypertension  BP initially elevated today, decreased to 130/68 on manual repeat. Intermittent elevations in the past. Recommend continuing monitoring in clinic, attention to healthy diet and exercise.     Chronic midline low back pain, unspecified whether sciatica present  Fatigue, unspecified type  Back pain and fatigue for the past two months. He has not been working due to leg pain. Discussed imaging, PT, and labs, though he declines today. He prefers to follow-up in January. Will plan to follow-up with Priscilla Olvera as scheduled in January for reevaluation.     Tobacco use  Not interested in quitting at this time.         Nicotine/Tobacco Cessation  He reports that he has been smoking cigarettes. He has never used smokeless tobacco.  Nicotine/Tobacco Cessation Plan  Information offered: Patient not interested at this time      No follow-ups on file.    Lev Bermudez is a 24 year old, presenting for the following health issues:  Forms (Housing form. Request for medical opinion.)        12/17/2024    11:33 AM   Additional Questions   Roomed by KTR         12/17/2024   Forms   Any forms needing to be completed Yes        History of Present Illness       Reason for visit:  Forms   He is taking medications regularly.     Aidan Henning presents to the clinic today for follow-up and paperwork completion. He has a history of HIV and HPV.     Notes it is too difficult for him to work due to pain. Feels like he cannot stand very long due to back and leg pain. Feels overly tired. Has been ongoing for about two months. He has not been evaluated for this in the past. Previously worked at [employer] as a , fell, went to PT once but notes this was not covered by his insurance so he could not go back due to cost barrier. Feels like his legs will get numb sometimes, worse when first waking up, will \"shake out\" his legs which can " "help. No bowel or bladder changes. He does not want another referral for PT at this time. Declines imaging. He declines labs today.        Review of Systems  Constitutional, HEENT, cardiovascular, pulmonary, gi and gu systems are negative, except as otherwise noted.      Objective    /68 (BP Location: Right arm, Patient Position: Sitting, Cuff Size: Adult Regular)   Pulse 89   Temp 97.9  F (36.6  C) (Oral)   Resp 18   Ht 1.626 m (5' 4.02\")   Wt 99.3 kg (219 lb)   SpO2 97%   BMI 37.57 kg/m    Body mass index is 37.57 kg/m .  Physical Exam   GENERAL: alert and no distress  RESP: lungs clear to auscultation - no rales, rhonchi or wheezes  CV: regular rate and rhythm, normal S1 S2, no S3 or S4, no murmur, click or rub, no peripheral edema  MS: no gross musculoskeletal defects noted, no spinal point tenderness, ROM intact, gait intact, able to complete heel and toe walking, BLE strength equal.   PSYCH: mentation appears normal, affect normal/bright            Signed Electronically by: Jane Hwang NP    "

## 2025-01-04 ENCOUNTER — HEALTH MAINTENANCE LETTER (OUTPATIENT)
Age: 25
End: 2025-01-04

## 2025-01-07 ENCOUNTER — OFFICE VISIT (OUTPATIENT)
Dept: INTERNAL MEDICINE | Facility: CLINIC | Age: 25
End: 2025-01-07
Payer: COMMERCIAL

## 2025-01-07 VITALS
HEIGHT: 64 IN | WEIGHT: 219.9 LBS | SYSTOLIC BLOOD PRESSURE: 120 MMHG | BODY MASS INDEX: 37.54 KG/M2 | OXYGEN SATURATION: 94 % | DIASTOLIC BLOOD PRESSURE: 75 MMHG | RESPIRATION RATE: 16 BRPM | TEMPERATURE: 97.6 F | HEART RATE: 89 BPM

## 2025-01-07 DIAGNOSIS — Z21 ASYMPTOMATIC HUMAN IMMUNODEFICIENCY VIRUS (HIV) INFECTION STATUS (H): Primary | ICD-10-CM

## 2025-01-07 DIAGNOSIS — Z13.9 ENCOUNTER FOR SCREENING INVOLVING SOCIAL DETERMINANTS OF HEALTH (SDOH): ICD-10-CM

## 2025-01-07 DIAGNOSIS — F41.9 ANXIETY: ICD-10-CM

## 2025-01-07 PROBLEM — F29 PSYCHOSIS (H): Status: ACTIVE | Noted: 2023-06-26

## 2025-01-07 PROCEDURE — 99214 OFFICE O/P EST MOD 30 MIN: CPT | Performed by: NURSE PRACTITIONER

## 2025-01-07 RX ORDER — BICTEGRAVIR SODIUM, EMTRICITABINE, AND TENOFOVIR ALAFENAMIDE FUMARATE 50; 200; 25 MG/1; MG/1; MG/1
1 TABLET ORAL DAILY
Qty: 90 TABLET | Refills: 3 | Status: SHIPPED | OUTPATIENT
Start: 2025-01-07

## 2025-01-07 RX ORDER — BICTEGRAVIR SODIUM, EMTRICITABINE, AND TENOFOVIR ALAFENAMIDE FUMARATE 50; 200; 25 MG/1; MG/1; MG/1
1 TABLET ORAL DAILY
Qty: 90 TABLET | Refills: 1 | Status: SHIPPED | OUTPATIENT
Start: 2025-01-07 | End: 2025-01-07

## 2025-01-07 NOTE — PROGRESS NOTES
Assessment and Plan   Mr. Henning is a 24 year old male with history of HIV illness seen for medication refill and forms.    (Z21) Asymptomatic human immunodeficiency virus (HIV) infection status (H)  (primary encounter diagnosis)  Comment: Doing well, without symptoms of opportunistic infection  Plan: HIV-1 RNA quantitative,   Renewed bictegravir-emtricitabine-tenofovir (BIKTARVY)  -25 MG per tablet, 1 year supply    (F41.9) Anxiety  Comment:  History of anxiety and psychosis. Doing well now, no concerns  Plan: Periodic need for counseling    (Z13.9) Encounter for screening involving social determinants of health (SDoH)  Plan: Housing assistance form completed    Return to clinic/Follow-up:  As needed and with no improvement, worsening, or new symptom development.     Options for treatment and follow-up care were reviewed with the patient. He engaged in the decision making process and verbalized understanding of the options discussed and agreed with the final plan.    I spent a total of 20 minutes in the care of this pt today, including time prior to, during, and following today's office visit. This time includes reviewing the patient's chart and prior history, external notes, obtaining a history, performing an examination, interpreting test results, and evaluation and counseling the patient. This time also includes ordering medications or tests necessary in addition to communication to other member's of the patient's health care team. Time spent in documentation and care coordination is included.    Priscilla Olvera, ANP, AGACNP, APRN, CNP  Nurse Practitioner    __________________________    Subjective   Presenting Concern:    Mr. Henning is a 24 year old male with history of HIV, elevated BP, fatigue, LBP, tobacco use who presents for paperwork and refill of Biktarvy    HIV on Biktarvy:  Follows with ID    HTN:  BP elevated 12/2/24 and intermittently in the past  Normotensive today    Forms:  Requests assistance  "with housing form    Review of external notes as documented above     Past Medical History:  No past medical history on file.    Active Meds:  Current Outpatient Medications   Medication Sig Dispense Refill    bictegravir-emtricitabine-tenofovir (BIKTARVY) -25 MG per tablet Take 1 tablet by mouth daily 90 tablet 1    triamcinolone (KENALOG) 0.1 % external cream Apply topically 2 times daily as needed for irritation (Patient not taking: Reported on 12/17/2024) 30 g 0     No current facility-administered medications for this visit.        Allergies:  Reviewed, refer to EMR    A full 10-pt Review of Systems was performed, verified and is negative except as documented in the HPI.  All health questionnaires were reviewed, verified and relevant information documented above.      Objective    /75 (BP Location: Right arm, Patient Position: Sitting, Cuff Size: Adult Large)   Pulse 89   Temp 97.6  F (36.4  C) (Oral)   Resp 16   Ht 1.626 m (5' 4.02\")   Wt 99.7 kg (219 lb 14.4 oz)   SpO2 94%   BMI 37.73 kg/m      Physical Exam   General appearance: alert, well appearing, and in no distress  Eyes: extra-ocular movements intact, pupils equally round and reactive bilaterally, no scleral icterus  Neck: no significant adenopathy, lymphadenopathy  Respiratory: Good air movement bilaterally, lungs clear  Cardiovascular: normal rate and regular rhythm, S1 and S2 normal, no murmurs, rubs or gallops  Neurological: cranial nerves II through XII grossly intact, no tremors, strength 5/5 throughout, sensation to light touch intact  Extremities: no peripheral edema  Skin: normal coloration and turgor.   Psychiatric: normal mentation, affect and mood        "

## 2025-01-07 NOTE — PROGRESS NOTES
"Lev Bermudez is a 24 year old, presenting for the following health issues:  Forms (Medical opinion form)        1/7/2025     5:42 PM   Additional Questions   Roomed by dc emt         1/7/2025   Forms   Any forms needing to be completed Yes       Objective    /75 (BP Location: Right arm, Patient Position: Sitting, Cuff Size: Adult Large)   Pulse 89   Temp 97.6  F (36.4  C) (Oral)   Resp 16   Ht 1.626 m (5' 4.02\")   Wt 99.7 kg (219 lb 14.4 oz)   SpO2 94%   BMI 37.73 kg/m    Body mass index is 37.73 kg/m .    Signed Electronically by: Priscilla Olvera NP    "

## 2025-04-14 ENCOUNTER — TELEPHONE (OUTPATIENT)
Dept: PHARMACY | Facility: CLINIC | Age: 25
End: 2025-04-14
Payer: COMMERCIAL

## 2025-04-14 NOTE — TELEPHONE ENCOUNTER
The last success refill we had with this patient is 12/09/24. We have filled two other times since but they have had to be returned to stock because he did not pick them up. I will be taking him off my list for the time being.     Yuki Bright, [facility]  [phone]